# Patient Record
Sex: MALE | Race: ASIAN | NOT HISPANIC OR LATINO | URBAN - METROPOLITAN AREA
[De-identification: names, ages, dates, MRNs, and addresses within clinical notes are randomized per-mention and may not be internally consistent; named-entity substitution may affect disease eponyms.]

---

## 2022-08-19 ENCOUNTER — INPATIENT (INPATIENT)
Facility: HOSPITAL | Age: 67
LOS: 19 days | Discharge: HOME HEALTH | DRG: 329 | End: 2022-09-08
Attending: SURGERY | Admitting: SURGERY
Payer: COMMERCIAL

## 2022-08-19 ENCOUNTER — TRANSCRIPTION ENCOUNTER (OUTPATIENT)
Age: 67
End: 2022-08-19

## 2022-08-19 VITALS
DIASTOLIC BLOOD PRESSURE: 91 MMHG | RESPIRATION RATE: 18 BRPM | WEIGHT: 130.07 LBS | OXYGEN SATURATION: 100 % | HEIGHT: 63 IN | HEART RATE: 50 BPM | SYSTOLIC BLOOD PRESSURE: 159 MMHG

## 2022-08-19 DIAGNOSIS — K63.1 PERFORATION OF INTESTINE (NONTRAUMATIC): ICD-10-CM

## 2022-08-19 LAB
ALBUMIN SERPL ELPH-MCNC: 2.9 G/DL — LOW (ref 3.3–5)
ALBUMIN SERPL ELPH-MCNC: 4 G/DL — SIGNIFICANT CHANGE UP (ref 3.3–5)
ALP SERPL-CCNC: 52 U/L — SIGNIFICANT CHANGE UP (ref 40–120)
ALP SERPL-CCNC: 77 U/L — SIGNIFICANT CHANGE UP (ref 40–120)
ALT FLD-CCNC: 22 U/L — SIGNIFICANT CHANGE UP (ref 10–45)
ALT FLD-CCNC: 23 U/L — SIGNIFICANT CHANGE UP (ref 10–45)
ANION GAP SERPL CALC-SCNC: 12 MMOL/L — SIGNIFICANT CHANGE UP (ref 5–17)
ANION GAP SERPL CALC-SCNC: 15 MMOL/L — SIGNIFICANT CHANGE UP (ref 5–17)
APTT BLD: 25.5 SEC — LOW (ref 27.5–35.5)
AST SERPL-CCNC: 28 U/L — SIGNIFICANT CHANGE UP (ref 10–40)
AST SERPL-CCNC: 29 U/L — SIGNIFICANT CHANGE UP (ref 10–40)
BASE EXCESS BLDV CALC-SCNC: 4.5 MMOL/L — HIGH (ref -2–3)
BASOPHILS # BLD AUTO: 0.01 K/UL — SIGNIFICANT CHANGE UP (ref 0–0.2)
BASOPHILS NFR BLD AUTO: 0.1 % — SIGNIFICANT CHANGE UP (ref 0–2)
BILIRUB SERPL-MCNC: 0.1 MG/DL — LOW (ref 0.2–1.2)
BILIRUB SERPL-MCNC: 0.3 MG/DL — SIGNIFICANT CHANGE UP (ref 0.2–1.2)
BLD GP AB SCN SERPL QL: NEGATIVE — SIGNIFICANT CHANGE UP
BUN SERPL-MCNC: 13 MG/DL — SIGNIFICANT CHANGE UP (ref 7–23)
BUN SERPL-MCNC: 20 MG/DL — SIGNIFICANT CHANGE UP (ref 7–23)
CA-I SERPL-SCNC: 1.17 MMOL/L — SIGNIFICANT CHANGE UP (ref 1.15–1.33)
CALCIUM SERPL-MCNC: 7.1 MG/DL — LOW (ref 8.4–10.5)
CALCIUM SERPL-MCNC: 8.8 MG/DL — SIGNIFICANT CHANGE UP (ref 8.4–10.5)
CHLORIDE BLDV-SCNC: 101 MMOL/L — SIGNIFICANT CHANGE UP (ref 96–108)
CHLORIDE SERPL-SCNC: 103 MMOL/L — SIGNIFICANT CHANGE UP (ref 96–108)
CHLORIDE SERPL-SCNC: 104 MMOL/L — SIGNIFICANT CHANGE UP (ref 96–108)
CO2 BLDV-SCNC: 32 MMOL/L — HIGH (ref 22–26)
CO2 SERPL-SCNC: 23 MMOL/L — SIGNIFICANT CHANGE UP (ref 22–31)
CO2 SERPL-SCNC: 23 MMOL/L — SIGNIFICANT CHANGE UP (ref 22–31)
CREAT SERPL-MCNC: 0.77 MG/DL — SIGNIFICANT CHANGE UP (ref 0.5–1.3)
CREAT SERPL-MCNC: 0.87 MG/DL — SIGNIFICANT CHANGE UP (ref 0.5–1.3)
EGFR: 93 ML/MIN/1.73M2 — SIGNIFICANT CHANGE UP
EGFR: 97 ML/MIN/1.73M2 — SIGNIFICANT CHANGE UP
EOSINOPHIL # BLD AUTO: 0.02 K/UL — SIGNIFICANT CHANGE UP (ref 0–0.5)
EOSINOPHIL NFR BLD AUTO: 0.3 % — SIGNIFICANT CHANGE UP (ref 0–6)
GAS PNL BLDV: 136 MMOL/L — SIGNIFICANT CHANGE UP (ref 136–145)
GAS PNL BLDV: SIGNIFICANT CHANGE UP
GLUCOSE BLDV-MCNC: 164 MG/DL — HIGH (ref 70–99)
GLUCOSE SERPL-MCNC: 132 MG/DL — HIGH (ref 70–99)
GLUCOSE SERPL-MCNC: 152 MG/DL — HIGH (ref 70–99)
HCO3 BLDV-SCNC: 31 MMOL/L — HIGH (ref 22–29)
HCT VFR BLD CALC: 44.5 % — SIGNIFICANT CHANGE UP (ref 39–50)
HCT VFR BLD CALC: 48.5 % — SIGNIFICANT CHANGE UP (ref 39–50)
HCT VFR BLDA CALC: 48 % — SIGNIFICANT CHANGE UP (ref 39–51)
HGB BLD CALC-MCNC: 15.9 G/DL — SIGNIFICANT CHANGE UP (ref 12.6–17.4)
HGB BLD-MCNC: 14.3 G/DL — SIGNIFICANT CHANGE UP (ref 13–17)
HGB BLD-MCNC: 15.5 G/DL — SIGNIFICANT CHANGE UP (ref 13–17)
IMM GRANULOCYTES NFR BLD AUTO: 0.4 % — SIGNIFICANT CHANGE UP (ref 0–1.5)
INR BLD: 1.04 RATIO — SIGNIFICANT CHANGE UP (ref 0.88–1.16)
LACTATE BLDV-MCNC: 3.9 MMOL/L — HIGH (ref 0.5–2)
LIDOCAIN IGE QN: 217 U/L — HIGH (ref 7–60)
LYMPHOCYTES # BLD AUTO: 2.03 K/UL — SIGNIFICANT CHANGE UP (ref 1–3.3)
LYMPHOCYTES # BLD AUTO: 27.6 % — SIGNIFICANT CHANGE UP (ref 13–44)
MAGNESIUM SERPL-MCNC: 1.8 MG/DL — SIGNIFICANT CHANGE UP (ref 1.6–2.6)
MCHC RBC-ENTMCNC: 29.9 PG — SIGNIFICANT CHANGE UP (ref 27–34)
MCHC RBC-ENTMCNC: 30 PG — SIGNIFICANT CHANGE UP (ref 27–34)
MCHC RBC-ENTMCNC: 32 GM/DL — SIGNIFICANT CHANGE UP (ref 32–36)
MCHC RBC-ENTMCNC: 32.1 GM/DL — SIGNIFICANT CHANGE UP (ref 32–36)
MCV RBC AUTO: 93.5 FL — SIGNIFICANT CHANGE UP (ref 80–100)
MCV RBC AUTO: 93.6 FL — SIGNIFICANT CHANGE UP (ref 80–100)
MONOCYTES # BLD AUTO: 0.3 K/UL — SIGNIFICANT CHANGE UP (ref 0–0.9)
MONOCYTES NFR BLD AUTO: 4.1 % — SIGNIFICANT CHANGE UP (ref 2–14)
NEUTROPHILS # BLD AUTO: 4.96 K/UL — SIGNIFICANT CHANGE UP (ref 1.8–7.4)
NEUTROPHILS NFR BLD AUTO: 67.5 % — SIGNIFICANT CHANGE UP (ref 43–77)
NRBC # BLD: 0 /100 WBCS — SIGNIFICANT CHANGE UP (ref 0–0)
NRBC # BLD: 0 /100 WBCS — SIGNIFICANT CHANGE UP (ref 0–0)
NT-PROBNP SERPL-SCNC: 45 PG/ML — SIGNIFICANT CHANGE UP (ref 0–300)
PCO2 BLDV: 51 MMHG — SIGNIFICANT CHANGE UP (ref 42–55)
PH BLDV: 7.39 — SIGNIFICANT CHANGE UP (ref 7.32–7.43)
PHOSPHATE SERPL-MCNC: 3.8 MG/DL — SIGNIFICANT CHANGE UP (ref 2.5–4.5)
PLATELET # BLD AUTO: 170 K/UL — SIGNIFICANT CHANGE UP (ref 150–400)
PLATELET # BLD AUTO: 197 K/UL — SIGNIFICANT CHANGE UP (ref 150–400)
PO2 BLDV: 38 MMHG — SIGNIFICANT CHANGE UP (ref 25–45)
POTASSIUM BLDV-SCNC: 4 MMOL/L — SIGNIFICANT CHANGE UP (ref 3.5–5.1)
POTASSIUM SERPL-MCNC: 3.1 MMOL/L — LOW (ref 3.5–5.3)
POTASSIUM SERPL-MCNC: 4 MMOL/L — SIGNIFICANT CHANGE UP (ref 3.5–5.3)
POTASSIUM SERPL-SCNC: 3.1 MMOL/L — LOW (ref 3.5–5.3)
POTASSIUM SERPL-SCNC: 4 MMOL/L — SIGNIFICANT CHANGE UP (ref 3.5–5.3)
PROT SERPL-MCNC: 5 G/DL — LOW (ref 6–8.3)
PROT SERPL-MCNC: 6.8 G/DL — SIGNIFICANT CHANGE UP (ref 6–8.3)
PROTHROM AB SERPL-ACNC: 12.1 SEC — SIGNIFICANT CHANGE UP (ref 10.5–13.4)
RBC # BLD: 4.76 M/UL — SIGNIFICANT CHANGE UP (ref 4.2–5.8)
RBC # BLD: 5.18 M/UL — SIGNIFICANT CHANGE UP (ref 4.2–5.8)
RBC # FLD: 12.3 % — SIGNIFICANT CHANGE UP (ref 10.3–14.5)
RBC # FLD: 12.3 % — SIGNIFICANT CHANGE UP (ref 10.3–14.5)
RH IG SCN BLD-IMP: POSITIVE — SIGNIFICANT CHANGE UP
SAO2 % BLDV: 60.5 % — LOW (ref 67–88)
SARS-COV-2 RNA SPEC QL NAA+PROBE: DETECTED
SARS-COV-2 RNA SPEC QL NAA+PROBE: DETECTED
SODIUM SERPL-SCNC: 139 MMOL/L — SIGNIFICANT CHANGE UP (ref 135–145)
SODIUM SERPL-SCNC: 141 MMOL/L — SIGNIFICANT CHANGE UP (ref 135–145)
TROPONIN T, HIGH SENSITIVITY RESULT: <6 NG/L — SIGNIFICANT CHANGE UP (ref 0–51)
WBC # BLD: 3.23 K/UL — LOW (ref 3.8–10.5)
WBC # BLD: 7.35 K/UL — SIGNIFICANT CHANGE UP (ref 3.8–10.5)
WBC # FLD AUTO: 3.23 K/UL — LOW (ref 3.8–10.5)
WBC # FLD AUTO: 7.35 K/UL — SIGNIFICANT CHANGE UP (ref 3.8–10.5)

## 2022-08-19 PROCEDURE — 74174 CTA ABD&PLVS W/CONTRAST: CPT | Mod: 26,MA

## 2022-08-19 PROCEDURE — 93010 ELECTROCARDIOGRAM REPORT: CPT

## 2022-08-19 PROCEDURE — 71275 CT ANGIOGRAPHY CHEST: CPT | Mod: 26,MA

## 2022-08-19 PROCEDURE — 99285 EMERGENCY DEPT VISIT HI MDM: CPT

## 2022-08-19 PROCEDURE — 74018 RADEX ABDOMEN 1 VIEW: CPT | Mod: 26

## 2022-08-19 PROCEDURE — 44602 SUTURE SMALL INTESTINE: CPT

## 2022-08-19 RX ORDER — CHLORHEXIDINE GLUCONATE 213 G/1000ML
1 SOLUTION TOPICAL DAILY
Refills: 0 | Status: DISCONTINUED | OUTPATIENT
Start: 2022-08-19 | End: 2022-08-28

## 2022-08-19 RX ORDER — NALOXONE HYDROCHLORIDE 4 MG/.1ML
0.1 SPRAY NASAL
Refills: 0 | Status: DISCONTINUED | OUTPATIENT
Start: 2022-08-19 | End: 2022-08-28

## 2022-08-19 RX ORDER — HYDROMORPHONE HYDROCHLORIDE 2 MG/ML
0.5 INJECTION INTRAMUSCULAR; INTRAVENOUS; SUBCUTANEOUS
Refills: 0 | Status: DISCONTINUED | OUTPATIENT
Start: 2022-08-19 | End: 2022-08-26

## 2022-08-19 RX ORDER — SODIUM CHLORIDE 9 MG/ML
1000 INJECTION, SOLUTION INTRAVENOUS
Refills: 0 | Status: DISCONTINUED | OUTPATIENT
Start: 2022-08-19 | End: 2022-08-19

## 2022-08-19 RX ORDER — ACETAMINOPHEN 500 MG
1000 TABLET ORAL EVERY 6 HOURS
Refills: 0 | Status: COMPLETED | OUTPATIENT
Start: 2022-08-19 | End: 2022-08-20

## 2022-08-19 RX ORDER — PANTOPRAZOLE SODIUM 20 MG/1
80 TABLET, DELAYED RELEASE ORAL
Refills: 0 | Status: DISCONTINUED | OUTPATIENT
Start: 2022-08-19 | End: 2022-08-23

## 2022-08-19 RX ORDER — ONDANSETRON 8 MG/1
4 TABLET, FILM COATED ORAL ONCE
Refills: 0 | Status: DISCONTINUED | OUTPATIENT
Start: 2022-08-19 | End: 2022-08-19

## 2022-08-19 RX ORDER — HYDROMORPHONE HYDROCHLORIDE 2 MG/ML
0.5 INJECTION INTRAMUSCULAR; INTRAVENOUS; SUBCUTANEOUS
Refills: 0 | Status: DISCONTINUED | OUTPATIENT
Start: 2022-08-19 | End: 2022-08-19

## 2022-08-19 RX ORDER — FENTANYL CITRATE 50 UG/ML
50 INJECTION INTRAVENOUS ONCE
Refills: 0 | Status: DISCONTINUED | OUTPATIENT
Start: 2022-08-19 | End: 2022-08-19

## 2022-08-19 RX ORDER — PANTOPRAZOLE SODIUM 20 MG/1
8 TABLET, DELAYED RELEASE ORAL
Qty: 80 | Refills: 0 | Status: DISCONTINUED | OUTPATIENT
Start: 2022-08-19 | End: 2022-08-19

## 2022-08-19 RX ORDER — ENOXAPARIN SODIUM 100 MG/ML
40 INJECTION SUBCUTANEOUS EVERY 24 HOURS
Refills: 0 | Status: DISCONTINUED | OUTPATIENT
Start: 2022-08-19 | End: 2022-08-29

## 2022-08-19 RX ORDER — PIPERACILLIN AND TAZOBACTAM 4; .5 G/20ML; G/20ML
3.38 INJECTION, POWDER, LYOPHILIZED, FOR SOLUTION INTRAVENOUS ONCE
Refills: 0 | Status: COMPLETED | OUTPATIENT
Start: 2022-08-19 | End: 2022-08-19

## 2022-08-19 RX ORDER — PIPERACILLIN AND TAZOBACTAM 4; .5 G/20ML; G/20ML
3.38 INJECTION, POWDER, LYOPHILIZED, FOR SOLUTION INTRAVENOUS
Refills: 0 | Status: DISCONTINUED | OUTPATIENT
Start: 2022-08-19 | End: 2022-08-24

## 2022-08-19 RX ORDER — SODIUM CHLORIDE 9 MG/ML
1000 INJECTION, SOLUTION INTRAVENOUS
Refills: 0 | Status: DISCONTINUED | OUTPATIENT
Start: 2022-08-19 | End: 2022-08-20

## 2022-08-19 RX ORDER — HYDROMORPHONE HYDROCHLORIDE 2 MG/ML
30 INJECTION INTRAMUSCULAR; INTRAVENOUS; SUBCUTANEOUS
Refills: 0 | Status: DISCONTINUED | OUTPATIENT
Start: 2022-08-19 | End: 2022-08-26

## 2022-08-19 RX ORDER — PANTOPRAZOLE SODIUM 20 MG/1
40 TABLET, DELAYED RELEASE ORAL
Refills: 0 | Status: DISCONTINUED | OUTPATIENT
Start: 2022-08-19 | End: 2022-08-19

## 2022-08-19 RX ORDER — ONDANSETRON 8 MG/1
4 TABLET, FILM COATED ORAL ONCE
Refills: 0 | Status: COMPLETED | OUTPATIENT
Start: 2022-08-19 | End: 2022-08-19

## 2022-08-19 RX ORDER — ONDANSETRON 8 MG/1
4 TABLET, FILM COATED ORAL EVERY 6 HOURS
Refills: 0 | Status: DISCONTINUED | OUTPATIENT
Start: 2022-08-19 | End: 2022-08-28

## 2022-08-19 RX ORDER — ACETAMINOPHEN 500 MG
1000 TABLET ORAL ONCE
Refills: 0 | Status: DISCONTINUED | OUTPATIENT
Start: 2022-08-19 | End: 2022-08-19

## 2022-08-19 RX ORDER — PANTOPRAZOLE SODIUM 20 MG/1
40 TABLET, DELAYED RELEASE ORAL DAILY
Refills: 0 | Status: DISCONTINUED | OUTPATIENT
Start: 2022-08-19 | End: 2022-08-19

## 2022-08-19 RX ORDER — PIPERACILLIN AND TAZOBACTAM 4; .5 G/20ML; G/20ML
3.38 INJECTION, POWDER, LYOPHILIZED, FOR SOLUTION INTRAVENOUS EVERY 8 HOURS
Refills: 0 | Status: DISCONTINUED | OUTPATIENT
Start: 2022-08-19 | End: 2022-08-19

## 2022-08-19 RX ORDER — SODIUM CHLORIDE 9 MG/ML
1000 INJECTION, SOLUTION INTRAVENOUS ONCE
Refills: 0 | Status: COMPLETED | OUTPATIENT
Start: 2022-08-19 | End: 2022-08-19

## 2022-08-19 RX ORDER — PANTOPRAZOLE SODIUM 20 MG/1
40 TABLET, DELAYED RELEASE ORAL ONCE
Refills: 0 | Status: COMPLETED | OUTPATIENT
Start: 2022-08-19 | End: 2022-08-19

## 2022-08-19 RX ORDER — POTASSIUM CHLORIDE 20 MEQ
10 PACKET (EA) ORAL
Refills: 0 | Status: DISCONTINUED | OUTPATIENT
Start: 2022-08-19 | End: 2022-08-19

## 2022-08-19 RX ADMIN — ENOXAPARIN SODIUM 40 MILLIGRAM(S): 100 INJECTION SUBCUTANEOUS at 22:25

## 2022-08-19 RX ADMIN — SODIUM CHLORIDE 1000 MILLILITER(S): 9 INJECTION, SOLUTION INTRAVENOUS at 08:34

## 2022-08-19 RX ADMIN — HYDROMORPHONE HYDROCHLORIDE 30 MILLILITER(S): 2 INJECTION INTRAMUSCULAR; INTRAVENOUS; SUBCUTANEOUS at 17:12

## 2022-08-19 RX ADMIN — PIPERACILLIN AND TAZOBACTAM 25 GRAM(S): 4; .5 INJECTION, POWDER, LYOPHILIZED, FOR SOLUTION INTRAVENOUS at 16:30

## 2022-08-19 RX ADMIN — SODIUM CHLORIDE 125 MILLILITER(S): 9 INJECTION, SOLUTION INTRAVENOUS at 09:32

## 2022-08-19 RX ADMIN — FENTANYL CITRATE 50 MICROGRAM(S): 50 INJECTION INTRAVENOUS at 08:57

## 2022-08-19 RX ADMIN — Medication 100 MILLIEQUIVALENT(S): at 10:22

## 2022-08-19 RX ADMIN — FENTANYL CITRATE 50 MICROGRAM(S): 50 INJECTION INTRAVENOUS at 10:30

## 2022-08-19 RX ADMIN — Medication 400 MILLIGRAM(S): at 22:24

## 2022-08-19 RX ADMIN — HYDROMORPHONE HYDROCHLORIDE 30 MILLILITER(S): 2 INJECTION INTRAMUSCULAR; INTRAVENOUS; SUBCUTANEOUS at 19:34

## 2022-08-19 RX ADMIN — PANTOPRAZOLE SODIUM 40 MILLIGRAM(S): 20 TABLET, DELAYED RELEASE ORAL at 09:32

## 2022-08-19 RX ADMIN — PIPERACILLIN AND TAZOBACTAM 25 GRAM(S): 4; .5 INJECTION, POWDER, LYOPHILIZED, FOR SOLUTION INTRAVENOUS at 22:24

## 2022-08-19 RX ADMIN — Medication 1000 MILLIGRAM(S): at 23:24

## 2022-08-19 RX ADMIN — PANTOPRAZOLE SODIUM 10 MG/HR: 20 TABLET, DELAYED RELEASE ORAL at 17:37

## 2022-08-19 RX ADMIN — PIPERACILLIN AND TAZOBACTAM 200 GRAM(S): 4; .5 INJECTION, POWDER, LYOPHILIZED, FOR SOLUTION INTRAVENOUS at 08:57

## 2022-08-19 RX ADMIN — ONDANSETRON 4 MILLIGRAM(S): 8 TABLET, FILM COATED ORAL at 08:34

## 2022-08-19 RX ADMIN — FENTANYL CITRATE 50 MICROGRAM(S): 50 INJECTION INTRAVENOUS at 08:34

## 2022-08-19 NOTE — ED PROVIDER NOTE - PHYSICAL EXAMINATION
GENERAL: +acute distress, non-toxic appearing  HEAD: normocephalic, atraumatic  HEENT: normal conjunctiva, oral mucosa moist, neck supple  CARDIAC: regular rate and rhythm, normal S1 and S2,  no appreciable murmurs  PULM: clear to ascultation bilaterally, no crackles, rales, rhonchi, or wheezing  GI: +rigid, +abdomen distended  NEURO: alert and oriented x 3, normal speech, moving all extremities   MSK: no visible deformities, no peripheral edema, calf tenderness/redness/swelling  SKIN: no visible rashes, dry, well-perfused  PSYCH: appropriate mood and affect

## 2022-08-19 NOTE — BRIEF OPERATIVE NOTE - OPERATION/FINDINGS
exploratory laparotomy for pneumoperitoneum, duodenal perforation found. repaired using falciform ligament as a patch.  fascia closed with #0 loop PDS, and skin closed with staples. DINAH drain left in the RUQ anterior to the repair.

## 2022-08-19 NOTE — ED PROVIDER NOTE - OBJECTIVE STATEMENT
68 yo M PMHx of stomach ulcer presents with severe left sided chest pain that began this morning radiating up to the neck. Patient currently screaming in pain, history given by wife. Endorsed nausea this morning, no vomiting, no changes in BMs, no fevers, chills. Prior hx of gastric ulcer (unknown if 68 yo M PMHx of stomach ulcer presents with severe left sided chest pain that began this morning radiating up to the neck. Patient currently screaming in pain, history given by wife. Endorsed nausea this morning, no vomiting, no changes in BMs, no fevers, chills. Prior hx of gastric ulcer.

## 2022-08-19 NOTE — ED PROVIDER NOTE - CLINICAL SUMMARY MEDICAL DECISION MAKING FREE TEXT BOX
66 yo M presents with sudden onset chest pain radiating to neck with rigid abdomen, bradycardia, in acute distress. Concern for dissection vs perforation vs MI. Will get CTA, labs.

## 2022-08-19 NOTE — ED PROVIDER NOTE - ATTENDING CONTRIBUTION TO CARE
Attending MD Contreras:  I personally have seen and examined this patient. I have performed a substantive portion of the visit including all aspects of the medical decision making.  Resident note reviewed and agree on plan of care and except where noted.        67M presenting with severe upper abdominal pain radiating to left chest and up to ears, patient arrives in extremis with EMS, moaning and writhing in severe pain, HR 50s (sinus), BP 100s, abd exam with moderate distention, diffuse ttp with rebound concerning for peritonitis. Consider also aortic dissection given movement to chest and ears, will proceed directly to CTA to r/o dissection, rule out pneumoperitoneum. Pt provided IV fentanyl and zofran             *The above represents an initial assessment/impression. Please refer to progress notes for potential changes in patient clinical course*

## 2022-08-19 NOTE — ED ADULT NURSE NOTE - OBJECTIVE STATEMENT
68yo M aaox3 h/o constipations, presents to Ed from home via EMS c/o abdominal pain , pt Cantonese speaking (  [phone] Mickey) , as per EMS they got called for acid reflux and abdominal pain, but pt on the way to clinical area (ED/ gold) onset a severe neck/ L upper chest  pain. MD Contreras at the bedside fentanyl medicated x2, placed IV, took at 0834 to CT Scan w/ MD/RN/tech on cardiac monitor , PT is on severe pain. Wife at the bedside helping with the history. Safety and comfort measures initiated- bed placed in lowest position and side rails raised. 66yo M aaox3 h/o constipations, presents to Ed from home via EMS c/o abdominal pain , pt Cantonese speaking (  [phone] Mickey) , as per EMS they got called for acid reflux and abdominal pain, but pt on the way to clinical area (ED/ gold) onset a severe neck/ L upper chest  pain. MD Contreras at the bedside fentanyl medicated x2, placed IV, took at 0834 to CT Scan w/ MD/RN/tech on cardiac monitor , PT is on severe pain. Wife at the bedside helping with the history., as per wife pt is taking (5 days) ABx for "dental infection",  Safety and comfort measures initiated- bed placed in lowest position and side rails raised.

## 2022-08-19 NOTE — ED PROVIDER NOTE - NS ED ROS FT
GENERAL: no fever, no chills, no weight loss  EYES: no change in vision, no irritation, no discharge, no redness, no pain  HEENT: no trouble swallowing or speaking, no throat pain, no ear pain  CARDIAC: no chest pain, no palpitations   PULMONARY: no cough, no shortness of breath, no wheezing  GI: +abdominal pain, + nausea, no vomiting, no diarrhea, no constipation, no melena, no hematochezia, no hematemesis  : no changes in urination, no dysuria, no frequency, no hematuria, no discharge  SKIN: no rashes  NEURO: no headache, no numbness, no weakness  MSK: no joint pain, no muscle pain, no back pain, no calf pain

## 2022-08-19 NOTE — ED ADULT NURSE NOTE - CHPI ED NUR DURATION
today Modified Advancement Flap Text: The defect edges were debeveled with a #15 scalpel blade.  Given the location of the defect, shape of the defect and the proximity to free margins a modified advancement flap was deemed most appropriate.  Using a sterile surgical marker, an appropriate advancement flap was drawn incorporating the defect and placing the expected incisions within the relaxed skin tension lines where possible.    The area thus outlined was incised deep to adipose tissue with a #15 scalpel blade.  The skin margins were undermined to an appropriate distance in all directions utilizing iris scissors.

## 2022-08-19 NOTE — H&P ADULT - HISTORY OF PRESENT ILLNESS
HPI:        PAST MEDICAL & SURGICAL HISTORY:  No pertinent past medical history      No significant past surgical history          MEDICATIONS  (STANDING):  lactated ringers. 1000 milliLiter(s) (125 mL/Hr) IV Continuous <Continuous>  piperacillin/tazobactam IVPB.. 3.375 Gram(s) IV Intermittent every 8 hours    MEDICATIONS  (PRN):      Allergies    No Known Allergies    Intolerances        SOCIAL HISTORY:    FAMILY HISTORY:          Physical Exam:  General: NAD, resting comfortably  HEENT: NC/AT, EOMI, normal hearing, no oral lesions, no LAD, neck supple  Pulmonary: normal resp effort, CTA-B  Cardiovascular: NSR, no murmurs  Abdominal: soft, ND/NT, no organomegaly  Extremities: WWP, normal strength, no clubbing/cyanosis/edema  Neuro: A/O x 3, CNs II-XII grossly intact, normal sensation, no focal deficits  Pulses: palpable distal pulses    Vital Signs Last 24 Hrs  T(C): 36.4 (19 Aug 2022 08:20), Max: 36.4 (19 Aug 2022 08:20)  T(F): 97.6 (19 Aug 2022 08:20), Max: 97.6 (19 Aug 2022 08:20)  HR: 58 (19 Aug 2022 09:15) (50 - 66)  BP: 151/77 (19 Aug 2022 09:15) (116/70 - 171/84)  BP(mean): --  RR: 18 (19 Aug 2022 09:15) (18 - 20)  SpO2: 100% (19 Aug 2022 09:15) (97% - 100%)    Parameters below as of 19 Aug 2022 08:45  Patient On (Oxygen Delivery Method): room air        I&O's Summary          LABS:                        15.5   7.35  )-----------( 197      ( 19 Aug 2022 08:53 )             48.5     08-19    141  |  103  |  20  ----------------------------<  152<H>  3.1<L>   |  23  |  0.87    Ca    8.8      19 Aug 2022 08:53    TPro  6.8  /  Alb  4.0  /  TBili  0.1<L>  /  DBili  x   /  AST  28  /  ALT  22  /  AlkPhos  77  08-19    PT/INR - ( 19 Aug 2022 08:53 )   PT: 12.1 sec;   INR: 1.04 ratio         PTT - ( 19 Aug 2022 08:53 )  PTT:25.5 sec    CAPILLARY BLOOD GLUCOSE        LIVER FUNCTIONS - ( 19 Aug 2022 08:53 )  Alb: 4.0 g/dL / Pro: 6.8 g/dL / ALK PHOS: 77 U/L / ALT: 22 U/L / AST: 28 U/L / GGT: x             Cultures:      RADIOLOGY & ADDITIONAL STUDIES:  < from: CT Angio Abdomen and Pelvis w/ IV Cont (08.19.22 @ 08:49) >  IMPRESSION:  There is pylorus/proximal duodenal wall thickening and small intramural   bubbles of gas associated with moderate pneumoperitoneum/small ascites   likely representing viscus rupture.    Thickening of the loops of the jejunum possibly reactive enteritis.    Nonspecific 1.7 cm subpleural opacity within the right apex. Borderline   right hilar lymph node measuring 1.2 cm in short axis. Recommend   follow-up chest CT in 3 months to determine stability.    No dissection or pulmonary embolus.    < end of copied text >           HPI:  67M remote history of gastric ulcer originally on omeprazole but since discontinued, recent tooth infection on amoxicillin, taking 600mg ibuprofen tid for pain, presenting with severe abdominal pain this morning. Pain has since radiated up to neck and ear. Patient denies nausea, vomiting, changes in bowel function. In ED, patient is hemodynamically stable, with no leukocytosis. CT scan notable for pneumoperitoneum, pyloric and proximal duodenal wall thickening with intramural gas likely representing viscus rupture.         PAST MEDICAL & SURGICAL HISTORY:  No pertinent past medical history      No significant past surgical history          MEDICATIONS  (STANDING):  lactated ringers. 1000 milliLiter(s) (125 mL/Hr) IV Continuous <Continuous>  piperacillin/tazobactam IVPB.. 3.375 Gram(s) IV Intermittent every 8 hours    MEDICATIONS  (PRN):      Allergies    No Known Allergies    Intolerances        Physical Exam:  General: lying in bed, grimacing  HEENT: NC/AT, EOMI, normal hearing  Pulmonary: normal resp effort, CTA-B  Cardiovascular: normal sinus bradycardia, warm and well perfused   Abdominal: firm abdomen, diffusely tender to palpation in all four quadrants   Extremities: WWP, normal strength  Neuro: A/O x 3, CNs II-XII grossly intact, normal sensation, no focal deficits    Vital Signs Last 24 Hrs  T(C): 36.4 (19 Aug 2022 08:20), Max: 36.4 (19 Aug 2022 08:20)  T(F): 97.6 (19 Aug 2022 08:20), Max: 97.6 (19 Aug 2022 08:20)  HR: 58 (19 Aug 2022 09:15) (50 - 66)  BP: 151/77 (19 Aug 2022 09:15) (116/70 - 171/84)  BP(mean): --  RR: 18 (19 Aug 2022 09:15) (18 - 20)  SpO2: 100% (19 Aug 2022 09:15) (97% - 100%)    Parameters below as of 19 Aug 2022 08:45  Patient On (Oxygen Delivery Method): room air        I&O's Summary          LABS:                        15.5   7.35  )-----------( 197      ( 19 Aug 2022 08:53 )             48.5     08-19    141  |  103  |  20  ----------------------------<  152<H>  3.1<L>   |  23  |  0.87    Ca    8.8      19 Aug 2022 08:53    TPro  6.8  /  Alb  4.0  /  TBili  0.1<L>  /  DBili  x   /  AST  28  /  ALT  22  /  AlkPhos  77  08-19    PT/INR - ( 19 Aug 2022 08:53 )   PT: 12.1 sec;   INR: 1.04 ratio         PTT - ( 19 Aug 2022 08:53 )  PTT:25.5 sec    CAPILLARY BLOOD GLUCOSE        LIVER FUNCTIONS - ( 19 Aug 2022 08:53 )  Alb: 4.0 g/dL / Pro: 6.8 g/dL / ALK PHOS: 77 U/L / ALT: 22 U/L / AST: 28 U/L / GGT: x             Cultures:      RADIOLOGY & ADDITIONAL STUDIES:  < from: CT Angio Abdomen and Pelvis w/ IV Cont (08.19.22 @ 08:49) >  IMPRESSION:  There is pylorus/proximal duodenal wall thickening and small intramural   bubbles of gas associated with moderate pneumoperitoneum/small ascites   likely representing viscus rupture.    Thickening of the loops of the jejunum possibly reactive enteritis.    Nonspecific 1.7 cm subpleural opacity within the right apex. Borderline   right hilar lymph node measuring 1.2 cm in short axis. Recommend   follow-up chest CT in 3 months to determine stability.    No dissection or pulmonary embolus.    < end of copied text >

## 2022-08-19 NOTE — CHART NOTE - NSCHARTNOTEFT_GEN_A_CORE
Patient: DELBERT NORRIS 67y (1955) Male   MRN: [id number]  Location: [facility] 2MON 213 W1  Visit: 08-19-22 Inpatient      Procedure: S/P exploratory laparotomy for pneumoperitoneum 2/2 duodenal perforation    Subjective: Cantonese speaking patient resting comfortably in bed, NAD. Wife at bedside and family on phone. +appropriate tenderness around incision sites. Denies fever, chills, CP, SOB, n/v. -/- BM, +clear urine in tsang bag.     Objective:  Vitals: T(F): 98.1 (08-19-22 @ 21:30), Max: 98.3 (08-19-22 @ 19:40)  HR: 69 (08-19-22 @ 21:30)  BP: 102/61 (08-19-22 @ 21:30) (93/59 - 171/84)  RR: 17 (08-19-22 @ 21:30)  SpO2: 99% (08-19-22 @ 21:30)  Vent Settings:     In:   08-19-22 @ 07:01  -  08-19-22 @ 22:14  --------------------------------------------------------  IN: 450 mL      IV Fluids: lactated ringers. 1000 milliLiter(s) (125 mL/Hr) IV Continuous <Continuous>      Out:   08-19-22 @ 07:01  -  08-19-22 @ 22:14  --------------------------------------------------------  OUT: 720 mL      EBL:     Voided Urine:   08-19-22 @ 07:01  -  08-19-22 @ 22:14  --------------------------------------------------------  OUT: 720 mL      Tsang Catheter: yes no   Drains:   DINAH:   08-19-22 @ 07:01  -  08-19-22 @ 22:14  --------------------------------------------------------  OUT: 330 mL             Physical Examination:  General: NAD, resting comfortably in bed  HEENT: Normocephalic atraumatic  Respiratory: Nonlabored respirations, normal CW expansion.  Cardio: S1S2, regular rate and rhythm.  Abdomen: soft, non-distended appropriately tender, surgical incisions are c/d/i. NGT on wall suction  Vascular: extremities are warm and well perfused.     Imaging:  No post-op imaging studies    Assessment:  67yMale patient S/P exploratory laparotomy for pneumoperitoneum 2/2 duodenal perforation    Plan:  - Pain control PRN  - Diet: NPO  - Activity: PT/OP eval pending  - DVT ppx    ACS/Trauma Surgery  [id number]

## 2022-08-19 NOTE — H&P ADULT - ASSESSMENT
Discussed with attendings Dr. Ernst Espinoza and Dr. Sade Sy, PGY-2  [facility]  [id number]  67M, remote history of gastric ulcer formerly on omeprazole, sporadic ibuprofen use, presenting with abdominal pain, found to have pneumoperitoneum on CT scan c/f viscus rupture.    PLAN:  - Admit to ACS under Dr. Ernst Espinoza  - Booked for emergent exploratory laparotomy   - Consent signed and placed in chart  - S/p 1x zosyn in ED, cont. IV abx  - On amoxicillin at home for tooth ache, will cover with zosyn   - NPO, IVF  - F/u H. pylori testing       Discussed with attendings Dr. Ernst Espinoza and Dr. Sade Sy, PGY-2  ACS  [id number]

## 2022-08-19 NOTE — BRIEF OPERATIVE NOTE - NSICDXBRIEFPROCEDURE_GEN_ALL_CORE_FT
PROCEDURES:  Exploratory laparotomy 19-Aug-2022 16:26:44  Aramis Esteves  Closure of perforated duodenum using omental patch 19-Aug-2022 16:28:12  Aramis Esteves

## 2022-08-20 LAB
ALBUMIN SERPL ELPH-MCNC: 2.5 G/DL — LOW (ref 3.3–5)
ALP SERPL-CCNC: 41 U/L — SIGNIFICANT CHANGE UP (ref 40–120)
ALT FLD-CCNC: 21 U/L — SIGNIFICANT CHANGE UP (ref 10–45)
ANION GAP SERPL CALC-SCNC: 11 MMOL/L — SIGNIFICANT CHANGE UP (ref 5–17)
AST SERPL-CCNC: 26 U/L — SIGNIFICANT CHANGE UP (ref 10–40)
BILIRUB SERPL-MCNC: 0.6 MG/DL — SIGNIFICANT CHANGE UP (ref 0.2–1.2)
BUN SERPL-MCNC: 13 MG/DL — SIGNIFICANT CHANGE UP (ref 7–23)
CALCIUM SERPL-MCNC: 6.9 MG/DL — LOW (ref 8.4–10.5)
CHLORIDE SERPL-SCNC: 103 MMOL/L — SIGNIFICANT CHANGE UP (ref 96–108)
CO2 SERPL-SCNC: 23 MMOL/L — SIGNIFICANT CHANGE UP (ref 22–31)
CREAT SERPL-MCNC: 0.92 MG/DL — SIGNIFICANT CHANGE UP (ref 0.5–1.3)
EGFR: 90 ML/MIN/1.73M2 — SIGNIFICANT CHANGE UP
GLUCOSE SERPL-MCNC: 107 MG/DL — HIGH (ref 70–99)
HCT VFR BLD CALC: 45.2 % — SIGNIFICANT CHANGE UP (ref 39–50)
HCV AB S/CO SERPL IA: 0.13 S/CO — SIGNIFICANT CHANGE UP (ref 0–0.99)
HCV AB SERPL-IMP: SIGNIFICANT CHANGE UP
HGB BLD-MCNC: 14.5 G/DL — SIGNIFICANT CHANGE UP (ref 13–17)
MAGNESIUM SERPL-MCNC: 1.8 MG/DL — SIGNIFICANT CHANGE UP (ref 1.6–2.6)
MCHC RBC-ENTMCNC: 29.9 PG — SIGNIFICANT CHANGE UP (ref 27–34)
MCHC RBC-ENTMCNC: 32.1 GM/DL — SIGNIFICANT CHANGE UP (ref 32–36)
MCV RBC AUTO: 93.2 FL — SIGNIFICANT CHANGE UP (ref 80–100)
NRBC # BLD: 0 /100 WBCS — SIGNIFICANT CHANGE UP (ref 0–0)
PHOSPHATE SERPL-MCNC: 3.7 MG/DL — SIGNIFICANT CHANGE UP (ref 2.5–4.5)
PLATELET # BLD AUTO: 164 K/UL — SIGNIFICANT CHANGE UP (ref 150–400)
POTASSIUM SERPL-MCNC: 4 MMOL/L — SIGNIFICANT CHANGE UP (ref 3.5–5.3)
POTASSIUM SERPL-SCNC: 4 MMOL/L — SIGNIFICANT CHANGE UP (ref 3.5–5.3)
PROT SERPL-MCNC: 4.9 G/DL — LOW (ref 6–8.3)
RBC # BLD: 4.85 M/UL — SIGNIFICANT CHANGE UP (ref 4.2–5.8)
RBC # FLD: 12.5 % — SIGNIFICANT CHANGE UP (ref 10.3–14.5)
SODIUM SERPL-SCNC: 137 MMOL/L — SIGNIFICANT CHANGE UP (ref 135–145)
WBC # BLD: 5.22 K/UL — SIGNIFICANT CHANGE UP (ref 3.8–10.5)
WBC # FLD AUTO: 5.22 K/UL — SIGNIFICANT CHANGE UP (ref 3.8–10.5)

## 2022-08-20 RX ORDER — DEXTROSE MONOHYDRATE, SODIUM CHLORIDE, AND POTASSIUM CHLORIDE 50; .745; 4.5 G/1000ML; G/1000ML; G/1000ML
1000 INJECTION, SOLUTION INTRAVENOUS
Refills: 0 | Status: DISCONTINUED | OUTPATIENT
Start: 2022-08-20 | End: 2022-08-25

## 2022-08-20 RX ORDER — TETRACAINE/BENZOCAINE/BUTAMBEN 2%-14%-2%
1 OINTMENT (GRAM) TOPICAL EVERY 6 HOURS
Refills: 0 | Status: DISCONTINUED | OUTPATIENT
Start: 2022-08-20 | End: 2022-08-28

## 2022-08-20 RX ADMIN — CHLORHEXIDINE GLUCONATE 1 APPLICATION(S): 213 SOLUTION TOPICAL at 19:17

## 2022-08-20 RX ADMIN — Medication 1000 MILLIGRAM(S): at 05:30

## 2022-08-20 RX ADMIN — Medication 1000 MILLIGRAM(S): at 15:00

## 2022-08-20 RX ADMIN — ENOXAPARIN SODIUM 40 MILLIGRAM(S): 100 INJECTION SUBCUTANEOUS at 18:14

## 2022-08-20 RX ADMIN — PANTOPRAZOLE SODIUM 80 MILLIGRAM(S): 20 TABLET, DELAYED RELEASE ORAL at 18:14

## 2022-08-20 RX ADMIN — PIPERACILLIN AND TAZOBACTAM 25 GRAM(S): 4; .5 INJECTION, POWDER, LYOPHILIZED, FOR SOLUTION INTRAVENOUS at 06:07

## 2022-08-20 RX ADMIN — HYDROMORPHONE HYDROCHLORIDE 30 MILLILITER(S): 2 INJECTION INTRAMUSCULAR; INTRAVENOUS; SUBCUTANEOUS at 07:19

## 2022-08-20 RX ADMIN — PIPERACILLIN AND TAZOBACTAM 25 GRAM(S): 4; .5 INJECTION, POWDER, LYOPHILIZED, FOR SOLUTION INTRAVENOUS at 14:23

## 2022-08-20 RX ADMIN — HYDROMORPHONE HYDROCHLORIDE 30 MILLILITER(S): 2 INJECTION INTRAMUSCULAR; INTRAVENOUS; SUBCUTANEOUS at 19:14

## 2022-08-20 RX ADMIN — Medication 400 MILLIGRAM(S): at 04:36

## 2022-08-20 RX ADMIN — DEXTROSE MONOHYDRATE, SODIUM CHLORIDE, AND POTASSIUM CHLORIDE 125 MILLILITER(S): 50; .745; 4.5 INJECTION, SOLUTION INTRAVENOUS at 22:07

## 2022-08-20 RX ADMIN — PIPERACILLIN AND TAZOBACTAM 25 GRAM(S): 4; .5 INJECTION, POWDER, LYOPHILIZED, FOR SOLUTION INTRAVENOUS at 22:07

## 2022-08-20 RX ADMIN — PANTOPRAZOLE SODIUM 80 MILLIGRAM(S): 20 TABLET, DELAYED RELEASE ORAL at 06:07

## 2022-08-20 RX ADMIN — Medication 400 MILLIGRAM(S): at 14:24

## 2022-08-20 NOTE — PROGRESS NOTE ADULT - SUBJECTIVE AND OBJECTIVE BOX
Day __1_ of Anesthesia Pain Management Service    SUBJECTIVE:    Pain Scale Score	At rest: ___ 	With Activity: ___ 	[ x] Refer to charted pain scores    THERAPY:    [ ] IV PCA Morphine		[ ] 5 mg/mL	[ ] 1 mg/mL  [x ] IV PCA Hydromorphone	[ ] 5 mg/mL	[x ] 1 mg/mL  [ ] IV PCA Fentanyl		[ ] 50 micrograms/mL    Demand dose 0.2mg    lockout  6  (minutes) 0Continuous Rate          MEDICATIONS  (STANDING):  acetaminophen   IVPB .. 1000 milliGRAM(s) IV Intermittent every 6 hours  chlorhexidine 2% Cloths 1 Application(s) Topical daily  enoxaparin Injectable 40 milliGRAM(s) SubCutaneous every 24 hours  HYDROmorphone PCA (1 mG/mL) 30 milliLiter(s) PCA Continuous PCA Continuous  lactated ringers. 1000 milliLiter(s) (125 mL/Hr) IV Continuous <Continuous>  pantoprazole  Injectable 80 milliGRAM(s) IV Push two times a day  piperacillin/tazobactam IVPB.. 3.375 Gram(s) IV Intermittent <User Schedule>    MEDICATIONS  (PRN):  HYDROmorphone PCA (1 mG/mL) Rescue Clinician Bolus 0.5 milliGRAM(s) IV Push every 15 minutes PRN for Pain Scale GREATER THAN 6  naloxone Injectable 0.1 milliGRAM(s) IV Push every 3 minutes PRN For ANY of the following changes in patient status:  A. RR LESS THAN 10 breaths per minute, B. Oxygen saturation LESS THAN 90%, C. Sedation score of 6  ondansetron Injectable 4 milliGRAM(s) IV Push every 6 hours PRN Nausea      OBJECTIVE:    Sedation Score:	[x ] Alert	[ ] Drowsy 	[ ] Arousable	[ ] Asleep	[ ] Unresponsive    Side Effects:	[ x] None	[ ] Nausea	[ ] Vomiting	[ ] Pruritus  		[ ] Other:    Vital Signs Last 24 Hrs  T(C): 37.2 (20 Aug 2022 10:12), Max: 37.2 (20 Aug 2022 10:12)  T(F): 98.9 (20 Aug 2022 10:12), Max: 98.9 (20 Aug 2022 10:12)  HR: 73 (20 Aug 2022 10:12) (57 - 76)  BP: 121/73 (20 Aug 2022 10:12) (93/59 - 139/67)  BP(mean): 87 (19 Aug 2022 18:00) (69 - 97)  RR: 18 (20 Aug 2022 10:12) (14 - 18)  SpO2: 98% (20 Aug 2022 10:12) (96% - 100%)    Parameters below as of 20 Aug 2022 10:12  Patient On (Oxygen Delivery Method): room air        ASSESSMENT/ PLAN    Therapy to  be:	[x ] Continue   [ ] Discontinued   [ ] Change to prn Analgesics    Documentation and Verification of current medications:   [X] Done	[ ] Not done, not elligible    Comments: I was physically present for the key portionjs of the evaluation and management service provided. I agree with the above history, physical, and plan which I have reviewed and edited where appropriate

## 2022-08-20 NOTE — PROVIDER CONTACT NOTE (OTHER) - ACTION/TREATMENT ORDERED:
Dr. Davidson made aware. Stated okay for patient to rest for the night and attempt first out of bed in AM.

## 2022-08-20 NOTE — PROGRESS NOTE ADULT - SUBJECTIVE AND OBJECTIVE BOX
SURGERY DAILY PROGRESS NOTE    --------------- OVERNIGHT ---------------  - No acute events overnight     --------------- SUBJECTIVE ---------------    - Denies CP, SOB, n/v, abdomina pain. -/- BM +void  - Patient seen and examined at bedside with surgical team.      --------------- OBJECTIVE ---------------    -----VITALS-----  T(C): 36.7, Max: 36.8 (08-19)  HR: 69 (50 - 76)  BP: 102/61 (93/59 - 171/84)  RR: 17 (14 - 20)  SpO2: 99% (96% - 100%) nasal cannula 2      -----PHYSICAL EXAM-----  General: NAD, resting comfortably in bed  HEENT: Normocephalic atraumatic  Respiratory: Nonlabored respirations, normal CW expansion.  Cardio: S1S2, regular rate and rhythm.  Abdomen: soft, non-distended appropriately tender, surgical incisions are c/d/i. NGT on wall suction  Vascular: extremities are warm and well perfused.     -----DRAINS-----  DINAH:   OUT: 330 mL       Chest Tube:      NG Tube:       -----INs & OUTs-----    08-19  -  08-20  --------------------------------------------------------  IN:    IV PiggyBack: 175 mL    IV PiggyBack: 100 mL    Lactated Ringers: 375 mL  Total IN: 650 mL    OUT:    Accordian (mL): 120 mL    Bulb (mL): 210 mL    Indwelling Catheter - Urethral (mL): 390 mL  Total OUT: 720 mL          -----MEDICATIONS-----  STANDING  acetaminophen   IVPB .. 1000 milliGRAM(s) IV Intermittent every 6 hours  chlorhexidine 2% Cloths 1 Application(s) Topical daily  enoxaparin Injectable 40 milliGRAM(s) SubCutaneous every 24 hours  HYDROmorphone PCA (1 mG/mL) 30 milliLiter(s) PCA Continuous PCA Continuous  lactated ringers. 1000 milliLiter(s) (125 mL/Hr) IV Continuous <Continuous>  pantoprazole  Injectable 80 milliGRAM(s) IV Push two times a day  piperacillin/tazobactam IVPB.. 3.375 Gram(s) IV Intermittent <User Schedule>    PRN  HYDROmorphone PCA (1 mG/mL) Rescue Clinician Bolus 0.5 milliGRAM(s) IV Push every 15 minutes PRN for Pain Scale GREATER THAN 6  naloxone Injectable 0.1 milliGRAM(s) IV Push every 3 minutes PRN For ANY of the following changes in patient status:  A. RR LESS THAN 10 breaths per minute, B. Oxygen saturation LESS THAN 90%, C. Sedation score of 6  ondansetron Injectable 4 milliGRAM(s) IV Push every 6 hours PRN Nausea      -----LABS-----  139  |  104  |  13  ----------------------------<  132<H>    (08-19)  4.0   |  23  |  0.77            Ca    7.1<L>      08-19  Mg    1.8  Phos  3.8          PT: 12.1 sec     08-19  aPTT: 25.5 sec   INR: 1.04 ratio

## 2022-08-20 NOTE — PROGRESS NOTE ADULT - ASSESSMENT
Assessment:  67yMale patient S/P exploratory laparotomy for pneumoperitoneum 2/2 duodenal perforation    Plan:  - Pain control PRN  - Diet: NPO  - Activity: PT/OP eval pending  - DVT ppx    ACS/Trauma Surgery  [id number].

## 2022-08-21 LAB
ANION GAP SERPL CALC-SCNC: 4 MMOL/L — LOW (ref 5–17)
ANION GAP SERPL CALC-SCNC: 7 MMOL/L — SIGNIFICANT CHANGE UP (ref 5–17)
BILIRUB FLD-MCNC: 1.2 MG/DL — SIGNIFICANT CHANGE UP
BUN SERPL-MCNC: 15 MG/DL — SIGNIFICANT CHANGE UP (ref 7–23)
BUN SERPL-MCNC: 17 MG/DL — SIGNIFICANT CHANGE UP (ref 7–23)
CALCIUM SERPL-MCNC: 7.5 MG/DL — LOW (ref 8.4–10.5)
CALCIUM SERPL-MCNC: 7.5 MG/DL — LOW (ref 8.4–10.5)
CHLORIDE SERPL-SCNC: 101 MMOL/L — SIGNIFICANT CHANGE UP (ref 96–108)
CHLORIDE SERPL-SCNC: 101 MMOL/L — SIGNIFICANT CHANGE UP (ref 96–108)
CO2 SERPL-SCNC: 25 MMOL/L — SIGNIFICANT CHANGE UP (ref 22–31)
CO2 SERPL-SCNC: 26 MMOL/L — SIGNIFICANT CHANGE UP (ref 22–31)
CREAT SERPL-MCNC: 0.86 MG/DL — SIGNIFICANT CHANGE UP (ref 0.5–1.3)
CREAT SERPL-MCNC: 0.87 MG/DL — SIGNIFICANT CHANGE UP (ref 0.5–1.3)
EGFR: 93 ML/MIN/1.73M2 — SIGNIFICANT CHANGE UP
EGFR: 94 ML/MIN/1.73M2 — SIGNIFICANT CHANGE UP
GLUCOSE SERPL-MCNC: 159 MG/DL — HIGH (ref 70–99)
GLUCOSE SERPL-MCNC: 162 MG/DL — HIGH (ref 70–99)
HCT VFR BLD CALC: 43.7 % — SIGNIFICANT CHANGE UP (ref 39–50)
HGB BLD-MCNC: 14.2 G/DL — SIGNIFICANT CHANGE UP (ref 13–17)
MAGNESIUM SERPL-MCNC: 2.1 MG/DL — SIGNIFICANT CHANGE UP (ref 1.6–2.6)
MAGNESIUM SERPL-MCNC: 2.1 MG/DL — SIGNIFICANT CHANGE UP (ref 1.6–2.6)
MCHC RBC-ENTMCNC: 30.3 PG — SIGNIFICANT CHANGE UP (ref 27–34)
MCHC RBC-ENTMCNC: 32.5 GM/DL — SIGNIFICANT CHANGE UP (ref 32–36)
MCV RBC AUTO: 93.4 FL — SIGNIFICANT CHANGE UP (ref 80–100)
NRBC # BLD: 0 /100 WBCS — SIGNIFICANT CHANGE UP (ref 0–0)
PHOSPHATE SERPL-MCNC: 0.8 MG/DL — CRITICAL LOW (ref 2.5–4.5)
PHOSPHATE SERPL-MCNC: 1.2 MG/DL — LOW (ref 2.5–4.5)
PLATELET # BLD AUTO: 158 K/UL — SIGNIFICANT CHANGE UP (ref 150–400)
POTASSIUM SERPL-MCNC: 4 MMOL/L — SIGNIFICANT CHANGE UP (ref 3.5–5.3)
POTASSIUM SERPL-MCNC: 4 MMOL/L — SIGNIFICANT CHANGE UP (ref 3.5–5.3)
POTASSIUM SERPL-SCNC: 4 MMOL/L — SIGNIFICANT CHANGE UP (ref 3.5–5.3)
POTASSIUM SERPL-SCNC: 4 MMOL/L — SIGNIFICANT CHANGE UP (ref 3.5–5.3)
RBC # BLD: 4.68 M/UL — SIGNIFICANT CHANGE UP (ref 4.2–5.8)
RBC # FLD: 12.9 % — SIGNIFICANT CHANGE UP (ref 10.3–14.5)
SODIUM SERPL-SCNC: 131 MMOL/L — LOW (ref 135–145)
SODIUM SERPL-SCNC: 133 MMOL/L — LOW (ref 135–145)
WBC # BLD: 9.36 K/UL — SIGNIFICANT CHANGE UP (ref 3.8–10.5)
WBC # FLD AUTO: 9.36 K/UL — SIGNIFICANT CHANGE UP (ref 3.8–10.5)

## 2022-08-21 RX ORDER — ACETAMINOPHEN 500 MG
1000 TABLET ORAL EVERY 6 HOURS
Refills: 0 | Status: COMPLETED | OUTPATIENT
Start: 2022-08-21 | End: 2022-08-22

## 2022-08-21 RX ADMIN — HYDROMORPHONE HYDROCHLORIDE 30 MILLILITER(S): 2 INJECTION INTRAMUSCULAR; INTRAVENOUS; SUBCUTANEOUS at 07:20

## 2022-08-21 RX ADMIN — PANTOPRAZOLE SODIUM 80 MILLIGRAM(S): 20 TABLET, DELAYED RELEASE ORAL at 06:02

## 2022-08-21 RX ADMIN — Medication 85 MILLIMOLE(S): at 18:55

## 2022-08-21 RX ADMIN — Medication 1000 MILLIGRAM(S): at 16:20

## 2022-08-21 RX ADMIN — Medication 400 MILLIGRAM(S): at 22:04

## 2022-08-21 RX ADMIN — DEXTROSE MONOHYDRATE, SODIUM CHLORIDE, AND POTASSIUM CHLORIDE 125 MILLILITER(S): 50; .745; 4.5 INJECTION, SOLUTION INTRAVENOUS at 22:04

## 2022-08-21 RX ADMIN — ENOXAPARIN SODIUM 40 MILLIGRAM(S): 100 INJECTION SUBCUTANEOUS at 18:56

## 2022-08-21 RX ADMIN — HYDROMORPHONE HYDROCHLORIDE 30 MILLILITER(S): 2 INJECTION INTRAMUSCULAR; INTRAVENOUS; SUBCUTANEOUS at 19:14

## 2022-08-21 RX ADMIN — PIPERACILLIN AND TAZOBACTAM 25 GRAM(S): 4; .5 INJECTION, POWDER, LYOPHILIZED, FOR SOLUTION INTRAVENOUS at 06:02

## 2022-08-21 RX ADMIN — Medication 85 MILLIMOLE(S): at 11:39

## 2022-08-21 RX ADMIN — PIPERACILLIN AND TAZOBACTAM 25 GRAM(S): 4; .5 INJECTION, POWDER, LYOPHILIZED, FOR SOLUTION INTRAVENOUS at 22:04

## 2022-08-21 RX ADMIN — Medication 1000 MILLIGRAM(S): at 22:34

## 2022-08-21 RX ADMIN — PANTOPRAZOLE SODIUM 80 MILLIGRAM(S): 20 TABLET, DELAYED RELEASE ORAL at 18:56

## 2022-08-21 RX ADMIN — PIPERACILLIN AND TAZOBACTAM 25 GRAM(S): 4; .5 INJECTION, POWDER, LYOPHILIZED, FOR SOLUTION INTRAVENOUS at 15:34

## 2022-08-21 RX ADMIN — Medication 400 MILLIGRAM(S): at 15:34

## 2022-08-21 RX ADMIN — CHLORHEXIDINE GLUCONATE 1 APPLICATION(S): 213 SOLUTION TOPICAL at 16:26

## 2022-08-21 NOTE — PHYSICAL THERAPY INITIAL EVALUATION ADULT - PLANNED THERAPY INTERVENTIONS, PT EVAL
Patient will be able to negotiate 3 stairs with handrail supervision in 2 weeks./balance training/bed mobility training/gait training/strengthening/transfer training

## 2022-08-21 NOTE — PHYSICAL THERAPY INITIAL EVALUATION ADULT - PERTINENT HX OF CURRENT PROBLEM, REHAB EVAL
68yo Cantonese speaking male patient S/P exploratory laparotomy for pneumoperitoneum 2/2 duodenal perforation 66yo Cantonese speaking male patient S/P exploratory laparotomy for pneumoperitoneum 2/2 duodenal perforation. 8/19: CT Chest: There is pylorus/proximal duodenal wall thickening and small intramural   bubbles of gas associated with moderate pneumoperitoneum/small ascites likely representing viscus rupture. Thickening of the loops of the jejunum possibly reactive enteritis. No dissection or pulmonary embolus.

## 2022-08-21 NOTE — PHYSICAL THERAPY INITIAL EVALUATION ADULT - TRANSFER TRAINING, PT EVAL
GOAL: Patient will be able to perform transfers sit to stand min w/appropriate AD independently  in 2 weeks

## 2022-08-21 NOTE — PROGRESS NOTE ADULT - SUBJECTIVE AND OBJECTIVE BOX
Trauma Surgery Progress Note  Patient is a 67y old  Male who presents with a chief complaint of pneumoperitoneum (20 Aug 2022 11:16)      INTERVAL EVENTS: No acute events overnight.  SUBJECTIVE: Patient seen and examined at bedside with surgical team, patient without complaints. Denies fever, chills, CP, SOB nausea, vomiting, abdominal pain.    REVIEW OF SYSTEMS:  Constitutional: No fevers or chills. No malaise or weakness.  EENT: No vision changes. No ear pain. No nasal congestion or rhinitis. No throat pain or dysphagia.  Respiratory: No cough, wheezing, or SOB. No hemoptysis.  Cardiovascular: No chest pain or palpitations.  Gastrointestinal: No abdominal pain. No nausea, vomiting, diarrhea or constipation. No hematochezia. No melena.  Genitourinary: No dysuria, hematuria, or frequency.  Neurologic: No numbness or tingling. No weakness.  Skin: No rashes or pruritus.     OBJECTIVE:    Vital Signs Last 24 Hrs  T(C): 36.8 (20 Aug 2022 21:42), Max: 37.2 (20 Aug 2022 10:12)  T(F): 98.2 (20 Aug 2022 21:42), Max: 98.9 (20 Aug 2022 10:12)  HR: 86 (20 Aug 2022 21:42) (59 - 86)  BP: 132/81 (20 Aug 2022 21:42) (101/63 - 132/81)  BP(mean): --  RR: 18 (20 Aug 2022 21:42) (18 - 18)  SpO2: 95% (20 Aug 2022 21:42) (95% - 99%)    Parameters below as of 20 Aug 2022 21:42  Patient On (Oxygen Delivery Method): room air    I&O's Detail    19 Aug 2022 07:01  -  20 Aug 2022 07:00  --------------------------------------------------------  IN:    IV PiggyBack: 200 mL    IV PiggyBack: 275 mL    Lactated Ringers: 1875 mL  Total IN: 2350 mL    OUT:    Accordian (mL): 120 mL    Bulb (mL): 830 mL    Indwelling Catheter - Urethral (mL): 890 mL    Nasogastric/Oral tube (mL): 50 mL  Total OUT: 1890 mL    Total NET: 460 mL      20 Aug 2022 07:01  -  21 Aug 2022 00:37  --------------------------------------------------------  IN:    IV PiggyBack: 100 mL  Total IN: 100 mL    OUT:    Bulb (mL): 690 mL    Indwelling Catheter - Urethral (mL): 400 mL    Nasogastric/Oral tube (mL): 500 mL    Oral Fluid: 0 mL  Total OUT: 1590 mL    Total NET: -1490 mL      MEDICATIONS  (STANDING):  chlorhexidine 2% Cloths 1 Application(s) Topical daily  dextrose 5% + sodium chloride 0.45% with potassium chloride 20 mEq/L 1000 milliLiter(s) (125 mL/Hr) IV Continuous <Continuous>  enoxaparin Injectable 40 milliGRAM(s) SubCutaneous every 24 hours  HYDROmorphone PCA (1 mG/mL) 30 milliLiter(s) PCA Continuous PCA Continuous  pantoprazole  Injectable 80 milliGRAM(s) IV Push two times a day  piperacillin/tazobactam IVPB.. 3.375 Gram(s) IV Intermittent <User Schedule>    MEDICATIONS  (PRN):  HYDROmorphone PCA (1 mG/mL) Rescue Clinician Bolus 0.5 milliGRAM(s) IV Push every 15 minutes PRN for Pain Scale GREATER THAN 6  naloxone Injectable 0.1 milliGRAM(s) IV Push every 3 minutes PRN For ANY of the following changes in patient status:  A. RR LESS THAN 10 breaths per minute, B. Oxygen saturation LESS THAN 90%, C. Sedation score of 6  ondansetron Injectable 4 milliGRAM(s) IV Push every 6 hours PRN Nausea  tetracaine/benzocaine/butamben Spray 1 Spray(s) Topical every 6 hours PRN Sore throat      PHYSICAL EXAM:  Constitutional: A&Ox3, NAD  Respiratory: Unlabored breathing  Abdomen: Soft, nondistended, NTTP. No rebound or guarding.  Extremities: WWP, LOREDO spontaneously    LABS:                        14.5   5.22  )-----------( 164      ( 20 Aug 2022 06:25 )             45.2     08-20    137  |  103  |  13  ----------------------------<  107<H>  4.0   |  23  |  0.92    Ca    6.9<L>      20 Aug 2022 06:25  Phos  3.7     08-20  Mg     1.8     08-20    TPro  4.9<L>  /  Alb  2.5<L>  /  TBili  0.6  /  DBili  x   /  AST  26  /  ALT  21  /  AlkPhos  41  08-20    PT/INR - ( 19 Aug 2022 08:53 )   PT: 12.1 sec;   INR: 1.04 ratio         PTT - ( 19 Aug 2022 08:53 )  PTT:25.5 sec  LIVER FUNCTIONS - ( 20 Aug 2022 06:25 )  Alb: 2.5 g/dL / Pro: 4.9 g/dL / ALK PHOS: 41 U/L / ALT: 21 U/L / AST: 26 U/L / GGT: x                 IMAGING:     Trauma Surgery Progress Note  Patient is a 67y old  Male who presents with a chief complaint of pneumoperitoneum (20 Aug 2022 11:16)      INTERVAL EVENTS: No acute events overnight.  SUBJECTIVE: Patient seen and examined at bedside with surgical team, patient without complaints. Denies fever, chills, CP, SOB nausea, vomiting, abdominal pain.    REVIEW OF SYSTEMS:  Constitutional: No fevers or chills. No malaise or weakness.  EENT: No vision changes. No ear pain. No nasal congestion or rhinitis. No throat pain or dysphagia.  Respiratory: No cough, wheezing, or SOB. No hemoptysis.  Cardiovascular: No chest pain or palpitations.  Gastrointestinal: No abdominal pain. No nausea, vomiting, diarrhea or constipation. No hematochezia. No melena.  Genitourinary: No dysuria, hematuria, or frequency.  Neurologic: No numbness or tingling. No weakness.  Skin: No rashes or pruritus.     OBJECTIVE:    Vital Signs Last 24 Hrs  T(C): 36.8 (20 Aug 2022 21:42), Max: 37.2 (20 Aug 2022 10:12)  T(F): 98.2 (20 Aug 2022 21:42), Max: 98.9 (20 Aug 2022 10:12)  HR: 86 (20 Aug 2022 21:42) (59 - 86)  BP: 132/81 (20 Aug 2022 21:42) (101/63 - 132/81)  BP(mean): --  RR: 18 (20 Aug 2022 21:42) (18 - 18)  SpO2: 95% (20 Aug 2022 21:42) (95% - 99%)    Parameters below as of 20 Aug 2022 21:42  Patient On (Oxygen Delivery Method): room air    I&O's Detail    19 Aug 2022 07:01  -  20 Aug 2022 07:00  --------------------------------------------------------  IN:    IV PiggyBack: 200 mL    IV PiggyBack: 275 mL    Lactated Ringers: 1875 mL  Total IN: 2350 mL    OUT:    Accordian (mL): 120 mL    Bulb (mL): 830 mL    Indwelling Catheter - Urethral (mL): 890 mL    Nasogastric/Oral tube (mL): 50 mL  Total OUT: 1890 mL    Total NET: 460 mL      20 Aug 2022 07:01  -  21 Aug 2022 00:37  --------------------------------------------------------  IN:    IV PiggyBack: 100 mL  Total IN: 100 mL    OUT:    Bulb (mL): 690 mL    Indwelling Catheter - Urethral (mL): 400 mL    Nasogastric/Oral tube (mL): 500 mL    Oral Fluid: 0 mL  Total OUT: 1590 mL    Total NET: -1490 mL      MEDICATIONS  (STANDING):  chlorhexidine 2% Cloths 1 Application(s) Topical daily  dextrose 5% + sodium chloride 0.45% with potassium chloride 20 mEq/L 1000 milliLiter(s) (125 mL/Hr) IV Continuous <Continuous>  enoxaparin Injectable 40 milliGRAM(s) SubCutaneous every 24 hours  HYDROmorphone PCA (1 mG/mL) 30 milliLiter(s) PCA Continuous PCA Continuous  pantoprazole  Injectable 80 milliGRAM(s) IV Push two times a day  piperacillin/tazobactam IVPB.. 3.375 Gram(s) IV Intermittent <User Schedule>    MEDICATIONS  (PRN):  HYDROmorphone PCA (1 mG/mL) Rescue Clinician Bolus 0.5 milliGRAM(s) IV Push every 15 minutes PRN for Pain Scale GREATER THAN 6  naloxone Injectable 0.1 milliGRAM(s) IV Push every 3 minutes PRN For ANY of the following changes in patient status:  A. RR LESS THAN 10 breaths per minute, B. Oxygen saturation LESS THAN 90%, C. Sedation score of 6  ondansetron Injectable 4 milliGRAM(s) IV Push every 6 hours PRN Nausea  tetracaine/benzocaine/butamben Spray 1 Spray(s) Topical every 6 hours PRN Sore throat      PHYSICAL EXAM:  Constitutional: A&Ox3, NAD  Respiratory: Unlabored breathing  Abdomen: Soft, nondistended, Tender to palpation at midline incision site, No rebound or guarding, NG tube & DINAH drain draining serosanguinous  Pelvis: tsang in  Extremities: WWP, LOREDO spontaneously    LABS:                        14.5   5.22  )-----------( 164      ( 20 Aug 2022 06:25 )             45.2     08-20    137  |  103  |  13  ----------------------------<  107<H>  4.0   |  23  |  0.92    Ca    6.9<L>      20 Aug 2022 06:25  Phos  3.7     08-20  Mg     1.8     08-20    TPro  4.9<L>  /  Alb  2.5<L>  /  TBili  0.6  /  DBili  x   /  AST  26  /  ALT  21  /  AlkPhos  41  08-20    PT/INR - ( 19 Aug 2022 08:53 )   PT: 12.1 sec;   INR: 1.04 ratio         PTT - ( 19 Aug 2022 08:53 )  PTT:25.5 sec  LIVER FUNCTIONS - ( 20 Aug 2022 06:25 )  Alb: 2.5 g/dL / Pro: 4.9 g/dL / ALK PHOS: 41 U/L / ALT: 21 U/L / AST: 26 U/L / GGT: x                 IMAGING:     Trauma Surgery Progress Note  Patient is a 67y old  Male who presents with a chief complaint of abdominal pain (20 Aug 2022 11:16)      INTERVAL EVENTS: No acute events overnight.  SUBJECTIVE: Patient seen and examined at bedside with surgical team, patient without complaints. Denies fever, chills, CP, SOB nausea, vomiting, abdominal pain.    REVIEW OF SYSTEMS:  Constitutional: No fevers or chills. No malaise or weakness.  EENT: No vision changes. No ear pain. No nasal congestion or rhinitis. No throat pain or dysphagia.  Respiratory: No cough, wheezing, or SOB. No hemoptysis.  Cardiovascular: No chest pain or palpitations.  Gastrointestinal: No abdominal pain. No nausea, vomiting, diarrhea or constipation. No hematochezia. No melena.  Genitourinary: No dysuria, hematuria, or frequency.  Neurologic: No numbness or tingling. No weakness.  Skin: No rashes or pruritus.     OBJECTIVE:    Vital Signs Last 24 Hrs  T(C): 36.8 (20 Aug 2022 21:42), Max: 37.2 (20 Aug 2022 10:12)  T(F): 98.2 (20 Aug 2022 21:42), Max: 98.9 (20 Aug 2022 10:12)  HR: 86 (20 Aug 2022 21:42) (59 - 86)  BP: 132/81 (20 Aug 2022 21:42) (101/63 - 132/81)  BP(mean): --  RR: 18 (20 Aug 2022 21:42) (18 - 18)  SpO2: 95% (20 Aug 2022 21:42) (95% - 99%)    Parameters below as of 20 Aug 2022 21:42  Patient On (Oxygen Delivery Method): room air    I&O's Detail    19 Aug 2022 07:01  -  20 Aug 2022 07:00  --------------------------------------------------------  IN:    IV PiggyBack: 200 mL    IV PiggyBack: 275 mL    Lactated Ringers: 1875 mL  Total IN: 2350 mL    OUT:    Accordian (mL): 120 mL    Bulb (mL): 830 mL    Indwelling Catheter - Urethral (mL): 890 mL    Nasogastric/Oral tube (mL): 50 mL  Total OUT: 1890 mL    Total NET: 460 mL      20 Aug 2022 07:01  -  21 Aug 2022 00:37  --------------------------------------------------------  IN:    IV PiggyBack: 100 mL  Total IN: 100 mL    OUT:    Bulb (mL): 690 mL    Indwelling Catheter - Urethral (mL): 400 mL    Nasogastric/Oral tube (mL): 500 mL    Oral Fluid: 0 mL  Total OUT: 1590 mL    Total NET: -1490 mL      MEDICATIONS  (STANDING):  chlorhexidine 2% Cloths 1 Application(s) Topical daily  dextrose 5% + sodium chloride 0.45% with potassium chloride 20 mEq/L 1000 milliLiter(s) (125 mL/Hr) IV Continuous <Continuous>  enoxaparin Injectable 40 milliGRAM(s) SubCutaneous every 24 hours  HYDROmorphone PCA (1 mG/mL) 30 milliLiter(s) PCA Continuous PCA Continuous  pantoprazole  Injectable 80 milliGRAM(s) IV Push two times a day  piperacillin/tazobactam IVPB.. 3.375 Gram(s) IV Intermittent <User Schedule>    MEDICATIONS  (PRN):  HYDROmorphone PCA (1 mG/mL) Rescue Clinician Bolus 0.5 milliGRAM(s) IV Push every 15 minutes PRN for Pain Scale GREATER THAN 6  naloxone Injectable 0.1 milliGRAM(s) IV Push every 3 minutes PRN For ANY of the following changes in patient status:  A. RR LESS THAN 10 breaths per minute, B. Oxygen saturation LESS THAN 90%, C. Sedation score of 6  ondansetron Injectable 4 milliGRAM(s) IV Push every 6 hours PRN Nausea  tetracaine/benzocaine/butamben Spray 1 Spray(s) Topical every 6 hours PRN Sore throat      PHYSICAL EXAM:  Constitutional: A&Ox3, NAD  Respiratory: Unlabored breathing  Abdomen: Soft, nondistended, Tender to palpation at midline incision site, No rebound or guarding, NG tube & DINAH drain draining serosanguinous  Pelvis: tsang in  Extremities: WWP, LOREDO spontaneously    LABS:                        14.5   5.22  )-----------( 164      ( 20 Aug 2022 06:25 )             45.2     08-20    137  |  103  |  13  ----------------------------<  107<H>  4.0   |  23  |  0.92    Ca    6.9<L>      20 Aug 2022 06:25  Phos  3.7     08-20  Mg     1.8     08-20    TPro  4.9<L>  /  Alb  2.5<L>  /  TBili  0.6  /  DBili  x   /  AST  26  /  ALT  21  /  AlkPhos  41  08-20    PT/INR - ( 19 Aug 2022 08:53 )   PT: 12.1 sec;   INR: 1.04 ratio         PTT - ( 19 Aug 2022 08:53 )  PTT:25.5 sec  LIVER FUNCTIONS - ( 20 Aug 2022 06:25 )  Alb: 2.5 g/dL / Pro: 4.9 g/dL / ALK PHOS: 41 U/L / ALT: 21 U/L / AST: 26 U/L / GGT: x                 IMAGING:

## 2022-08-21 NOTE — PROGRESS NOTE ADULT - SUBJECTIVE AND OBJECTIVE BOX
Day __2_ of Anesthesia Pain Management Service    SUBJECTIVE:    Pain Scale Score	At rest: ___ 	With Activity: ___ 	[ x] Refer to charted pain scores    THERAPY:    [ ] IV PCA Morphine		[ ] 5 mg/mL	[ ] 1 mg/mL  [x ] IV PCA Hydromorphone	[ ] 5 mg/mL	[x ] 1 mg/mL  [ ] IV PCA Fentanyl		[ ] 50 micrograms/mL    Demand dose 0.2mg    lockout  6  (minutes) 0Continuous Rate          MEDICATIONS  (STANDING):  chlorhexidine 2% Cloths 1 Application(s) Topical daily  dextrose 5% + sodium chloride 0.45% with potassium chloride 20 mEq/L 1000 milliLiter(s) (125 mL/Hr) IV Continuous <Continuous>  enoxaparin Injectable 40 milliGRAM(s) SubCutaneous every 24 hours  HYDROmorphone PCA (1 mG/mL) 30 milliLiter(s) PCA Continuous PCA Continuous  pantoprazole  Injectable 80 milliGRAM(s) IV Push two times a day  piperacillin/tazobactam IVPB.. 3.375 Gram(s) IV Intermittent <User Schedule>    MEDICATIONS  (PRN):  HYDROmorphone PCA (1 mG/mL) Rescue Clinician Bolus 0.5 milliGRAM(s) IV Push every 15 minutes PRN for Pain Scale GREATER THAN 6  naloxone Injectable 0.1 milliGRAM(s) IV Push every 3 minutes PRN For ANY of the following changes in patient status:  A. RR LESS THAN 10 breaths per minute, B. Oxygen saturation LESS THAN 90%, C. Sedation score of 6  ondansetron Injectable 4 milliGRAM(s) IV Push every 6 hours PRN Nausea  tetracaine/benzocaine/butamben Spray 1 Spray(s) Topical every 6 hours PRN Sore throat      OBJECTIVE:    Sedation Score:	[x ] Alert	[ ] Drowsy 	[ ] Arousable	[ ] Asleep	[ ] Unresponsive    Side Effects:	[ x] None	[ ] Nausea	[ ] Vomiting	[ ] Pruritus  		[ ] Other:    Vital Signs Last 24 Hrs  T(C): 36.9 (21 Aug 2022 09:26), Max: 37.2 (20 Aug 2022 10:12)  T(F): 98.5 (21 Aug 2022 09:26), Max: 98.9 (20 Aug 2022 10:12)  HR: 96 (21 Aug 2022 09:26) (73 - 96)  BP: 143/84 (21 Aug 2022 09:26) (107/61 - 143/84)  BP(mean): --  RR: 18 (21 Aug 2022 06:50) (18 - 18)  SpO2: 95% (21 Aug 2022 06:50) (95% - 98%)    Parameters below as of 21 Aug 2022 06:50  Patient On (Oxygen Delivery Method): room air        ASSESSMENT/ PLAN    Therapy to  be:	[x ] Continue   [ ] Discontinued   [ ] Change to prn Analgesics    Documentation and Verification of current medications:   [X] Done	[ ] Not done, not elligible    Comments: I was physically present for the key portionjs of the evaluation and management service provided. I agree with the above history, physical, and plan which I have reviewed and edited where appropriate

## 2022-08-21 NOTE — PHYSICAL THERAPY INITIAL EVALUATION ADULT - GAIT DEVIATIONS NOTED, PT EVAL
decreased deysi/increased time in double stance/decreased velocity of limb motion/decreased step length/decreased stride length

## 2022-08-21 NOTE — PROGRESS NOTE ADULT - ASSESSMENT
Assessment:  67yMale patient S/P exploratory laparotomy for pneumoperitoneum 2/2 duodenal perforation    Plan:  - Pain control PRN  - Diet: NPO  - Activity: PT/OP eval pending  - DVT ppx    ACS/Trauma Surgery  5079. Assessment:  67yMale patient S/P exploratory laparotomy for pneumoperitoneum 2/2 duodenal perforation    Plan:  - multimodal pain control  - Diet: NPO  - Activity: PT/OP eval pending  - DVT ppx  - Remove tsang: TOKARIS @8pm    ACS/Trauma Surgery  [id number].

## 2022-08-21 NOTE — PHYSICAL THERAPY INITIAL EVALUATION ADULT - ADDITIONAL COMMENTS
Pt lives in  3 stairs to enter, +handrail, 1st floor setup, with wife. PTA pt was independent with all ADLs. Pt does not own DME.

## 2022-08-22 LAB
ALBUMIN SERPL ELPH-MCNC: 2.1 G/DL — LOW (ref 3.3–5)
ALP SERPL-CCNC: 54 U/L — SIGNIFICANT CHANGE UP (ref 40–120)
ALT FLD-CCNC: 19 U/L — SIGNIFICANT CHANGE UP (ref 10–45)
ANION GAP SERPL CALC-SCNC: 6 MMOL/L — SIGNIFICANT CHANGE UP (ref 5–17)
ANION GAP SERPL CALC-SCNC: 7 MMOL/L — SIGNIFICANT CHANGE UP (ref 5–17)
AST SERPL-CCNC: 30 U/L — SIGNIFICANT CHANGE UP (ref 10–40)
BILIRUB DIRECT SERPL-MCNC: 0.2 MG/DL — SIGNIFICANT CHANGE UP (ref 0–0.3)
BILIRUB INDIRECT FLD-MCNC: 0.6 MG/DL — SIGNIFICANT CHANGE UP (ref 0.2–1)
BILIRUB SERPL-MCNC: 0.8 MG/DL — SIGNIFICANT CHANGE UP (ref 0.2–1.2)
BUN SERPL-MCNC: 11 MG/DL — SIGNIFICANT CHANGE UP (ref 7–23)
BUN SERPL-MCNC: 9 MG/DL — SIGNIFICANT CHANGE UP (ref 7–23)
CALCIUM SERPL-MCNC: 7.2 MG/DL — LOW (ref 8.4–10.5)
CALCIUM SERPL-MCNC: 7.8 MG/DL — LOW (ref 8.4–10.5)
CHLORIDE SERPL-SCNC: 100 MMOL/L — SIGNIFICANT CHANGE UP (ref 96–108)
CHLORIDE SERPL-SCNC: 103 MMOL/L — SIGNIFICANT CHANGE UP (ref 96–108)
CO2 SERPL-SCNC: 25 MMOL/L — SIGNIFICANT CHANGE UP (ref 22–31)
CO2 SERPL-SCNC: 25 MMOL/L — SIGNIFICANT CHANGE UP (ref 22–31)
CREAT SERPL-MCNC: 0.71 MG/DL — SIGNIFICANT CHANGE UP (ref 0.5–1.3)
CREAT SERPL-MCNC: 0.84 MG/DL — SIGNIFICANT CHANGE UP (ref 0.5–1.3)
EGFR: 94 ML/MIN/1.73M2 — SIGNIFICANT CHANGE UP
EGFR: 99 ML/MIN/1.73M2 — SIGNIFICANT CHANGE UP
GLUCOSE SERPL-MCNC: 118 MG/DL — HIGH (ref 70–99)
GLUCOSE SERPL-MCNC: 121 MG/DL — HIGH (ref 70–99)
HCT VFR BLD CALC: 38.3 % — LOW (ref 39–50)
HGB BLD-MCNC: 12.5 G/DL — LOW (ref 13–17)
MAGNESIUM SERPL-MCNC: 2 MG/DL — SIGNIFICANT CHANGE UP (ref 1.6–2.6)
MAGNESIUM SERPL-MCNC: 2.1 MG/DL — SIGNIFICANT CHANGE UP (ref 1.6–2.6)
MCHC RBC-ENTMCNC: 29.9 PG — SIGNIFICANT CHANGE UP (ref 27–34)
MCHC RBC-ENTMCNC: 32.6 GM/DL — SIGNIFICANT CHANGE UP (ref 32–36)
MCV RBC AUTO: 91.6 FL — SIGNIFICANT CHANGE UP (ref 80–100)
NRBC # BLD: 0 /100 WBCS — SIGNIFICANT CHANGE UP (ref 0–0)
PHOSPHATE SERPL-MCNC: 1.1 MG/DL — LOW (ref 2.5–4.5)
PHOSPHATE SERPL-MCNC: 1.1 MG/DL — LOW (ref 2.5–4.5)
PHOSPHATE SERPL-MCNC: 1.6 MG/DL — LOW (ref 2.5–4.5)
PLATELET # BLD AUTO: 157 K/UL — SIGNIFICANT CHANGE UP (ref 150–400)
POTASSIUM SERPL-MCNC: 3.6 MMOL/L — SIGNIFICANT CHANGE UP (ref 3.5–5.3)
POTASSIUM SERPL-MCNC: 3.7 MMOL/L — SIGNIFICANT CHANGE UP (ref 3.5–5.3)
POTASSIUM SERPL-SCNC: 3.6 MMOL/L — SIGNIFICANT CHANGE UP (ref 3.5–5.3)
POTASSIUM SERPL-SCNC: 3.7 MMOL/L — SIGNIFICANT CHANGE UP (ref 3.5–5.3)
PROT SERPL-MCNC: 5.2 G/DL — LOW (ref 6–8.3)
RBC # BLD: 4.18 M/UL — LOW (ref 4.2–5.8)
RBC # FLD: 12.7 % — SIGNIFICANT CHANGE UP (ref 10.3–14.5)
SODIUM SERPL-SCNC: 132 MMOL/L — LOW (ref 135–145)
SODIUM SERPL-SCNC: 134 MMOL/L — LOW (ref 135–145)
WBC # BLD: 10.44 K/UL — SIGNIFICANT CHANGE UP (ref 3.8–10.5)
WBC # FLD AUTO: 10.44 K/UL — SIGNIFICANT CHANGE UP (ref 3.8–10.5)

## 2022-08-22 RX ORDER — ACETAMINOPHEN 500 MG
1000 TABLET ORAL ONCE
Refills: 0 | Status: COMPLETED | OUTPATIENT
Start: 2022-08-22 | End: 2022-08-22

## 2022-08-22 RX ORDER — POTASSIUM PHOSPHATE, MONOBASIC POTASSIUM PHOSPHATE, DIBASIC 236; 224 MG/ML; MG/ML
30 INJECTION, SOLUTION INTRAVENOUS ONCE
Refills: 0 | Status: COMPLETED | OUTPATIENT
Start: 2022-08-22 | End: 2022-08-22

## 2022-08-22 RX ADMIN — Medication 127.5 MILLIMOLE(S): at 08:30

## 2022-08-22 RX ADMIN — Medication 400 MILLIGRAM(S): at 09:49

## 2022-08-22 RX ADMIN — POTASSIUM PHOSPHATE, MONOBASIC POTASSIUM PHOSPHATE, DIBASIC 125 MILLIMOLE(S): 236; 224 INJECTION, SOLUTION INTRAVENOUS at 17:45

## 2022-08-22 RX ADMIN — ENOXAPARIN SODIUM 40 MILLIGRAM(S): 100 INJECTION SUBCUTANEOUS at 19:24

## 2022-08-22 RX ADMIN — PANTOPRAZOLE SODIUM 80 MILLIGRAM(S): 20 TABLET, DELAYED RELEASE ORAL at 05:48

## 2022-08-22 RX ADMIN — Medication 1000 MILLIGRAM(S): at 06:17

## 2022-08-22 RX ADMIN — Medication 400 MILLIGRAM(S): at 05:47

## 2022-08-22 RX ADMIN — PIPERACILLIN AND TAZOBACTAM 25 GRAM(S): 4; .5 INJECTION, POWDER, LYOPHILIZED, FOR SOLUTION INTRAVENOUS at 06:05

## 2022-08-22 RX ADMIN — CHLORHEXIDINE GLUCONATE 1 APPLICATION(S): 213 SOLUTION TOPICAL at 15:20

## 2022-08-22 RX ADMIN — Medication 400 MILLIGRAM(S): at 21:31

## 2022-08-22 RX ADMIN — PANTOPRAZOLE SODIUM 80 MILLIGRAM(S): 20 TABLET, DELAYED RELEASE ORAL at 17:48

## 2022-08-22 RX ADMIN — Medication 1000 MILLIGRAM(S): at 10:22

## 2022-08-22 RX ADMIN — PIPERACILLIN AND TAZOBACTAM 25 GRAM(S): 4; .5 INJECTION, POWDER, LYOPHILIZED, FOR SOLUTION INTRAVENOUS at 15:20

## 2022-08-22 RX ADMIN — HYDROMORPHONE HYDROCHLORIDE 30 MILLILITER(S): 2 INJECTION INTRAMUSCULAR; INTRAVENOUS; SUBCUTANEOUS at 07:28

## 2022-08-22 RX ADMIN — Medication 1000 MILLIGRAM(S): at 22:31

## 2022-08-22 RX ADMIN — HYDROMORPHONE HYDROCHLORIDE 30 MILLILITER(S): 2 INJECTION INTRAMUSCULAR; INTRAVENOUS; SUBCUTANEOUS at 19:25

## 2022-08-22 RX ADMIN — PIPERACILLIN AND TAZOBACTAM 25 GRAM(S): 4; .5 INJECTION, POWDER, LYOPHILIZED, FOR SOLUTION INTRAVENOUS at 22:20

## 2022-08-22 NOTE — PROGRESS NOTE ADULT - SUBJECTIVE AND OBJECTIVE BOX
Trauma Surgery Progress Note  Patient is a 67y old  Male who presents with a chief complaint of pneumoperitoneum (22 Aug 2022 09:15)      INTERVAL EVENTS: No acute events overnight.  SUBJECTIVE: Patient seen and examined at bedside with surgical team, patient without complaints. Denies fever, chills, CP, SOB nausea, vomiting, abdominal pain.      OBJECTIVE:    Vital Signs Last 24 Hrs  T(C): 36.8 (22 Aug 2022 08:39), Max: 37.2 (22 Aug 2022 06:39)  T(F): 98.2 (22 Aug 2022 08:39), Max: 99 (22 Aug 2022 06:39)  HR: 77 (22 Aug 2022 08:39) (77 - 93)  BP: 143/85 (22 Aug 2022 08:39) (126/68 - 145/72)  BP(mean): --  RR: 18 (22 Aug 2022 08:39) (18 - 18)  SpO2: 97% (22 Aug 2022 08:39) (94% - 97%)    Parameters below as of 22 Aug 2022 08:39  Patient On (Oxygen Delivery Method): room air    I&O's Detail    21 Aug 2022 07:01  -  22 Aug 2022 07:00  --------------------------------------------------------  IN:    dextrose 5% + sodium chloride 0.45% w/ Additives: 1500 mL  Total IN: 1500 mL    OUT:    Bulb (mL): 185 mL    Indwelling Catheter - Urethral (mL): 300 mL    Nasogastric/Oral tube (mL): 350 mL    Oral Fluid: 0 mL    Voided (mL): 700 mL  Total OUT: 1535 mL    Total NET: -35 mL      MEDICATIONS  (STANDING):  chlorhexidine 2% Cloths 1 Application(s) Topical daily  dextrose 5% + sodium chloride 0.45% with potassium chloride 20 mEq/L 1000 milliLiter(s) (125 mL/Hr) IV Continuous <Continuous>  enoxaparin Injectable 40 milliGRAM(s) SubCutaneous every 24 hours  HYDROmorphone PCA (1 mG/mL) 30 milliLiter(s) PCA Continuous PCA Continuous  pantoprazole  Injectable 80 milliGRAM(s) IV Push two times a day  piperacillin/tazobactam IVPB.. 3.375 Gram(s) IV Intermittent <User Schedule>    MEDICATIONS  (PRN):  HYDROmorphone PCA (1 mG/mL) Rescue Clinician Bolus 0.5 milliGRAM(s) IV Push every 15 minutes PRN for Pain Scale GREATER THAN 6  naloxone Injectable 0.1 milliGRAM(s) IV Push every 3 minutes PRN For ANY of the following changes in patient status:  A. RR LESS THAN 10 breaths per minute, B. Oxygen saturation LESS THAN 90%, C. Sedation score of 6  ondansetron Injectable 4 milliGRAM(s) IV Push every 6 hours PRN Nausea  tetracaine/benzocaine/butamben Spray 1 Spray(s) Topical every 6 hours PRN Sore throat      PHYSICAL EXAM:  Constitutional: A&Ox3, NAD  Respiratory: Unlabored breathing  Abdomen: Soft, nondistended, Tender to palpation at midline incision site, No rebound or guarding, NG tube & DINAH drain draining serosanguinous  Extremities: WWP, LOREDO spontaneously    LABS:                        12.5   10.44 )-----------( 157      ( 22 Aug 2022 02:02 )             38.3     08-22    134<L>  |  103  |  11  ----------------------------<  118<H>  3.7   |  25  |  0.84    Ca    7.2<L>      22 Aug 2022 02:02  Phos  1.1     08-22  Mg     2.0     08-22    TPro  5.2<L>  /  Alb  2.1<L>  /  TBili  0.8  /  DBili  0.2  /  AST  30  /  ALT  19  /  AlkPhos  54  08-22      LIVER FUNCTIONS - ( 22 Aug 2022 07:13 )  Alb: 2.1 g/dL / Pro: 5.2 g/dL / ALK PHOS: 54 U/L / ALT: 19 U/L / AST: 30 U/L / GGT: x                 IMAGING:

## 2022-08-22 NOTE — PROGRESS NOTE ADULT - SUBJECTIVE AND OBJECTIVE BOX
Day 3 of Anesthesia Pain Management Service    SUBJECTIVE: Patient is doing well with IV PCA    Pain Scale Score:	[X] Refer to charted pain scores    THERAPY:    [ ] IV PCA Morphine		[ ] 5 mg/mL	[ ] 1 mg/mL  [X] IV PCA Hydromorphone	[ ] 5 mg/mL	[X] 1 mg/mL  [ ] IV PCA Fentanyl		[ ] 50 micrograms/mL    Demand dose: 0.2 mg     Lockout: 6 minutes   Continuous Rate: 0 mg/hr  4 Hour Limit: 4 mg    MEDICATIONS  (STANDING):  acetaminophen   IVPB .. 1000 milliGRAM(s) IV Intermittent every 6 hours  chlorhexidine 2% Cloths 1 Application(s) Topical daily  dextrose 5% + sodium chloride 0.45% with potassium chloride 20 mEq/L 1000 milliLiter(s) (125 mL/Hr) IV Continuous <Continuous>  enoxaparin Injectable 40 milliGRAM(s) SubCutaneous every 24 hours  HYDROmorphone PCA (1 mG/mL) 30 milliLiter(s) PCA Continuous PCA Continuous  pantoprazole  Injectable 80 milliGRAM(s) IV Push two times a day  piperacillin/tazobactam IVPB.. 3.375 Gram(s) IV Intermittent <User Schedule>  sodium phosphate IVPB 30 milliMole(s) IV Intermittent once    MEDICATIONS  (PRN):  HYDROmorphone PCA (1 mG/mL) Rescue Clinician Bolus 0.5 milliGRAM(s) IV Push every 15 minutes PRN for Pain Scale GREATER THAN 6  naloxone Injectable 0.1 milliGRAM(s) IV Push every 3 minutes PRN For ANY of the following changes in patient status:  A. RR LESS THAN 10 breaths per minute, B. Oxygen saturation LESS THAN 90%, C. Sedation score of 6  ondansetron Injectable 4 milliGRAM(s) IV Push every 6 hours PRN Nausea  tetracaine/benzocaine/butamben Spray 1 Spray(s) Topical every 6 hours PRN Sore throat      OBJECTIVE:    Sedation Score:	[ X] Alert	[ ] Drowsy 	[ ] Arousable	[ ] Asleep	[ ] Unresponsive    Side Effects:	[X ] None	[ ] Nausea	[ ] Vomiting	[ ] Pruritus  		[ ] Other:    Vital Signs Last 24 Hrs  T(C): 36.8 (22 Aug 2022 08:39), Max: 37.2 (22 Aug 2022 06:39)  T(F): 98.2 (22 Aug 2022 08:39), Max: 99 (22 Aug 2022 06:39)  HR: 77 (22 Aug 2022 08:39) (77 - 96)  BP: 143/85 (22 Aug 2022 08:39) (126/68 - 145/72)  BP(mean): --  RR: 18 (22 Aug 2022 08:39) (18 - 18)  SpO2: 97% (22 Aug 2022 08:39) (94% - 97%)    Parameters below as of 22 Aug 2022 08:39  Patient On (Oxygen Delivery Method): room air        ASSESSMENT/ PLAN    Therapy to  be:               [X] Continued   [ ] Discontinued   [ ] Changed to PRN Analgesics    Documentation and Verification of current medications:   [X] Done	[ ] Not done, not eligible    Comments:  Doing well on PCA

## 2022-08-23 LAB
ALBUMIN SERPL ELPH-MCNC: 2.2 G/DL — LOW (ref 3.3–5)
ALP SERPL-CCNC: 55 U/L — SIGNIFICANT CHANGE UP (ref 40–120)
ALT FLD-CCNC: 30 U/L — SIGNIFICANT CHANGE UP (ref 10–45)
ANION GAP SERPL CALC-SCNC: 8 MMOL/L — SIGNIFICANT CHANGE UP (ref 5–17)
ANION GAP SERPL CALC-SCNC: 8 MMOL/L — SIGNIFICANT CHANGE UP (ref 5–17)
AST SERPL-CCNC: 39 U/L — SIGNIFICANT CHANGE UP (ref 10–40)
BILIRUB DIRECT SERPL-MCNC: 0.1 MG/DL — SIGNIFICANT CHANGE UP (ref 0–0.3)
BILIRUB INDIRECT FLD-MCNC: 0.5 MG/DL — SIGNIFICANT CHANGE UP (ref 0.2–1)
BILIRUB SERPL-MCNC: 0.6 MG/DL — SIGNIFICANT CHANGE UP (ref 0.2–1.2)
BUN SERPL-MCNC: 7 MG/DL — SIGNIFICANT CHANGE UP (ref 7–23)
BUN SERPL-MCNC: 7 MG/DL — SIGNIFICANT CHANGE UP (ref 7–23)
CALCIUM SERPL-MCNC: 7.5 MG/DL — LOW (ref 8.4–10.5)
CALCIUM SERPL-MCNC: 7.7 MG/DL — LOW (ref 8.4–10.5)
CHLORIDE SERPL-SCNC: 102 MMOL/L — SIGNIFICANT CHANGE UP (ref 96–108)
CHLORIDE SERPL-SCNC: 99 MMOL/L — SIGNIFICANT CHANGE UP (ref 96–108)
CO2 SERPL-SCNC: 23 MMOL/L — SIGNIFICANT CHANGE UP (ref 22–31)
CO2 SERPL-SCNC: 25 MMOL/L — SIGNIFICANT CHANGE UP (ref 22–31)
CREAT SERPL-MCNC: 0.65 MG/DL — SIGNIFICANT CHANGE UP (ref 0.5–1.3)
CREAT SERPL-MCNC: 0.68 MG/DL — SIGNIFICANT CHANGE UP (ref 0.5–1.3)
EGFR: 101 ML/MIN/1.73M2 — SIGNIFICANT CHANGE UP
EGFR: 102 ML/MIN/1.73M2 — SIGNIFICANT CHANGE UP
GLUCOSE SERPL-MCNC: 109 MG/DL — HIGH (ref 70–99)
GLUCOSE SERPL-MCNC: 145 MG/DL — HIGH (ref 70–99)
HCT VFR BLD CALC: 36.3 % — LOW (ref 39–50)
HGB BLD-MCNC: 11.8 G/DL — LOW (ref 13–17)
MAGNESIUM SERPL-MCNC: 1.9 MG/DL — SIGNIFICANT CHANGE UP (ref 1.6–2.6)
MCHC RBC-ENTMCNC: 29.6 PG — SIGNIFICANT CHANGE UP (ref 27–34)
MCHC RBC-ENTMCNC: 32.5 GM/DL — SIGNIFICANT CHANGE UP (ref 32–36)
MCV RBC AUTO: 91.2 FL — SIGNIFICANT CHANGE UP (ref 80–100)
NRBC # BLD: 0 /100 WBCS — SIGNIFICANT CHANGE UP (ref 0–0)
PHOSPHATE SERPL-MCNC: 1.7 MG/DL — LOW (ref 2.5–4.5)
PHOSPHATE SERPL-MCNC: 1.7 MG/DL — LOW (ref 2.5–4.5)
PHOSPHATE SERPL-MCNC: 1.9 MG/DL — LOW (ref 2.5–4.5)
PLATELET # BLD AUTO: 165 K/UL — SIGNIFICANT CHANGE UP (ref 150–400)
POTASSIUM SERPL-MCNC: 3.8 MMOL/L — SIGNIFICANT CHANGE UP (ref 3.5–5.3)
POTASSIUM SERPL-MCNC: 4.4 MMOL/L — SIGNIFICANT CHANGE UP (ref 3.5–5.3)
POTASSIUM SERPL-SCNC: 3.8 MMOL/L — SIGNIFICANT CHANGE UP (ref 3.5–5.3)
POTASSIUM SERPL-SCNC: 4.4 MMOL/L — SIGNIFICANT CHANGE UP (ref 3.5–5.3)
PROT SERPL-MCNC: 5.6 G/DL — LOW (ref 6–8.3)
RBC # BLD: 3.98 M/UL — LOW (ref 4.2–5.8)
RBC # FLD: 12.7 % — SIGNIFICANT CHANGE UP (ref 10.3–14.5)
SODIUM SERPL-SCNC: 132 MMOL/L — LOW (ref 135–145)
SODIUM SERPL-SCNC: 133 MMOL/L — LOW (ref 135–145)
WBC # BLD: 9.62 K/UL — SIGNIFICANT CHANGE UP (ref 3.8–10.5)
WBC # FLD AUTO: 9.62 K/UL — SIGNIFICANT CHANGE UP (ref 3.8–10.5)

## 2022-08-23 RX ORDER — PANTOPRAZOLE SODIUM 20 MG/1
40 TABLET, DELAYED RELEASE ORAL
Refills: 0 | Status: DISCONTINUED | OUTPATIENT
Start: 2022-08-23 | End: 2022-09-07

## 2022-08-23 RX ORDER — CEFAZOLIN SODIUM 1 G
2000 VIAL (EA) INJECTION EVERY 8 HOURS
Refills: 0 | Status: DISCONTINUED | OUTPATIENT
Start: 2022-08-23 | End: 2022-08-23

## 2022-08-23 RX ADMIN — Medication 255 MILLIMOLE(S): at 09:02

## 2022-08-23 RX ADMIN — HYDROMORPHONE HYDROCHLORIDE 30 MILLILITER(S): 2 INJECTION INTRAMUSCULAR; INTRAVENOUS; SUBCUTANEOUS at 19:08

## 2022-08-23 RX ADMIN — PIPERACILLIN AND TAZOBACTAM 25 GRAM(S): 4; .5 INJECTION, POWDER, LYOPHILIZED, FOR SOLUTION INTRAVENOUS at 18:45

## 2022-08-23 RX ADMIN — ENOXAPARIN SODIUM 40 MILLIGRAM(S): 100 INJECTION SUBCUTANEOUS at 18:45

## 2022-08-23 RX ADMIN — HYDROMORPHONE HYDROCHLORIDE 30 MILLILITER(S): 2 INJECTION INTRAMUSCULAR; INTRAVENOUS; SUBCUTANEOUS at 07:26

## 2022-08-23 RX ADMIN — CHLORHEXIDINE GLUCONATE 1 APPLICATION(S): 213 SOLUTION TOPICAL at 08:44

## 2022-08-23 RX ADMIN — PANTOPRAZOLE SODIUM 40 MILLIGRAM(S): 20 TABLET, DELAYED RELEASE ORAL at 06:10

## 2022-08-23 RX ADMIN — PIPERACILLIN AND TAZOBACTAM 25 GRAM(S): 4; .5 INJECTION, POWDER, LYOPHILIZED, FOR SOLUTION INTRAVENOUS at 08:43

## 2022-08-23 RX ADMIN — HYDROMORPHONE HYDROCHLORIDE 30 MILLILITER(S): 2 INJECTION INTRAMUSCULAR; INTRAVENOUS; SUBCUTANEOUS at 09:12

## 2022-08-23 RX ADMIN — Medication 255 MILLIMOLE(S): at 22:13

## 2022-08-23 RX ADMIN — PANTOPRAZOLE SODIUM 40 MILLIGRAM(S): 20 TABLET, DELAYED RELEASE ORAL at 18:45

## 2022-08-23 RX ADMIN — DEXTROSE MONOHYDRATE, SODIUM CHLORIDE, AND POTASSIUM CHLORIDE 125 MILLILITER(S): 50; .745; 4.5 INJECTION, SOLUTION INTRAVENOUS at 22:13

## 2022-08-23 NOTE — PROGRESS NOTE ADULT - SUBJECTIVE AND OBJECTIVE BOX
Day 4 of Anesthesia Pain Management Service    SUBJECTIVE: I'm doing ok    Pain Scale Score:	[X] Refer to charted pain scores    THERAPY:    [ ] IV PCA Morphine		[ ] 5 mg/mL	[ ] 1 mg/mL  [X] IV PCA Hydromorphone	[ ] 5 mg/mL	[X] 1 mg/mL  [ ] IV PCA Fentanyl		[ ] 50 micrograms/mL    Demand dose: 0.2 mg     Lockout: 6 minutes   Continuous Rate: 0 mg/hr  4 Hour Limit: 4 mg    MEDICATIONS  (STANDING):  chlorhexidine 2% Cloths 1 Application(s) Topical daily  dextrose 5% + sodium chloride 0.45% with potassium chloride 20 mEq/L 1000 milliLiter(s) (125 mL/Hr) IV Continuous <Continuous>  enoxaparin Injectable 40 milliGRAM(s) SubCutaneous every 24 hours  HYDROmorphone PCA (1 mG/mL) 30 milliLiter(s) PCA Continuous PCA Continuous  pantoprazole  Injectable 40 milliGRAM(s) IV Push two times a day  piperacillin/tazobactam IVPB.. 3.375 Gram(s) IV Intermittent <User Schedule>    MEDICATIONS  (PRN):  HYDROmorphone PCA (1 mG/mL) Rescue Clinician Bolus 0.5 milliGRAM(s) IV Push every 15 minutes PRN for Pain Scale GREATER THAN 6  naloxone Injectable 0.1 milliGRAM(s) IV Push every 3 minutes PRN For ANY of the following changes in patient status:  A. RR LESS THAN 10 breaths per minute, B. Oxygen saturation LESS THAN 90%, C. Sedation score of 6  ondansetron Injectable 4 milliGRAM(s) IV Push every 6 hours PRN Nausea  tetracaine/benzocaine/butamben Spray 1 Spray(s) Topical every 6 hours PRN Sore throat      OBJECTIVE:    Sedation Score:	[ X] Alert 	[ ] Drowsy 	[ ] Arousable	[ ] Asleep	[ ] Unresponsive    Side Effects:	[X ] None	[ ] Nausea	[ ] Vomiting	[ ] Pruritus  		[ ] Other:    Vital Signs Last 24 Hrs  T(C): 37.8 (23 Aug 2022 05:10), Max: 37.8 (23 Aug 2022 05:10)  T(F): 100 (23 Aug 2022 05:10), Max: 100 (23 Aug 2022 05:10)  HR: 69 (23 Aug 2022 05:10) (69 - 86)  BP: 149/88 (23 Aug 2022 05:10) (146/76 - 166/83)  BP(mean): --  RR: 19 (23 Aug 2022 05:10) (18 - 19)  SpO2: 96% (23 Aug 2022 05:10) (95% - 98%)    Parameters below as of 23 Aug 2022 05:10  Patient On (Oxygen Delivery Method): room air        ASSESSMENT/ PLAN    Therapy to  be:               [X] Continued   [ ] Discontinued   [ ] Changed to PRN Analgesics    Documentation and Verification of current medications:   [X] Done	[ ] Not done, not eligible    Comments: Pain controlled with PCA. Continue

## 2022-08-23 NOTE — PROGRESS NOTE ADULT - ASSESSMENT
Assessment:  67 y Male patient S/P exploratory laparotomy for pneumoperitoneum 2/2 duodenal perforation.    Plan:  - 3 staples removed from midline incision with packing, trend CBC for SSI  - Day 4 zosyn, added ancef   - Pain control (Dilaudid PCA)  - Patient on protonix 40 mg IV BID for bloody NGT output o/n  - UGI on POD 6 prior to removal of NGT and feeding  - S/p IV phosphate repletion (1.6 ->1.7), continue to trend response to infusion  - Diet: NPO on maintenance fluids  - Activity: PT/OP eval pending  - DVT ppx       Assessment:  67 y Male patient S/P exploratory laparotomy for pneumoperitoneum 2/2 duodenal perforation.    Plan:  - 3 staples removed from midline incision with packing, trend CBC for SSI  - Day 4 zosyn, added ancef   - Pain control (Dilaudid PCA)  - Patient on protonix 40 mg IV BID for bloody NGT output o/n  - UGI on POD 6 prior to removal of NGT and feeding  - S/p IV phosphate repletion (1.6 ->1.7), continue to trend response to infusion  - Diet: NPO on maintenance fluids  - Activity: PT/OP eval pending  - DVT ppx    ACS  [id number]

## 2022-08-23 NOTE — PROGRESS NOTE ADULT - SUBJECTIVE AND OBJECTIVE BOX
POD #4 ex lap for perf duodenum using omental patch for clusre    24hr events:  - blood in NGT; started IV Protonix  - Erythema around staples; drained minimal pus and removed 3 staples from midline incision    Overnight events:   - No acute events    SUBJECTIVE:      OBJECTIVE:  Vital Signs Last 24 Hrs  T(C): 37.8 (23 Aug 2022 05:10), Max: 37.8 (23 Aug 2022 05:10)  T(F): 100 (23 Aug 2022 05:10), Max: 100 (23 Aug 2022 05:10)  HR: 69 (23 Aug 2022 05:10) (69 - 86)  BP: 149/88 (23 Aug 2022 05:10) (143/85 - 166/83)  BP(mean): --  RR: 19 (23 Aug 2022 05:10) (18 - 19)  SpO2: 96% (23 Aug 2022 05:10) (95% - 98%)    Parameters below as of 23 Aug 2022 05:10  Patient On (Oxygen Delivery Method): room air          08-22-22 @ 07:01  -  08-23-22 @ 07:00  --------------------------------------------------------  IN: 2100 mL / OUT: 2655 mL / NET: -555 mL        Physical Examination:  GEN: NAD, resting quietly  PULM: symmetric chest rise bilaterally, no increased WOB  ABD: soft, appropriately tender, nondistended, no rebound or guarding, incision CDI  EXTR: no LE erythema, moving all extremities      LABS:                        11.8   9.62  )-----------( 165      ( 23 Aug 2022 01:24 )             36.3       08-23    133<L>  |  102  |  7   ----------------------------<  145<H>  3.8   |  23  |  0.68    Ca    7.5<L>      23 Aug 2022 01:24  Phos  1.7     08-23  Mg     1.9     08-23    TPro  5.6<L>  /  Alb  2.2<L>  /  TBili  0.6  /  DBili  0.1  /  AST  39  /  ALT  30  /  AlkPhos  55  08-23         POD #4 ex lap for perf duodenum using omental patch for clusre    24hr events:  - blood in NGT; started IV Protonix  - Erythema around staples; drained minimal pus and removed 3 staples from midline incision    Overnight events:   - No acute events o/n.    SUBJECTIVE:  - Patient was seen and examined by trauma team this morning. He denies any significant abdominal pain, n/v, but has not had a BM/flatus. Three staples were removed from midline incision for erythema and scant pus, patient denied increased pain in the area. Patient also denies fever, chills, SOB, and CP.          OBJECTIVE:  Vital Signs Last 24 Hrs  T(C): 37.8 (23 Aug 2022 05:10), Max: 37.8 (23 Aug 2022 05:10)  T(F): 100 (23 Aug 2022 05:10), Max: 100 (23 Aug 2022 05:10)  HR: 69 (23 Aug 2022 05:10) (69 - 86)  BP: 149/88 (23 Aug 2022 05:10) (143/85 - 166/83)  BP(mean): --  RR: 19 (23 Aug 2022 05:10) (18 - 19)  SpO2: 96% (23 Aug 2022 05:10) (95% - 98%)    Parameters below as of 23 Aug 2022 05:10  Patient On (Oxygen Delivery Method): room air          08-22-22 @ 07:01  -  08-23-22 @ 07:00  --------------------------------------------------------  IN: 2100 mL / OUT: 2655 mL / NET: -555 mL        Physical Examination:  GEN: NAD, resting quietly  PULM: symmetric chest rise bilaterally, no increased WOB  ABD: soft, appropriately tender, nondistended, no rebound or guarding, midline incision had erythema and scant pus discharge at staple line, DINAH drain CDI putting out serosanguinous drainage.   EXTR: no LE erythema, moving all extremities      LABS:                        11.8   9.62  )-----------( 165      ( 23 Aug 2022 01:24 )             36.3       08-23    133<L>  |  102  |  7   ----------------------------<  145<H>  3.8   |  23  |  0.68    Ca    7.5<L>      23 Aug 2022 01:24  Phos  1.7     08-23  Mg     1.9     08-23    TPro  5.6<L>  /  Alb  2.2<L>  /  TBili  0.6  /  DBili  0.1  /  AST  39  /  ALT  30  /  AlkPhos  55  08-23

## 2022-08-24 LAB
ANION GAP SERPL CALC-SCNC: 10 MMOL/L — SIGNIFICANT CHANGE UP (ref 5–17)
ANION GAP SERPL CALC-SCNC: 7 MMOL/L — SIGNIFICANT CHANGE UP (ref 5–17)
APPEARANCE UR: CLEAR — SIGNIFICANT CHANGE UP
BACTERIA # UR AUTO: 0 — SIGNIFICANT CHANGE UP
BILIRUB UR-MCNC: NEGATIVE — SIGNIFICANT CHANGE UP
BUN SERPL-MCNC: 6 MG/DL — LOW (ref 7–23)
BUN SERPL-MCNC: 6 MG/DL — LOW (ref 7–23)
CALCIUM SERPL-MCNC: 7.9 MG/DL — LOW (ref 8.4–10.5)
CALCIUM SERPL-MCNC: 7.9 MG/DL — LOW (ref 8.4–10.5)
CHLORIDE SERPL-SCNC: 97 MMOL/L — SIGNIFICANT CHANGE UP (ref 96–108)
CHLORIDE SERPL-SCNC: 98 MMOL/L — SIGNIFICANT CHANGE UP (ref 96–108)
CO2 SERPL-SCNC: 23 MMOL/L — SIGNIFICANT CHANGE UP (ref 22–31)
CO2 SERPL-SCNC: 25 MMOL/L — SIGNIFICANT CHANGE UP (ref 22–31)
COLOR SPEC: SIGNIFICANT CHANGE UP
CREAT SERPL-MCNC: 0.61 MG/DL — SIGNIFICANT CHANGE UP (ref 0.5–1.3)
CREAT SERPL-MCNC: 0.65 MG/DL — SIGNIFICANT CHANGE UP (ref 0.5–1.3)
DIFF PNL FLD: ABNORMAL
EGFR: 102 ML/MIN/1.73M2 — SIGNIFICANT CHANGE UP
EGFR: 104 ML/MIN/1.73M2 — SIGNIFICANT CHANGE UP
EPI CELLS # UR: 1 /HPF — SIGNIFICANT CHANGE UP
GLUCOSE SERPL-MCNC: 142 MG/DL — HIGH (ref 70–99)
GLUCOSE SERPL-MCNC: 152 MG/DL — HIGH (ref 70–99)
GLUCOSE UR QL: ABNORMAL
H PYLORI AB SER-ACNC: <5 UNITS — SIGNIFICANT CHANGE UP
HCT VFR BLD CALC: 36.1 % — LOW (ref 39–50)
HGB BLD-MCNC: 12.1 G/DL — LOW (ref 13–17)
HYALINE CASTS # UR AUTO: 0 /LPF — SIGNIFICANT CHANGE UP (ref 0–2)
KETONES UR-MCNC: NEGATIVE — SIGNIFICANT CHANGE UP
LEUKOCYTE ESTERASE UR-ACNC: NEGATIVE — SIGNIFICANT CHANGE UP
MAGNESIUM SERPL-MCNC: 2 MG/DL — SIGNIFICANT CHANGE UP (ref 1.6–2.6)
MCHC RBC-ENTMCNC: 30.1 PG — SIGNIFICANT CHANGE UP (ref 27–34)
MCHC RBC-ENTMCNC: 33.5 GM/DL — SIGNIFICANT CHANGE UP (ref 32–36)
MCV RBC AUTO: 89.8 FL — SIGNIFICANT CHANGE UP (ref 80–100)
NITRITE UR-MCNC: NEGATIVE — SIGNIFICANT CHANGE UP
NRBC # BLD: 0 /100 WBCS — SIGNIFICANT CHANGE UP (ref 0–0)
PH UR: 7 — SIGNIFICANT CHANGE UP (ref 5–8)
PHOSPHATE SERPL-MCNC: 1.8 MG/DL — LOW (ref 2.5–4.5)
PHOSPHATE SERPL-MCNC: 2.6 MG/DL — SIGNIFICANT CHANGE UP (ref 2.5–4.5)
PLATELET # BLD AUTO: 180 K/UL — SIGNIFICANT CHANGE UP (ref 150–400)
POTASSIUM SERPL-MCNC: 3.6 MMOL/L — SIGNIFICANT CHANGE UP (ref 3.5–5.3)
POTASSIUM SERPL-MCNC: 4.5 MMOL/L — SIGNIFICANT CHANGE UP (ref 3.5–5.3)
POTASSIUM SERPL-SCNC: 3.6 MMOL/L — SIGNIFICANT CHANGE UP (ref 3.5–5.3)
POTASSIUM SERPL-SCNC: 4.5 MMOL/L — SIGNIFICANT CHANGE UP (ref 3.5–5.3)
PROT UR-MCNC: ABNORMAL
RBC # BLD: 4.02 M/UL — LOW (ref 4.2–5.8)
RBC # FLD: 12.9 % — SIGNIFICANT CHANGE UP (ref 10.3–14.5)
RBC CASTS # UR COMP ASSIST: 1 /HPF — SIGNIFICANT CHANGE UP (ref 0–4)
SODIUM SERPL-SCNC: 128 MMOL/L — LOW (ref 135–145)
SODIUM SERPL-SCNC: 132 MMOL/L — LOW (ref 135–145)
SP GR SPEC: 1.01 — SIGNIFICANT CHANGE UP (ref 1.01–1.02)
UROBILINOGEN FLD QL: NEGATIVE — SIGNIFICANT CHANGE UP
WBC # BLD: 11.59 K/UL — HIGH (ref 3.8–10.5)
WBC # FLD AUTO: 11.59 K/UL — HIGH (ref 3.8–10.5)
WBC UR QL: 0 /HPF — SIGNIFICANT CHANGE UP (ref 0–5)

## 2022-08-24 PROCEDURE — 71045 X-RAY EXAM CHEST 1 VIEW: CPT | Mod: 26

## 2022-08-24 RX ADMIN — HYDROMORPHONE HYDROCHLORIDE 30 MILLILITER(S): 2 INJECTION INTRAMUSCULAR; INTRAVENOUS; SUBCUTANEOUS at 19:21

## 2022-08-24 RX ADMIN — Medication 127.5 MILLIMOLE(S): at 11:24

## 2022-08-24 RX ADMIN — PIPERACILLIN AND TAZOBACTAM 25 GRAM(S): 4; .5 INJECTION, POWDER, LYOPHILIZED, FOR SOLUTION INTRAVENOUS at 03:26

## 2022-08-24 RX ADMIN — Medication 127.5 MILLIMOLE(S): at 18:02

## 2022-08-24 RX ADMIN — PANTOPRAZOLE SODIUM 40 MILLIGRAM(S): 20 TABLET, DELAYED RELEASE ORAL at 18:00

## 2022-08-24 RX ADMIN — HYDROMORPHONE HYDROCHLORIDE 30 MILLILITER(S): 2 INJECTION INTRAMUSCULAR; INTRAVENOUS; SUBCUTANEOUS at 07:15

## 2022-08-24 RX ADMIN — Medication 10 MILLIGRAM(S): at 09:48

## 2022-08-24 RX ADMIN — CHLORHEXIDINE GLUCONATE 1 APPLICATION(S): 213 SOLUTION TOPICAL at 11:25

## 2022-08-24 RX ADMIN — PANTOPRAZOLE SODIUM 40 MILLIGRAM(S): 20 TABLET, DELAYED RELEASE ORAL at 05:18

## 2022-08-24 RX ADMIN — ENOXAPARIN SODIUM 40 MILLIGRAM(S): 100 INJECTION SUBCUTANEOUS at 18:56

## 2022-08-24 RX ADMIN — DEXTROSE MONOHYDRATE, SODIUM CHLORIDE, AND POTASSIUM CHLORIDE 125 MILLILITER(S): 50; .745; 4.5 INJECTION, SOLUTION INTRAVENOUS at 09:48

## 2022-08-24 NOTE — DIETITIAN INITIAL EVALUATION ADULT - ENERGY INTAKE
6/Number of days NPO (enter number of days in comment field) Pt NPO since (8/19) x 6 days. Currently with NGT in place for suction. Output x 24hrs: 125ml. Last BM 8/24, on bowel regimen.  Noted with hyponatremia and hypophosphatemia, being repleted.  Discussed typical diet advancement. RD to remain available and follow-up as medically appropriate.

## 2022-08-24 NOTE — DIETITIAN INITIAL EVALUATION ADULT - NSFNSGIIOFT_GEN_A_CORE
08-23-22 @ 07:01  -  08-24-22 @ 07:00  --------------------------------------------------------  OUT:    Emesis (mL): 0 mL    Nasogastric/Oral tube (mL): 125 mL  Total OUT: 125 mL    Total NET: -125 mL      Last Bm 8/24

## 2022-08-24 NOTE — DIETITIAN INITIAL EVALUATION ADULT - PERTINENT LABORATORY DATA
08-24    128<L>  |  98  |  6<L>  ----------------------------<  152<H>  3.6   |  23  |  0.61    Ca    7.9<L>      24 Aug 2022 07:05  Phos  1.8     08-24  Mg     2.0     08-24    TPro  5.6<L>  /  Alb  2.2<L>  /  TBili  0.6  /  DBili  0.1  /  AST  39  /  ALT  30  /  AlkPhos  55  08-23

## 2022-08-24 NOTE — PROGRESS NOTE ADULT - ASSESSMENT
Assessment:  67 y Male patient S/P exploratory laparotomy for pneumoperitoneum 2/2 duodenal perforation.    Plan:  - 3 staples removed yesterday from midline incision was cleaned and repacked this am, WBCs jumped (9.6 -> 11.5) continue to trend    - Pain control (Dilaudid PCA)  - C/w protonix 40 mg IV BID   - UGIS today f/u results   - S/p IV phosphate repletion still not at goal, increased IV phos to 60mmol over 8 hrs then recheck  - Diet: NPO on maintenance fluids  - Activity: PT/OP eval pending  - DVT ppx Assessment:  67 y Male patient S/P exploratory laparotomy for pneumoperitoneum 2/2 duodenal perforation.    Plan:  - 3 staples removed yesterday from midline incision was cleaned and repacked this am, WBCs jumped (9.6 -> 11.5) continue to trend    - Pain control (Dilaudid PCA)  - C/w protonix 40 mg IV BID   - Rectal suppository given (Dulcolax) f/u BM  - UGIS today f/u results   - S/p IV phosphate repletion still not at goal, increased IV phos to 60mmol over 8 hrs then recheck  - Diet: NPO on maintenance fluids  - Activity: PT/OP eval pending  - DVT ppx Assessment:  67 y Male patient S/P exploratory laparotomy for pneumoperitoneum 2/2 duodenal perforation.    Plan:  - midline incision was cleaned and repacked this am, WBCs jumped (9.6 -> 11.5) continue to trend    - Pain control (Dilaudid PCA)  - C/w protonix 40 mg IV BID   - Rectal suppository given (Dulcolax) f/u BM  - UGIS today f/u results   - S/p IV phosphate repletion still not at goal, increased IV phos to 60mmol over 8 hrs then recheck  - Diet: NPO on maintenance fluids  - Activity: PT/OT eval pending  - DVT ppx    ACS  [id number]

## 2022-08-24 NOTE — DIETITIAN INITIAL EVALUATION ADULT - REASON FOR ADMISSION
Nontraumatic perforation of intestine    Chart reviewed, events noted. This is a "67 y Male patient S/P exploratory laparotomy for pneumoperitoneum 2/2 duodenal perforation." Covid +.

## 2022-08-24 NOTE — PROGRESS NOTE ADULT - SUBJECTIVE AND OBJECTIVE BOX
POD #    24hr events:  -     Overnight events:   - No acute events    SUBJECTIVE:      OBJECTIVE:  Vital Signs Last 24 Hrs  T(C): 38.1 (24 Aug 2022 05:55), Max: 38.1 (24 Aug 2022 05:55)  T(F): 100.5 (24 Aug 2022 05:55), Max: 100.5 (24 Aug 2022 05:55)  HR: 80 (24 Aug 2022 05:55) (80 - 89)  BP: 164/87 (24 Aug 2022 05:55) (146/83 - 184/87)  BP(mean): --  RR: 19 (24 Aug 2022 05:55) (18 - 19)  SpO2: 95% (24 Aug 2022 05:55) (95% - 96%)    Parameters below as of 24 Aug 2022 05:55  Patient On (Oxygen Delivery Method): room air          08-23-22 @ 07:01  -  08-24-22 @ 07:00  --------------------------------------------------------  IN: 4800 mL / OUT: 2390 mL / NET: 2410 mL        Physical Examination:  GEN: NAD, resting quietly  PULM: symmetric chest rise bilaterally, no increased WOB  ABD: soft, appropriately tender, nondistended, no rebound or guarding, incision CDI  EXTR: no LE erythema, moving all extremities      LABS:                        12.1   11.59 )-----------( 180      ( 24 Aug 2022 07:05 )             36.1       08-24    128<L>  |  98  |  6<L>  ----------------------------<  152<H>  3.6   |  23  |  0.61    Ca    7.9<L>      24 Aug 2022 07:05  Phos  1.8     08-24  Mg     2.0     08-24    TPro  5.6<L>  /  Alb  2.2<L>  /  TBili  0.6  /  DBili  0.1  /  AST  39  /  ALT  30  /  AlkPhos  55  08-23         POD # 5 ex lap for perf duodenum using omental patch for closure    Overnight events:   - No acute events o/n. Patient was hypertensive (184/87) o/n, had a low grade fever 100.5 and BP in 160s early this am. NGT put out 125 cc o/n.     SUBJECTIVE: Patient was seen and examined by the trauma team this morning and is doing well. He endorses mild incision tenderness but otherwise well controlled with PCA pump. Patient has yet to pass gas or have BM, and was interested in rectal suppository to promote BM. Patient denies fever, chills, CP, SOB, and n/v. (Used  - ID# [id number])      OBJECTIVE:  Vital Signs Last 24 Hrs  T(C): 38.1 (24 Aug 2022 05:55), Max: 38.1 (24 Aug 2022 05:55)  T(F): 100.5 (24 Aug 2022 05:55), Max: 100.5 (24 Aug 2022 05:55)  HR: 80 (24 Aug 2022 05:55) (80 - 89)  BP: 164/87 (24 Aug 2022 05:55) (146/83 - 184/87)  BP(mean): --  RR: 19 (24 Aug 2022 05:55) (18 - 19)  SpO2: 95% (24 Aug 2022 05:55) (95% - 96%)    Parameters below as of 24 Aug 2022 05:55  Patient On (Oxygen Delivery Method): room air          08-23-22 @ 07:01  -  08-24-22 @ 07:00  --------------------------------------------------------  IN: 4800 mL / OUT: 2390 mL / NET: 2410 mL        Physical Examination:  GEN: NAD, resting quietly  PULM: symmetric chest rise bilaterally, no increased WOB  ABD: soft, appropriately tender, nondistended, no rebound or guarding, midline incision had reduced erythema compared to yesterday where staples were removed, and no visible pus at staple line, DINAH drain CDI putting out minimal serosanguinous drainage.   EXTR: no LE erythema, moving all extremities      LABS:                        12.1   11.59 )-----------( 180      ( 24 Aug 2022 07:05 )             36.1       08-24    128<L>  |  98  |  6<L>  ----------------------------<  152<H>  3.6   |  23  |  0.61    Ca    7.9<L>      24 Aug 2022 07:05  Phos  1.8     08-24  Mg     2.0     08-24    TPro  5.6<L>  /  Alb  2.2<L>  /  TBili  0.6  /  DBili  0.1  /  AST  39  /  ALT  30  /  AlkPhos  55  08-23         POD # 5 ex lap for perf duodenum using omental patch for closure    Overnight events:   - No acute events o/n. Patient was hypertensive (184/87) o/n, had a low grade fever 100.5 and BP in 160s early this am. NGT put out 125 cc o/n.     SUBJECTIVE: Patient was seen and examined by the trauma team this morning and is doing well. He endorses mild incision tenderness but otherwise well controlled with PCA pump. Patient has yet to pass gas or have BM, and was interested in rectal suppository to promote BM. Patient denies fever, chills, CP, SOB, and n/v. (Used  - ID# [id number])      OBJECTIVE:  Vital Signs Last 24 Hrs  T(C): 38.1 (24 Aug 2022 05:55), Max: 38.1 (24 Aug 2022 05:55)  T(F): 100.5 (24 Aug 2022 05:55), Max: 100.5 (24 Aug 2022 05:55)  HR: 80 (24 Aug 2022 05:55) (80 - 89)  BP: 164/87 (24 Aug 2022 05:55) (146/83 - 184/87)  BP(mean): --  RR: 19 (24 Aug 2022 05:55) (18 - 19)  SpO2: 95% (24 Aug 2022 05:55) (95% - 96%)    Parameters below as of 24 Aug 2022 05:55  Patient On (Oxygen Delivery Method): room air          08-23-22 @ 07:01  -  08-24-22 @ 07:00  --------------------------------------------------------  IN: 4800 mL / OUT: 2390 mL / NET: 2410 mL        Physical Examination:  GEN: NAD, resting quietly  PULM: symmetric chest rise bilaterally, no increased WOB  ABD: soft, appropriately tender, nondistended, no rebound or guarding, midline incision had reduced erythema compared to yesterday where staples were removed, and no visible pus at staple line, DINAH drain CDI putting out minimal serosanguinous drainage, NGT in place with bilious output.   EXTR: no LE erythema, moving all extremities      LABS:                        12.1   11.59 )-----------( 180      ( 24 Aug 2022 07:05 )             36.1       08-24    128<L>  |  98  |  6<L>  ----------------------------<  152<H>  3.6   |  23  |  0.61    Ca    7.9<L>      24 Aug 2022 07:05  Phos  1.8     08-24  Mg     2.0     08-24    TPro  5.6<L>  /  Alb  2.2<L>  /  TBili  0.6  /  DBili  0.1  /  AST  39  /  ALT  30  /  AlkPhos  55  08-23

## 2022-08-24 NOTE — DIETITIAN INITIAL EVALUATION ADULT - PERTINENT MEDS FT
MEDICATIONS  (STANDING):  chlorhexidine 2% Cloths 1 Application(s) Topical daily  dextrose 5% + sodium chloride 0.45% with potassium chloride 20 mEq/L 1000 milliLiter(s) (125 mL/Hr) IV Continuous <Continuous>  enoxaparin Injectable 40 milliGRAM(s) SubCutaneous every 24 hours  HYDROmorphone PCA (1 mG/mL) 30 milliLiter(s) PCA Continuous PCA Continuous  pantoprazole  Injectable 40 milliGRAM(s) IV Push two times a day  sodium phosphate IVPB 30 milliMole(s) IV Intermittent once    MEDICATIONS  (PRN):  HYDROmorphone PCA (1 mG/mL) Rescue Clinician Bolus 0.5 milliGRAM(s) IV Push every 15 minutes PRN for Pain Scale GREATER THAN 6  naloxone Injectable 0.1 milliGRAM(s) IV Push every 3 minutes PRN For ANY of the following changes in patient status:  A. RR LESS THAN 10 breaths per minute, B. Oxygen saturation LESS THAN 90%, C. Sedation score of 6  ondansetron Injectable 4 milliGRAM(s) IV Push every 6 hours PRN Nausea  tetracaine/benzocaine/butamben Spray 1 Spray(s) Topical every 6 hours PRN Sore throat

## 2022-08-24 NOTE — DIETITIAN INITIAL EVALUATION ADULT - ADD RECOMMEND
1) Medical team to advance diet when medically feasible via tolerated route. Consider advancing to clear liquid with Ensure Clear 2x daily, followed by low fiber + Ensure Enlive 2x daily as tolerated. If NPO status > 7 days, consider alternate means of nutrition if within GOC. 2) Recommend addition of multivitamin and thiamine. 3) Provide diet education as appropriate. 4) Malnutrition alert placed in EMR.

## 2022-08-24 NOTE — DIETITIAN INITIAL EVALUATION ADULT - ORAL INTAKE PTA/DIET HISTORY
Pt Cantonese speaking, used  ID [id number]. Pt reports decreased appetite for the last year or so but notes worsening PO intake PTA due abdominal pain. NKFA. Pt denies chewing/swallowing difficulty, nausea, vomiting, diarrhea, constipation.

## 2022-08-25 LAB
ANION GAP SERPL CALC-SCNC: 10 MMOL/L — SIGNIFICANT CHANGE UP (ref 5–17)
ANION GAP SERPL CALC-SCNC: 10 MMOL/L — SIGNIFICANT CHANGE UP (ref 5–17)
BUN SERPL-MCNC: 6 MG/DL — LOW (ref 7–23)
BUN SERPL-MCNC: 8 MG/DL — SIGNIFICANT CHANGE UP (ref 7–23)
C DIFF GDH STL QL: NEGATIVE — SIGNIFICANT CHANGE UP
C DIFF GDH STL QL: SIGNIFICANT CHANGE UP
CALCIUM SERPL-MCNC: 8 MG/DL — LOW (ref 8.4–10.5)
CALCIUM SERPL-MCNC: 8.5 MG/DL — SIGNIFICANT CHANGE UP (ref 8.4–10.5)
CHLORIDE SERPL-SCNC: 100 MMOL/L — SIGNIFICANT CHANGE UP (ref 96–108)
CHLORIDE SERPL-SCNC: 98 MMOL/L — SIGNIFICANT CHANGE UP (ref 96–108)
CO2 SERPL-SCNC: 21 MMOL/L — LOW (ref 22–31)
CO2 SERPL-SCNC: 22 MMOL/L — SIGNIFICANT CHANGE UP (ref 22–31)
CREAT SERPL-MCNC: 0.63 MG/DL — SIGNIFICANT CHANGE UP (ref 0.5–1.3)
CREAT SERPL-MCNC: 0.66 MG/DL — SIGNIFICANT CHANGE UP (ref 0.5–1.3)
EGFR: 102 ML/MIN/1.73M2 — SIGNIFICANT CHANGE UP
EGFR: 103 ML/MIN/1.73M2 — SIGNIFICANT CHANGE UP
GLUCOSE SERPL-MCNC: 122 MG/DL — HIGH (ref 70–99)
GLUCOSE SERPL-MCNC: 140 MG/DL — HIGH (ref 70–99)
HCT VFR BLD CALC: 35.9 % — LOW (ref 39–50)
HGB BLD-MCNC: 12 G/DL — LOW (ref 13–17)
MAGNESIUM SERPL-MCNC: 2.1 MG/DL — SIGNIFICANT CHANGE UP (ref 1.6–2.6)
MCHC RBC-ENTMCNC: 30.2 PG — SIGNIFICANT CHANGE UP (ref 27–34)
MCHC RBC-ENTMCNC: 33.4 GM/DL — SIGNIFICANT CHANGE UP (ref 32–36)
MCV RBC AUTO: 90.4 FL — SIGNIFICANT CHANGE UP (ref 80–100)
NRBC # BLD: 0 /100 WBCS — SIGNIFICANT CHANGE UP (ref 0–0)
PHOSPHATE SERPL-MCNC: 1.5 MG/DL — LOW (ref 2.5–4.5)
PLATELET # BLD AUTO: 206 K/UL — SIGNIFICANT CHANGE UP (ref 150–400)
POTASSIUM SERPL-MCNC: 3.7 MMOL/L — SIGNIFICANT CHANGE UP (ref 3.5–5.3)
POTASSIUM SERPL-MCNC: 3.7 MMOL/L — SIGNIFICANT CHANGE UP (ref 3.5–5.3)
POTASSIUM SERPL-SCNC: 3.7 MMOL/L — SIGNIFICANT CHANGE UP (ref 3.5–5.3)
POTASSIUM SERPL-SCNC: 3.7 MMOL/L — SIGNIFICANT CHANGE UP (ref 3.5–5.3)
RBC # BLD: 3.97 M/UL — LOW (ref 4.2–5.8)
RBC # FLD: 12.5 % — SIGNIFICANT CHANGE UP (ref 10.3–14.5)
SODIUM SERPL-SCNC: 130 MMOL/L — LOW (ref 135–145)
SODIUM SERPL-SCNC: 131 MMOL/L — LOW (ref 135–145)
WBC # BLD: 10.61 K/UL — HIGH (ref 3.8–10.5)
WBC # FLD AUTO: 10.61 K/UL — HIGH (ref 3.8–10.5)

## 2022-08-25 PROCEDURE — 71045 X-RAY EXAM CHEST 1 VIEW: CPT | Mod: 26

## 2022-08-25 PROCEDURE — 74246 X-RAY XM UPR GI TRC 2CNTRST: CPT | Mod: 26

## 2022-08-25 RX ORDER — DEXTROSE MONOHYDRATE, SODIUM CHLORIDE, AND POTASSIUM CHLORIDE 50; .745; 4.5 G/1000ML; G/1000ML; G/1000ML
1000 INJECTION, SOLUTION INTRAVENOUS
Refills: 0 | Status: DISCONTINUED | OUTPATIENT
Start: 2022-08-25 | End: 2022-08-28

## 2022-08-25 RX ORDER — POTASSIUM CHLORIDE 20 MEQ
10 PACKET (EA) ORAL
Refills: 0 | Status: COMPLETED | OUTPATIENT
Start: 2022-08-25 | End: 2022-08-25

## 2022-08-25 RX ADMIN — Medication 100 MILLIEQUIVALENT(S): at 10:01

## 2022-08-25 RX ADMIN — Medication 85 MILLIMOLE(S): at 08:40

## 2022-08-25 RX ADMIN — HYDROMORPHONE HYDROCHLORIDE 30 MILLILITER(S): 2 INJECTION INTRAMUSCULAR; INTRAVENOUS; SUBCUTANEOUS at 07:30

## 2022-08-25 RX ADMIN — DEXTROSE MONOHYDRATE, SODIUM CHLORIDE, AND POTASSIUM CHLORIDE 125 MILLILITER(S): 50; .745; 4.5 INJECTION, SOLUTION INTRAVENOUS at 08:44

## 2022-08-25 RX ADMIN — PANTOPRAZOLE SODIUM 40 MILLIGRAM(S): 20 TABLET, DELAYED RELEASE ORAL at 17:42

## 2022-08-25 RX ADMIN — Medication 127.5 MILLIMOLE(S): at 16:21

## 2022-08-25 RX ADMIN — HYDROMORPHONE HYDROCHLORIDE 30 MILLILITER(S): 2 INJECTION INTRAMUSCULAR; INTRAVENOUS; SUBCUTANEOUS at 19:17

## 2022-08-25 RX ADMIN — Medication 127.5 MILLIMOLE(S): at 05:54

## 2022-08-25 RX ADMIN — Medication 100 MILLIEQUIVALENT(S): at 08:39

## 2022-08-25 RX ADMIN — PANTOPRAZOLE SODIUM 40 MILLIGRAM(S): 20 TABLET, DELAYED RELEASE ORAL at 05:13

## 2022-08-25 RX ADMIN — ENOXAPARIN SODIUM 40 MILLIGRAM(S): 100 INJECTION SUBCUTANEOUS at 18:24

## 2022-08-25 RX ADMIN — CHLORHEXIDINE GLUCONATE 1 APPLICATION(S): 213 SOLUTION TOPICAL at 12:15

## 2022-08-25 NOTE — PROGRESS NOTE ADULT - ASSESSMENT
Assessment:  67 y Male patient S/P exploratory laparotomy for pneumoperitoneum 2/2 duodenal perforation.    Plan:  - midline incision was cleaned and repacked this am, WBCs jumped (9.6 -> 11.5) continue to trend    - Pain control (Dilaudid PCA)  - C/w protonix 40 mg IV BID   - Rectal suppository given (Dulcolax) f/u BM  - UGIS today f/u results   - S/p IV phosphate repletion still not at goal, increased IV phos to 60mmol over 8 hrs then recheck  - Diet: NPO on maintenance fluids  - Activity: PT/OT eval pending  - DVT ppx    ACS  [id number] Assessment:  67 y Male patient S/P exploratory laparotomy for pneumoperitoneum 2/2 duodenal perforation.    Plan:  - midline incision was cleaned and repacked this am, WBCs jumped (9.6 -> 11.5) continue to trend    - Pain control (Dilaudid PCA)  - C/w protonix 40 mg IV BID   - Rectal suppository given (Dulcolax) f/u BM  - UGIS today f/u results   - S/p IV phosphate repletion still not at goal, increased IV phos to 60mmol over 8 hrs then recheck  - Diet: NPO on maintenance fluids  - Activity: PT/OT eval pending  - DVT ppx  - check stool for C. diff  - chest X-ray     ACS  [id number]

## 2022-08-25 NOTE — PROGRESS NOTE ADULT - SUBJECTIVE AND OBJECTIVE BOX
Anesthesia Pain Management Service    SUBJECTIVE: Patient is doing well with IV PCA and no significant problems reported.    Pain Scale Score	At rest: ___ 	With Activity: ___ 	[X ] Refer to charted pain scores    THERAPY:    [ ] IV PCA Morphine		[ ] 5 mg/mL	[ ] 1 mg/mL  [X ] IV PCA Hydromorphone	[ ] 5 mg/mL	[X ] 1 mg/mL  [ ] IV PCA Fentanyl		[ ] 50 micrograms/mL    Demand dose __0.2_ lockout __6_ (minutes) Continuous Rate _0__ Total: ___  mg used (in past 24 hours)      MEDICATIONS  (STANDING):  chlorhexidine 2% Cloths 1 Application(s) Topical daily  dextrose 5% + sodium chloride 0.9% with potassium chloride 20 mEq/L 1000 milliLiter(s) (125 mL/Hr) IV Continuous <Continuous>  enoxaparin Injectable 40 milliGRAM(s) SubCutaneous every 24 hours  guaiFENesin  milliGRAM(s) Oral once  HYDROmorphone PCA (1 mG/mL) 30 milliLiter(s) PCA Continuous PCA Continuous  pantoprazole  Injectable 40 milliGRAM(s) IV Push two times a day  sodium phosphate IVPB 30 milliMole(s) IV Intermittent once    MEDICATIONS  (PRN):  HYDROmorphone PCA (1 mG/mL) Rescue Clinician Bolus 0.5 milliGRAM(s) IV Push every 15 minutes PRN for Pain Scale GREATER THAN 6  naloxone Injectable 0.1 milliGRAM(s) IV Push every 3 minutes PRN For ANY of the following changes in patient status:  A. RR LESS THAN 10 breaths per minute, B. Oxygen saturation LESS THAN 90%, C. Sedation score of 6  ondansetron Injectable 4 milliGRAM(s) IV Push every 6 hours PRN Nausea  tetracaine/benzocaine/butamben Spray 1 Spray(s) Topical every 6 hours PRN Sore throat      OBJECTIVE:    Sedation Score:	[ X] Alert	[ ] Drowsy 	[ ] Arousable	[ ] Asleep	[ ] Unresponsive    Side Effects:	[X ] None	[ ] Nausea	[ ] Vomiting	[ ] Pruritus  		[ ] Other:    Vital Signs Last 24 Hrs  T(C): 37.3 (25 Aug 2022 09:34), Max: 37.6 (24 Aug 2022 20:29)  T(F): 99.1 (25 Aug 2022 09:34), Max: 99.6 (24 Aug 2022 20:29)  HR: 79 (25 Aug 2022 09:34) (79 - 90)  BP: 146/79 (25 Aug 2022 09:34) (142/79 - 153/88)  BP(mean): --  RR: 18 (25 Aug 2022 09:34) (18 - 18)  SpO2: 97% (25 Aug 2022 09:34) (95% - 97%)    Parameters below as of 25 Aug 2022 09:34  Patient On (Oxygen Delivery Method): room air        ASSESSMENT/ PLAN    Therapy to  be:	[ X] Continue   [ ] Discontinued   [ ] Change to prn Analgesics    Documentation and Verification of current medications:   [X] Done	[ ] Not done, not elligible    Comments:    Progress Note written now but Patient was seen earlier.

## 2022-08-25 NOTE — PROGRESS NOTE ADULT - SUBJECTIVE AND OBJECTIVE BOX
POD # 5 ex lap for perf duodenum using omental patch for closure    Overnight events:   - Phos repletion completed, repeat Phos = 2.6 from 1.8     SUBJECTIVE:      OBJECTIVE:  Vital Signs Last 24 Hrs  T(C): 38.1 (24 Aug 2022 05:55), Max: 38.1 (24 Aug 2022 05:55)  T(F): 100.5 (24 Aug 2022 05:55), Max: 100.5 (24 Aug 2022 05:55)  HR: 80 (24 Aug 2022 05:55) (80 - 89)  BP: 164/87 (24 Aug 2022 05:55) (146/83 - 184/87)  BP(mean): --  RR: 19 (24 Aug 2022 05:55) (18 - 19)  SpO2: 95% (24 Aug 2022 05:55) (95% - 96%)    Parameters below as of 24 Aug 2022 05:55  Patient On (Oxygen Delivery Method): room air          08-23-22 @ 07:01  -  08-24-22 @ 07:00  --------------------------------------------------------  IN: 4800 mL / OUT: 2390 mL / NET: 2410 mL        Physical Examination:  GEN: NAD, resting quietly  PULM: symmetric chest rise bilaterally, no increased WOB  ABD: soft, appropriately tender, nondistended, no rebound or guarding, midline incision had reduced erythema compared to yesterday where staples were removed, and no visible pus at staple line, DINAH drain CDI putting out minimal serosanguinous drainage, NGT in place with bilious output.   EXTR: no LE erythema, moving all extremities      LABS:                        12.1   11.59 )-----------( 180      ( 24 Aug 2022 07:05 )             36.1       08-24    128<L>  |  98  |  6<L>  ----------------------------<  152<H>  3.6   |  23  |  0.61    Ca    7.9<L>      24 Aug 2022 07:05  Phos  1.8     08-24  Mg     2.0     08-24    TPro  5.6<L>  /  Alb  2.2<L>  /  TBili  0.6  /  DBili  0.1  /  AST  39  /  ALT  30  /  AlkPhos  55  08-23         POD # 5 ex lap for perf duodenum using omental patch for closure    Overnight events:   - Phos repletion completed, repeat Phos = 2.6 from 1.8.     SUBJECTIVE:  Patient seen and examined. Having loose stool.    OBJECTIVE:  Vital Signs Last 24 Hrs  T(C): 38.1 (24 Aug 2022 05:55), Max: 38.1 (24 Aug 2022 05:55)  T(F): 100.5 (24 Aug 2022 05:55), Max: 100.5 (24 Aug 2022 05:55)  HR: 80 (24 Aug 2022 05:55) (80 - 89)  BP: 164/87 (24 Aug 2022 05:55) (146/83 - 184/87)  BP(mean): --  RR: 19 (24 Aug 2022 05:55) (18 - 19)  SpO2: 95% (24 Aug 2022 05:55) (95% - 96%)    Parameters below as of 24 Aug 2022 05:55  Patient On (Oxygen Delivery Method): room air          08-23-22 @ 07:01  -  08-24-22 @ 07:00  --------------------------------------------------------  IN: 4800 mL / OUT: 2390 mL / NET: 2410 mL        Physical Examination:  GEN: NAD, resting quietly  PULM: symmetric chest rise bilaterally, no increased WOB  ABD: soft, appropriately tender, nondistended, no rebound or guarding, midline incision had reduced erythema compared to yesterday where staples were removed, and no visible pus at staple line, DINAH drain CDI putting out minimal serosanguinous drainage, NGT in place with bilious output.   EXTR: no LE erythema, moving all extremities      LABS:                        12.1   11.59 )-----------( 180      ( 24 Aug 2022 07:05 )             36.1       08-24    128<L>  |  98  |  6<L>  ----------------------------<  152<H>  3.6   |  23  |  0.61    Ca    7.9<L>      24 Aug 2022 07:05  Phos  1.8     08-24  Mg     2.0     08-24    TPro  5.6<L>  /  Alb  2.2<L>  /  TBili  0.6  /  DBili  0.1  /  AST  39  /  ALT  30  /  AlkPhos  55  08-23

## 2022-08-26 LAB
ANION GAP SERPL CALC-SCNC: 10 MMOL/L — SIGNIFICANT CHANGE UP (ref 5–17)
BUN SERPL-MCNC: 10 MG/DL — SIGNIFICANT CHANGE UP (ref 7–23)
CALCIUM SERPL-MCNC: 8 MG/DL — LOW (ref 8.4–10.5)
CHLORIDE SERPL-SCNC: 103 MMOL/L — SIGNIFICANT CHANGE UP (ref 96–108)
CO2 SERPL-SCNC: 20 MMOL/L — LOW (ref 22–31)
CREAT SERPL-MCNC: 0.67 MG/DL — SIGNIFICANT CHANGE UP (ref 0.5–1.3)
EGFR: 101 ML/MIN/1.73M2 — SIGNIFICANT CHANGE UP
GI PCR PANEL: SIGNIFICANT CHANGE UP
GLUCOSE SERPL-MCNC: 134 MG/DL — HIGH (ref 70–99)
HCT VFR BLD CALC: 37.8 % — LOW (ref 39–50)
HCT VFR BLD CALC: 38.1 % — LOW (ref 39–50)
HGB BLD-MCNC: 12.6 G/DL — LOW (ref 13–17)
HGB BLD-MCNC: 12.7 G/DL — LOW (ref 13–17)
MAGNESIUM SERPL-MCNC: 2.3 MG/DL — SIGNIFICANT CHANGE UP (ref 1.6–2.6)
MCHC RBC-ENTMCNC: 29.9 PG — SIGNIFICANT CHANGE UP (ref 27–34)
MCHC RBC-ENTMCNC: 30.1 PG — SIGNIFICANT CHANGE UP (ref 27–34)
MCHC RBC-ENTMCNC: 33.1 GM/DL — SIGNIFICANT CHANGE UP (ref 32–36)
MCHC RBC-ENTMCNC: 33.6 GM/DL — SIGNIFICANT CHANGE UP (ref 32–36)
MCV RBC AUTO: 89.6 FL — SIGNIFICANT CHANGE UP (ref 80–100)
MCV RBC AUTO: 90.3 FL — SIGNIFICANT CHANGE UP (ref 80–100)
NRBC # BLD: 0 /100 WBCS — SIGNIFICANT CHANGE UP (ref 0–0)
NRBC # BLD: 0 /100 WBCS — SIGNIFICANT CHANGE UP (ref 0–0)
PHOSPHATE SERPL-MCNC: 2.1 MG/DL — LOW (ref 2.5–4.5)
PHOSPHATE SERPL-MCNC: 2.5 MG/DL — SIGNIFICANT CHANGE UP (ref 2.5–4.5)
PLATELET # BLD AUTO: 285 K/UL — SIGNIFICANT CHANGE UP (ref 150–400)
PLATELET # BLD AUTO: 304 K/UL — SIGNIFICANT CHANGE UP (ref 150–400)
POTASSIUM SERPL-MCNC: 3.6 MMOL/L — SIGNIFICANT CHANGE UP (ref 3.5–5.3)
POTASSIUM SERPL-SCNC: 3.6 MMOL/L — SIGNIFICANT CHANGE UP (ref 3.5–5.3)
RBC # BLD: 4.22 M/UL — SIGNIFICANT CHANGE UP (ref 4.2–5.8)
RBC # BLD: 4.22 M/UL — SIGNIFICANT CHANGE UP (ref 4.2–5.8)
RBC # FLD: 12.5 % — SIGNIFICANT CHANGE UP (ref 10.3–14.5)
RBC # FLD: 12.7 % — SIGNIFICANT CHANGE UP (ref 10.3–14.5)
SODIUM SERPL-SCNC: 133 MMOL/L — LOW (ref 135–145)
WBC # BLD: 13.25 K/UL — HIGH (ref 3.8–10.5)
WBC # BLD: 14.15 K/UL — HIGH (ref 3.8–10.5)
WBC # FLD AUTO: 13.25 K/UL — HIGH (ref 3.8–10.5)
WBC # FLD AUTO: 14.15 K/UL — HIGH (ref 3.8–10.5)

## 2022-08-26 RX ORDER — OXYCODONE HYDROCHLORIDE 5 MG/1
5 TABLET ORAL EVERY 4 HOURS
Refills: 0 | Status: DISCONTINUED | OUTPATIENT
Start: 2022-08-26 | End: 2022-08-26

## 2022-08-26 RX ORDER — SODIUM CHLORIDE 9 MG/ML
1000 INJECTION, SOLUTION INTRAVENOUS ONCE
Refills: 0 | Status: COMPLETED | OUTPATIENT
Start: 2022-08-26 | End: 2022-08-26

## 2022-08-26 RX ORDER — OXYCODONE HYDROCHLORIDE 5 MG/1
10 TABLET ORAL EVERY 4 HOURS
Refills: 0 | Status: DISCONTINUED | OUTPATIENT
Start: 2022-08-26 | End: 2022-08-26

## 2022-08-26 RX ORDER — POTASSIUM CHLORIDE 20 MEQ
40 PACKET (EA) ORAL ONCE
Refills: 0 | Status: COMPLETED | OUTPATIENT
Start: 2022-08-26 | End: 2022-08-26

## 2022-08-26 RX ORDER — ACETAMINOPHEN 500 MG
650 TABLET ORAL EVERY 6 HOURS
Refills: 0 | Status: DISCONTINUED | OUTPATIENT
Start: 2022-08-26 | End: 2022-08-30

## 2022-08-26 RX ADMIN — SODIUM CHLORIDE 1000 MILLILITER(S): 9 INJECTION, SOLUTION INTRAVENOUS at 09:14

## 2022-08-26 RX ADMIN — Medication 40 MILLIEQUIVALENT(S): at 09:13

## 2022-08-26 RX ADMIN — CHLORHEXIDINE GLUCONATE 1 APPLICATION(S): 213 SOLUTION TOPICAL at 12:41

## 2022-08-26 RX ADMIN — HYDROMORPHONE HYDROCHLORIDE 30 MILLILITER(S): 2 INJECTION INTRAMUSCULAR; INTRAVENOUS; SUBCUTANEOUS at 07:17

## 2022-08-26 RX ADMIN — DEXTROSE MONOHYDRATE, SODIUM CHLORIDE, AND POTASSIUM CHLORIDE 125 MILLILITER(S): 50; .745; 4.5 INJECTION, SOLUTION INTRAVENOUS at 05:07

## 2022-08-26 RX ADMIN — PANTOPRAZOLE SODIUM 40 MILLIGRAM(S): 20 TABLET, DELAYED RELEASE ORAL at 17:57

## 2022-08-26 RX ADMIN — Medication 85 MILLIMOLE(S): at 09:13

## 2022-08-26 RX ADMIN — PANTOPRAZOLE SODIUM 40 MILLIGRAM(S): 20 TABLET, DELAYED RELEASE ORAL at 05:07

## 2022-08-26 RX ADMIN — ENOXAPARIN SODIUM 40 MILLIGRAM(S): 100 INJECTION SUBCUTANEOUS at 18:22

## 2022-08-26 NOTE — PROGRESS NOTE ADULT - ASSESSMENT
Assessment:  67 y Male patient S/P exploratory laparotomy for pneumoperitoneum 2/2 duodenal perforation.    Plan:  - midline incision was cleaned and repacked this am, WBCs jumped (9.6 -> 11.5) continue to trend    - Pain control (Dilaudid PCA)  - C/w protonix 40 mg IV BID   - Rectal suppository given (Dulcolax) f/u BM  - UGIS today f/u results   - S/p IV phosphate repletion still not at goal, increased IV phos to 60mmol over 8 hrs then recheck  - Diet: NPO on maintenance fluids  - Activity: PT/OT eval pending  - DVT ppx  - check stool for C. diff  - chest X-ray     ACS  [id number] Assessment:  67 y Male patient S/P exploratory laparotomy for pneumoperitoneum 2/2 duodenal perforation.    Plan:  - midline incision was cleaned and repacked this am    - Pain control meds switched to PO (Tylenol/Oxy)  - C/w protonix 40 mg IV BID   - Patient reporting decreased urine output likely 2/2 multiple loose stools. Given 1L LR and will continue to monitor output  - UGIS negative for leak/obstruction    - Continue to trend phos (2.5 -> 2.1)  - Clear liquid diet  - Activity: PT/OT eval pending  - DVT ppx       ACS  [id number]

## 2022-08-26 NOTE — PROGRESS NOTE ADULT - SUBJECTIVE AND OBJECTIVE BOX
POD # 5 ex lap for perf duodenum using omental patch for closure    Overnight events:   - Phos repletion completed, repeat Phos = 2.6 from 1.8.     SUBJECTIVE:  Patient seen and examined. Having loose stool.    OBJECTIVE:  Vital Signs Last 24 Hrs  T(C): 38.1 (24 Aug 2022 05:55), Max: 38.1 (24 Aug 2022 05:55)  T(F): 100.5 (24 Aug 2022 05:55), Max: 100.5 (24 Aug 2022 05:55)  HR: 80 (24 Aug 2022 05:55) (80 - 89)  BP: 164/87 (24 Aug 2022 05:55) (146/83 - 184/87)  BP(mean): --  RR: 19 (24 Aug 2022 05:55) (18 - 19)  SpO2: 95% (24 Aug 2022 05:55) (95% - 96%)    Parameters below as of 24 Aug 2022 05:55  Patient On (Oxygen Delivery Method): room air          08-23-22 @ 07:01  -  08-24-22 @ 07:00  --------------------------------------------------------  IN: 4800 mL / OUT: 2390 mL / NET: 2410 mL        Physical Examination:  GEN: NAD, resting quietly  PULM: symmetric chest rise bilaterally, no increased WOB  ABD: soft, appropriately tender, nondistended, no rebound or guarding, midline incision had reduced erythema compared to yesterday where staples were removed, and no visible pus at staple line, DINAH drain CDI putting out minimal serosanguinous drainage, NGT in place with bilious output.   EXTR: no LE erythema, moving all extremities      LABS:                        12.1   11.59 )-----------( 180      ( 24 Aug 2022 07:05 )             36.1       08-24    128<L>  |  98  |  6<L>  ----------------------------<  152<H>  3.6   |  23  |  0.61    Ca    7.9<L>      24 Aug 2022 07:05  Phos  1.8     08-24  Mg     2.0     08-24    TPro  5.6<L>  /  Alb  2.2<L>  /  TBili  0.6  /  DBili  0.1  /  AST  39  /  ALT  30  /  AlkPhos  55  08-23         POD # 5 ex lap for perf duodenum using omental patch for closure    Overnight events:   - No acute events overnight    SUBJECTIVE:  Patient seen and examined. Having loose stool.    OBJECTIVE:  Vital Signs Last 24 Hrs  T(C): 38.1 (24 Aug 2022 05:55), Max: 38.1 (24 Aug 2022 05:55)  T(F): 100.5 (24 Aug 2022 05:55), Max: 100.5 (24 Aug 2022 05:55)  HR: 80 (24 Aug 2022 05:55) (80 - 89)  BP: 164/87 (24 Aug 2022 05:55) (146/83 - 184/87)  BP(mean): --  RR: 19 (24 Aug 2022 05:55) (18 - 19)  SpO2: 95% (24 Aug 2022 05:55) (95% - 96%)    Parameters below as of 24 Aug 2022 05:55  Patient On (Oxygen Delivery Method): room air          08-23-22 @ 07:01  -  08-24-22 @ 07:00  --------------------------------------------------------  IN: 4800 mL / OUT: 2390 mL / NET: 2410 mL        Physical Examination:  GEN: NAD, resting quietly  PULM: symmetric chest rise bilaterally, no increased WOB  ABD: soft, appropriately tender, nondistended, no rebound or guarding, midline incision had reduced erythema compared to yesterday where staples were removed, and no visible pus at staple line, DINAH drain CDI putting out minimal serosanguinous drainage, NGT in place with bilious output.   EXTR: no LE erythema, moving all extremities      LABS:                        12.1   11.59 )-----------( 180      ( 24 Aug 2022 07:05 )             36.1       08-24    128<L>  |  98  |  6<L>  ----------------------------<  152<H>  3.6   |  23  |  0.61    Ca    7.9<L>      24 Aug 2022 07:05  Phos  1.8     08-24  Mg     2.0     08-24    TPro  5.6<L>  /  Alb  2.2<L>  /  TBili  0.6  /  DBili  0.1  /  AST  39  /  ALT  30  /  AlkPhos  55  08-23         POD # 7 ex lap for perf duodenum using omental patch for closure    Overnight events:   - No acute events overnight, patient had multiple BMS throughout the night    SUBJECTIVE:  Patient seen and examined by trauma team this am. Having multiple loose stools and patient mentioned he has been urinating less. Patient otherwise reports mild incisional tenderness but well controlled with pain medication. Denies fever, chills, CP, SOB, n/v.     OBJECTIVE:  Vital Signs Last 24 Hrs  T(C): 38.1 (24 Aug 2022 05:55), Max: 38.1 (24 Aug 2022 05:55)  T(F): 100.5 (24 Aug 2022 05:55), Max: 100.5 (24 Aug 2022 05:55)  HR: 80 (24 Aug 2022 05:55) (80 - 89)  BP: 164/87 (24 Aug 2022 05:55) (146/83 - 184/87)  BP(mean): --  RR: 19 (24 Aug 2022 05:55) (18 - 19)  SpO2: 95% (24 Aug 2022 05:55) (95% - 96%)    Parameters below as of 24 Aug 2022 05:55  Patient On (Oxygen Delivery Method): room air          08-23-22 @ 07:01  -  08-24-22 @ 07:00  --------------------------------------------------------  IN: 4800 mL / OUT: 2390 mL / NET: 2410 mL        Physical Examination:  GEN: NAD, resting quietly  PULM: symmetric chest rise bilaterally, no increased WOB  ABD: soft, appropriately tender, nondistended, no rebound or guarding, midline incision had reduced erythema where staples were removed, and no visible pus at staple line, DINAH drain CDI putting out minimal serosanguinous drainage  EXTR: no LE erythema, moving all extremities      LABS:                        12.1   11.59 )-----------( 180      ( 24 Aug 2022 07:05 )             36.1       08-24    128<L>  |  98  |  6<L>  ----------------------------<  152<H>  3.6   |  23  |  0.61    Ca    7.9<L>      24 Aug 2022 07:05  Phos  1.8     08-24  Mg     2.0     08-24    TPro  5.6<L>  /  Alb  2.2<L>  /  TBili  0.6  /  DBili  0.1  /  AST  39  /  ALT  30  /  AlkPhos  55  08-23

## 2022-08-26 NOTE — PROGRESS NOTE ADULT - SUBJECTIVE AND OBJECTIVE BOX
Anesthesia Pain Management Service    SUBJECTIVE: Patient is doing well with IV PCA, minimal use, tolerating PO    Pain Scale Score:	[X] Refer to charted pain scores    THERAPY:    [ ] IV PCA Morphine		[ ] 5 mg/mL	[ ] 1 mg/mL  [X] IV PCA Hydromorphone	[ ] 5 mg/mL	[X] 1 mg/mL  [ ] IV PCA Fentanyl		[ ] 50 micrograms/mL    Demand dose: 0.2 mg     Lockout: 6 minutes   Continuous Rate: 0 mg/hr  4 Hour Limit: 4 mg    MEDICATIONS  (STANDING):  chlorhexidine 2% Cloths 1 Application(s) Topical daily  dextrose 5% + sodium chloride 0.9% with potassium chloride 20 mEq/L 1000 milliLiter(s) (125 mL/Hr) IV Continuous <Continuous>  enoxaparin Injectable 40 milliGRAM(s) SubCutaneous every 24 hours  pantoprazole  Injectable 40 milliGRAM(s) IV Push two times a day    MEDICATIONS  (PRN):  acetaminophen     Tablet .. 650 milliGRAM(s) Oral every 6 hours PRN Mild Pain (1 - 3)  naloxone Injectable 0.1 milliGRAM(s) IV Push every 3 minutes PRN For ANY of the following changes in patient status:  A. RR LESS THAN 10 breaths per minute, B. Oxygen saturation LESS THAN 90%, C. Sedation score of 6  ondansetron Injectable 4 milliGRAM(s) IV Push every 6 hours PRN Nausea  oxyCODONE    IR 5 milliGRAM(s) Oral every 4 hours PRN Moderate Pain (4 - 6)  oxyCODONE    IR 10 milliGRAM(s) Oral every 4 hours PRN Severe Pain (7 - 10)  tetracaine/benzocaine/butamben Spray 1 Spray(s) Topical every 6 hours PRN Sore throat      OBJECTIVE:    Sedation Score:	[ X] Alert	[ ] Drowsy 	[ ] Arousable	[ ] Asleep	[ ] Unresponsive    Side Effects:	[X ] None	[ ] Nausea	[ ] Vomiting	[ ] Pruritus  		[ ] Other:    Vital Signs Last 24 Hrs  T(C): 37.6 (26 Aug 2022 09:29), Max: 37.8 (26 Aug 2022 01:05)  T(F): 99.6 (26 Aug 2022 09:29), Max: 100 (26 Aug 2022 01:05)  HR: 88 (26 Aug 2022 09:29) (78 - 92)  BP: 127/79 (26 Aug 2022 09:29) (124/77 - 148/81)  BP(mean): --  RR: 18 (26 Aug 2022 09:29) (18 - 18)  SpO2: 97% (26 Aug 2022 09:29) (95% - 98%)    Parameters below as of 26 Aug 2022 09:29  Patient On (Oxygen Delivery Method): room air        ASSESSMENT/ PLAN    Therapy to  be:               [ ] Continued   [x ] Discontinued   [x ] Changed to PRN Analgesics    Documentation and Verification of current medications:   [X] Done	[ ] Not done, not eligible    Comments: Minimal use; consider discontinuing PCA, transition to PO Rx.

## 2022-08-27 LAB
ANION GAP SERPL CALC-SCNC: 12 MMOL/L — SIGNIFICANT CHANGE UP (ref 5–17)
BUN SERPL-MCNC: 12 MG/DL — SIGNIFICANT CHANGE UP (ref 7–23)
CALCIUM SERPL-MCNC: 8 MG/DL — LOW (ref 8.4–10.5)
CHLORIDE SERPL-SCNC: 106 MMOL/L — SIGNIFICANT CHANGE UP (ref 96–108)
CO2 SERPL-SCNC: 15 MMOL/L — LOW (ref 22–31)
CREAT SERPL-MCNC: 0.66 MG/DL — SIGNIFICANT CHANGE UP (ref 0.5–1.3)
EGFR: 102 ML/MIN/1.73M2 — SIGNIFICANT CHANGE UP
GLUCOSE SERPL-MCNC: 111 MG/DL — HIGH (ref 70–99)
HCT VFR BLD CALC: 38 % — LOW (ref 39–50)
HGB BLD-MCNC: 12.7 G/DL — LOW (ref 13–17)
MAGNESIUM SERPL-MCNC: 2.2 MG/DL — SIGNIFICANT CHANGE UP (ref 1.6–2.6)
MCHC RBC-ENTMCNC: 30.8 PG — SIGNIFICANT CHANGE UP (ref 27–34)
MCHC RBC-ENTMCNC: 33.4 GM/DL — SIGNIFICANT CHANGE UP (ref 32–36)
MCV RBC AUTO: 92.2 FL — SIGNIFICANT CHANGE UP (ref 80–100)
NRBC # BLD: 0 /100 WBCS — SIGNIFICANT CHANGE UP (ref 0–0)
PHOSPHATE SERPL-MCNC: 1.7 MG/DL — LOW (ref 2.5–4.5)
PLATELET # BLD AUTO: 323 K/UL — SIGNIFICANT CHANGE UP (ref 150–400)
POTASSIUM SERPL-MCNC: 3.7 MMOL/L — SIGNIFICANT CHANGE UP (ref 3.5–5.3)
POTASSIUM SERPL-SCNC: 3.7 MMOL/L — SIGNIFICANT CHANGE UP (ref 3.5–5.3)
RBC # BLD: 4.12 M/UL — LOW (ref 4.2–5.8)
RBC # FLD: 12.6 % — SIGNIFICANT CHANGE UP (ref 10.3–14.5)
SODIUM SERPL-SCNC: 133 MMOL/L — LOW (ref 135–145)
WBC # BLD: 13.93 K/UL — HIGH (ref 3.8–10.5)
WBC # FLD AUTO: 13.93 K/UL — HIGH (ref 3.8–10.5)

## 2022-08-27 RX ORDER — SODIUM CHLORIDE 9 MG/ML
1000 INJECTION, SOLUTION INTRAVENOUS ONCE
Refills: 0 | Status: COMPLETED | OUTPATIENT
Start: 2022-08-27 | End: 2022-08-27

## 2022-08-27 RX ADMIN — DEXTROSE MONOHYDRATE, SODIUM CHLORIDE, AND POTASSIUM CHLORIDE 125 MILLILITER(S): 50; .745; 4.5 INJECTION, SOLUTION INTRAVENOUS at 05:28

## 2022-08-27 RX ADMIN — DEXTROSE MONOHYDRATE, SODIUM CHLORIDE, AND POTASSIUM CHLORIDE 125 MILLILITER(S): 50; .745; 4.5 INJECTION, SOLUTION INTRAVENOUS at 20:47

## 2022-08-27 RX ADMIN — ENOXAPARIN SODIUM 40 MILLIGRAM(S): 100 INJECTION SUBCUTANEOUS at 18:32

## 2022-08-27 RX ADMIN — PANTOPRAZOLE SODIUM 40 MILLIGRAM(S): 20 TABLET, DELAYED RELEASE ORAL at 17:44

## 2022-08-27 RX ADMIN — CHLORHEXIDINE GLUCONATE 1 APPLICATION(S): 213 SOLUTION TOPICAL at 17:44

## 2022-08-27 RX ADMIN — Medication 85 MILLIMOLE(S): at 20:47

## 2022-08-27 RX ADMIN — SODIUM CHLORIDE 1000 MILLILITER(S): 9 INJECTION, SOLUTION INTRAVENOUS at 17:44

## 2022-08-27 RX ADMIN — DEXTROSE MONOHYDRATE, SODIUM CHLORIDE, AND POTASSIUM CHLORIDE 125 MILLILITER(S): 50; .745; 4.5 INJECTION, SOLUTION INTRAVENOUS at 17:45

## 2022-08-27 RX ADMIN — Medication 127.5 MILLIMOLE(S): at 09:53

## 2022-08-27 RX ADMIN — PANTOPRAZOLE SODIUM 40 MILLIGRAM(S): 20 TABLET, DELAYED RELEASE ORAL at 05:22

## 2022-08-27 NOTE — PROGRESS NOTE ADULT - ASSESSMENT
Assessment:  67 y Male patient S/P exploratory laparotomy for pneumoperitoneum 2/2 duodenal perforation.    Plan:  - midline incision was cleaned and repacked this am    - Pain control meds switched to PO (Tylenol/Oxy)  - C/w protonix 40 mg IV BID   - Patient reporting decreased urine output likely 2/2 multiple loose stools. Given 1L LR and will continue to monitor output  - UGIS negative for leak/obstruction    - Continue to trend phos (2.5 -> 2.1)  - Clear liquid diet  - Activity: PT/OT eval pending  - DVT ppx       ACS  [id number] Assessment:  67 y Male patient S/P exploratory laparotomy for pneumoperitoneum 2/2 duodenal perforation.    Plan:  - Pain control  - C diff test, if negative c/s GI  - Protonix  - Trend phos  - Advance to soft diet  - OOB  - DVT ppx       ACS  [id number]

## 2022-08-27 NOTE — PROGRESS NOTE ADULT - SUBJECTIVE AND OBJECTIVE BOX
POD # 8 ex lap for perf duodenum using omental patch for closure    Overnight events:   - No acute events overnight     SUBJECTIVE:       OBJECTIVE:  Vital Signs Last 24 Hrs  T(C): 38.1 (24 Aug 2022 05:55), Max: 38.1 (24 Aug 2022 05:55)  T(F): 100.5 (24 Aug 2022 05:55), Max: 100.5 (24 Aug 2022 05:55)  HR: 80 (24 Aug 2022 05:55) (80 - 89)  BP: 164/87 (24 Aug 2022 05:55) (146/83 - 184/87)  BP(mean): --  RR: 19 (24 Aug 2022 05:55) (18 - 19)  SpO2: 95% (24 Aug 2022 05:55) (95% - 96%)    Parameters below as of 24 Aug 2022 05:55  Patient On (Oxygen Delivery Method): room air          08-23-22 @ 07:01  -  08-24-22 @ 07:00  --------------------------------------------------------  IN: 4800 mL / OUT: 2390 mL / NET: 2410 mL        Physical Examination:  GEN: NAD, resting quietly  PULM: symmetric chest rise bilaterally, no increased WOB  ABD: soft, appropriately tender, nondistended, no rebound or guarding, midline incision had reduced erythema where staples were removed, and no visible pus at staple line, DINAH drain CDI putting out minimal serosanguinous drainage  EXTR: no LE erythema, moving all extremities      LABS:                        12.1   11.59 )-----------( 180      ( 24 Aug 2022 07:05 )             36.1       08-24    128<L>  |  98  |  6<L>  ----------------------------<  152<H>  3.6   |  23  |  0.61    Ca    7.9<L>      24 Aug 2022 07:05  Phos  1.8     08-24  Mg     2.0     08-24    TPro  5.6<L>  /  Alb  2.2<L>  /  TBili  0.6  /  DBili  0.1  /  AST  39  /  ALT  30  /  AlkPhos  55  08-23       s/p ex lap for perf duodenum using omental patch for closure    Overnight events:   - No acute events overnight     SUBJECTIVE:   Pt seen and examined at bedside by surgical team. Tolerating clears, still having diarrhea    OBJECTIVE:  Vital Signs Last 24 Hrs  T(C): 38.1 (24 Aug 2022 05:55), Max: 38.1 (24 Aug 2022 05:55)  T(F): 100.5 (24 Aug 2022 05:55), Max: 100.5 (24 Aug 2022 05:55)  HR: 80 (24 Aug 2022 05:55) (80 - 89)  BP: 164/87 (24 Aug 2022 05:55) (146/83 - 184/87)  BP(mean): --  RR: 19 (24 Aug 2022 05:55) (18 - 19)  SpO2: 95% (24 Aug 2022 05:55) (95% - 96%)    Parameters below as of 24 Aug 2022 05:55  Patient On (Oxygen Delivery Method): room air          08-23-22 @ 07:01  -  08-24-22 @ 07:00  --------------------------------------------------------  IN: 4800 mL / OUT: 2390 mL / NET: 2410 mL        Physical Examination:  GEN: NAD, resting quietly  PULM: symmetric chest rise bilaterally, no increased WOB  ABD: soft, appropriately tender, nondistended, no rebound or guarding, midline incision had reduced erythema where staples were removed, and no visible pus at staple line, DINAH drain CDI putting out minimal serosanguinous drainage  EXTR: no LE erythema, moving all extremities      LABS:                        12.1   11.59 )-----------( 180      ( 24 Aug 2022 07:05 )             36.1       08-24    128<L>  |  98  |  6<L>  ----------------------------<  152<H>  3.6   |  23  |  0.61    Ca    7.9<L>      24 Aug 2022 07:05  Phos  1.8     08-24  Mg     2.0     08-24    TPro  5.6<L>  /  Alb  2.2<L>  /  TBili  0.6  /  DBili  0.1  /  AST  39  /  ALT  30  /  AlkPhos  55  08-23

## 2022-08-28 LAB
ANION GAP SERPL CALC-SCNC: 9 MMOL/L — SIGNIFICANT CHANGE UP (ref 5–17)
BUN SERPL-MCNC: 8 MG/DL — SIGNIFICANT CHANGE UP (ref 7–23)
CALCIUM SERPL-MCNC: 7.3 MG/DL — LOW (ref 8.4–10.5)
CHLORIDE SERPL-SCNC: 104 MMOL/L — SIGNIFICANT CHANGE UP (ref 96–108)
CO2 SERPL-SCNC: 20 MMOL/L — LOW (ref 22–31)
CREAT SERPL-MCNC: 0.6 MG/DL — SIGNIFICANT CHANGE UP (ref 0.5–1.3)
EGFR: 104 ML/MIN/1.73M2 — SIGNIFICANT CHANGE UP
GLUCOSE SERPL-MCNC: 120 MG/DL — HIGH (ref 70–99)
HCT VFR BLD CALC: 32.3 % — LOW (ref 39–50)
HGB BLD-MCNC: 11.3 G/DL — LOW (ref 13–17)
MAGNESIUM SERPL-MCNC: 1.9 MG/DL — SIGNIFICANT CHANGE UP (ref 1.6–2.6)
MCHC RBC-ENTMCNC: 31.8 PG — SIGNIFICANT CHANGE UP (ref 27–34)
MCHC RBC-ENTMCNC: 35 GM/DL — SIGNIFICANT CHANGE UP (ref 32–36)
MCV RBC AUTO: 91 FL — SIGNIFICANT CHANGE UP (ref 80–100)
NRBC # BLD: 0 /100 WBCS — SIGNIFICANT CHANGE UP (ref 0–0)
PHOSPHATE SERPL-MCNC: 2.5 MG/DL — SIGNIFICANT CHANGE UP (ref 2.5–4.5)
PLATELET # BLD AUTO: 351 K/UL — SIGNIFICANT CHANGE UP (ref 150–400)
POTASSIUM SERPL-MCNC: 3.3 MMOL/L — LOW (ref 3.5–5.3)
POTASSIUM SERPL-SCNC: 3.3 MMOL/L — LOW (ref 3.5–5.3)
RBC # BLD: 3.55 M/UL — LOW (ref 4.2–5.8)
RBC # FLD: 12.5 % — SIGNIFICANT CHANGE UP (ref 10.3–14.5)
SODIUM SERPL-SCNC: 133 MMOL/L — LOW (ref 135–145)
WBC # BLD: 10.72 K/UL — HIGH (ref 3.8–10.5)
WBC # FLD AUTO: 10.72 K/UL — HIGH (ref 3.8–10.5)

## 2022-08-28 RX ORDER — CALCIUM GLUCONATE 100 MG/ML
2 VIAL (ML) INTRAVENOUS ONCE
Refills: 0 | Status: COMPLETED | OUTPATIENT
Start: 2022-08-28 | End: 2022-08-28

## 2022-08-28 RX ORDER — POTASSIUM CHLORIDE 20 MEQ
10 PACKET (EA) ORAL ONCE
Refills: 0 | Status: COMPLETED | OUTPATIENT
Start: 2022-08-28 | End: 2022-08-28

## 2022-08-28 RX ORDER — POTASSIUM CHLORIDE 20 MEQ
10 PACKET (EA) ORAL ONCE
Refills: 0 | Status: DISCONTINUED | OUTPATIENT
Start: 2022-08-28 | End: 2022-08-28

## 2022-08-28 RX ADMIN — Medication 127.5 MILLIMOLE(S): at 06:18

## 2022-08-28 RX ADMIN — ENOXAPARIN SODIUM 40 MILLIGRAM(S): 100 INJECTION SUBCUTANEOUS at 18:03

## 2022-08-28 RX ADMIN — Medication 200 GRAM(S): at 11:37

## 2022-08-28 RX ADMIN — Medication 100 MILLIEQUIVALENT(S): at 12:55

## 2022-08-28 RX ADMIN — PANTOPRAZOLE SODIUM 40 MILLIGRAM(S): 20 TABLET, DELAYED RELEASE ORAL at 05:01

## 2022-08-28 RX ADMIN — Medication 127.5 MILLIMOLE(S): at 16:10

## 2022-08-28 RX ADMIN — PANTOPRAZOLE SODIUM 40 MILLIGRAM(S): 20 TABLET, DELAYED RELEASE ORAL at 18:03

## 2022-08-28 NOTE — PROGRESS NOTE ADULT - SUBJECTIVE AND OBJECTIVE BOX
Surgery Progress Note     24hour Events:     Subjective:  Patient seen and examined.       Vital Signs:  Vital Signs Last 24 Hrs  T(C): 37.5 (28 Aug 2022 01:05), Max: 37.5 (28 Aug 2022 01:05)  T(F): 99.5 (28 Aug 2022 01:05), Max: 99.5 (28 Aug 2022 01:05)  HR: 74 (28 Aug 2022 01:05) (64 - 81)  BP: 135/74 (28 Aug 2022 01:05) (115/79 - 142/64)  BP(mean): --  RR: 18 (28 Aug 2022 01:05) (18 - 19)  SpO2: 99% (28 Aug 2022 01:05) (98% - 99%)    Parameters below as of 28 Aug 2022 01:05  Patient On (Oxygen Delivery Method): room air        CAPILLARY BLOOD GLUCOSE          I&O's Detail    26 Aug 2022 07:01  -  27 Aug 2022 07:00  --------------------------------------------------------  IN:    dextrose 5% + sodium chloride 0.9% w/ Additives: 3000 mL    IV PiggyBack: 500 mL    Oral Fluid: 440 mL  Total IN: 3940 mL    OUT:    Accordian (mL): 0 mL    Bulb (mL): 5 mL    Voided (mL): 1600 mL  Total OUT: 1605 mL    Total NET: 2335 mL      27 Aug 2022 07:01  -  28 Aug 2022 03:10  --------------------------------------------------------  IN:    dextrose 5% + sodium chloride 0.9% w/ Additives: 1500 mL    IV PiggyBack: 1000 mL    Lactated Ringers Bolus: 1000 mL    Oral Fluid: 260 mL  Total IN: 3760 mL    OUT:    Bulb (mL): 45 mL    Voided (mL): 750 mL  Total OUT: 795 mL    Total NET: 2965 mL            Physical Exam:  GEN: NAD, resting quietly  PULM: symmetric chest rise bilaterally, no increased WOB  ABD: soft, appropriately tender, nondistended, no rebound or guarding, midline incision had reduced erythema where staples were removed, and no visible pus at staple line, DINAH drain CDI putting out minimal serosanguinous drainage  EXTR: no LE erythema, moving all extremities      Labs:    08-27    133<L>  |  106  |  12  ----------------------------<  111<H>  3.7   |  15<L>  |  0.66    Ca    8.0<L>      27 Aug 2022 07:41  Phos  1.7     08-27  Mg     2.2     08-27                              12.7   13.93 )-----------( 323      ( 27 Aug 2022 07:38 )             38.0

## 2022-08-28 NOTE — PATIENT PROFILE ADULT - NSPROMEDSADMININFO_GEN_A_NUR
“Patient's name, , procedure and correct site were confirmed during the [facility] Timeout.” no concerns

## 2022-08-28 NOTE — PATIENT PROFILE ADULT - FALL HARM RISK - HARM RISK INTERVENTIONS
Assistance with ambulation/Assistance OOB with selected safe patient handling equipment/Communicate Risk of Fall with Harm to all staff/Discuss with provider need for PT consult/Monitor gait and stability/Provide patient with walking aids - walker, cane, crutches/Reinforce activity limits and safety measures with patient and family/Sit up slowly, dangle for a short time, stand at bedside before walking/Tailored Fall Risk Interventions/Use of alarms - bed, chair and/or voice tab/Visual Cue: Yellow wristband and red socks/Bed in lowest position, wheels locked, appropriate side rails in place/Call bell, personal items and telephone in reach/Instruct patient to call for assistance before getting out of bed or chair/Non-slip footwear when patient is out of bed/[place] to call system/Physically safe environment - no spills, clutter or unnecessary equipment/Purposeful Proactive Rounding/Room/bathroom lighting operational, light cord in reach

## 2022-08-28 NOTE — PROGRESS NOTE ADULT - ASSESSMENT
Assessment:  67 y Male patient S/P exploratory laparotomy for pneumoperitoneum 2/2 duodenal perforation.    Plan:  - Pain control  - C diff test, if negative c/s GI  - protonix  - Trend phos  - Advance to soft diet  - OOB  - DVT ppx       ACS  [id number] Assessment:  67 y Male patient S/P exploratory laparotomy for pneumoperitoneum 2/2 duodenal perforation.    Plan:  - Pain control  - C diff test, if negative c/s GI  - protonix  - Trend phos  - regular diet  - OOB  - DVT ppx       ACS  [id number]

## 2022-08-29 LAB
ANION GAP SERPL CALC-SCNC: 12 MMOL/L — SIGNIFICANT CHANGE UP (ref 5–17)
BUN SERPL-MCNC: 8 MG/DL — SIGNIFICANT CHANGE UP (ref 7–23)
CALCIUM SERPL-MCNC: 8.2 MG/DL — LOW (ref 8.4–10.5)
CHLORIDE SERPL-SCNC: 97 MMOL/L — SIGNIFICANT CHANGE UP (ref 96–108)
CO2 SERPL-SCNC: 22 MMOL/L — SIGNIFICANT CHANGE UP (ref 22–31)
CREAT SERPL-MCNC: 0.71 MG/DL — SIGNIFICANT CHANGE UP (ref 0.5–1.3)
EGFR: 99 ML/MIN/1.73M2 — SIGNIFICANT CHANGE UP
GLUCOSE SERPL-MCNC: 94 MG/DL — SIGNIFICANT CHANGE UP (ref 70–99)
HCT VFR BLD CALC: 32.9 % — LOW (ref 39–50)
HGB BLD-MCNC: 11.6 G/DL — LOW (ref 13–17)
MAGNESIUM SERPL-MCNC: 1.9 MG/DL — SIGNIFICANT CHANGE UP (ref 1.6–2.6)
MCHC RBC-ENTMCNC: 32.8 PG — SIGNIFICANT CHANGE UP (ref 27–34)
MCHC RBC-ENTMCNC: 35.3 GM/DL — SIGNIFICANT CHANGE UP (ref 32–36)
MCV RBC AUTO: 92.9 FL — SIGNIFICANT CHANGE UP (ref 80–100)
NRBC # BLD: 0 /100 WBCS — SIGNIFICANT CHANGE UP (ref 0–0)
PHOSPHATE SERPL-MCNC: 2.1 MG/DL — LOW (ref 2.5–4.5)
PLATELET # BLD AUTO: 436 K/UL — HIGH (ref 150–400)
POTASSIUM SERPL-MCNC: 4.4 MMOL/L — SIGNIFICANT CHANGE UP (ref 3.5–5.3)
POTASSIUM SERPL-SCNC: 4.4 MMOL/L — SIGNIFICANT CHANGE UP (ref 3.5–5.3)
PROCALCITONIN SERPL-MCNC: 0.4 NG/ML — HIGH (ref 0.02–0.1)
RBC # BLD: 3.54 M/UL — LOW (ref 4.2–5.8)
RBC # FLD: 12.6 % — SIGNIFICANT CHANGE UP (ref 10.3–14.5)
SODIUM SERPL-SCNC: 131 MMOL/L — LOW (ref 135–145)
WBC # BLD: 12.11 K/UL — HIGH (ref 3.8–10.5)
WBC # FLD AUTO: 12.11 K/UL — HIGH (ref 3.8–10.5)

## 2022-08-29 PROCEDURE — 74177 CT ABD & PELVIS W/CONTRAST: CPT | Mod: 26

## 2022-08-29 RX ORDER — PIPERACILLIN AND TAZOBACTAM 4; .5 G/20ML; G/20ML
3.38 INJECTION, POWDER, LYOPHILIZED, FOR SOLUTION INTRAVENOUS EVERY 8 HOURS
Refills: 0 | Status: DISCONTINUED | OUTPATIENT
Start: 2022-08-29 | End: 2022-09-01

## 2022-08-29 RX ORDER — PIPERACILLIN AND TAZOBACTAM 4; .5 G/20ML; G/20ML
3.38 INJECTION, POWDER, LYOPHILIZED, FOR SOLUTION INTRAVENOUS ONCE
Refills: 0 | Status: COMPLETED | OUTPATIENT
Start: 2022-08-29 | End: 2022-08-29

## 2022-08-29 RX ADMIN — PANTOPRAZOLE SODIUM 40 MILLIGRAM(S): 20 TABLET, DELAYED RELEASE ORAL at 17:16

## 2022-08-29 RX ADMIN — Medication 85 MILLIMOLE(S): at 17:40

## 2022-08-29 RX ADMIN — PANTOPRAZOLE SODIUM 40 MILLIGRAM(S): 20 TABLET, DELAYED RELEASE ORAL at 05:05

## 2022-08-29 RX ADMIN — Medication 127.5 MILLIMOLE(S): at 09:50

## 2022-08-29 RX ADMIN — PIPERACILLIN AND TAZOBACTAM 200 GRAM(S): 4; .5 INJECTION, POWDER, LYOPHILIZED, FOR SOLUTION INTRAVENOUS at 17:16

## 2022-08-29 RX ADMIN — PIPERACILLIN AND TAZOBACTAM 25 GRAM(S): 4; .5 INJECTION, POWDER, LYOPHILIZED, FOR SOLUTION INTRAVENOUS at 22:50

## 2022-08-29 NOTE — PROGRESS NOTE ADULT - SUBJECTIVE AND OBJECTIVE BOX
Surgery Progress Note      SUBJECTIVE: Patient seen and examined at bedside with surgical team. No acute events overnight.     OBJECTIVE:    Vital Signs Last 24 Hrs  T(C): 36.8 (29 Aug 2022 00:55), Max: 37.6 (28 Aug 2022 05:00)  T(F): 98.3 (29 Aug 2022 00:55), Max: 99.7 (28 Aug 2022 05:00)  HR: 73 (29 Aug 2022 00:55) (73 - 86)  BP: 138/73 (29 Aug 2022 00:55) (130/70 - 150/77)  BP(mean): --  RR: 18 (29 Aug 2022 00:55) (18 - 18)  SpO2: 99% (29 Aug 2022 00:55) (98% - 99%)    Parameters below as of 29 Aug 2022 00:55  Patient On (Oxygen Delivery Method): room air    I&O's Detail    27 Aug 2022 07:01  -  28 Aug 2022 07:00  --------------------------------------------------------  IN:    dextrose 5% + sodium chloride 0.9% w/ Additives: 3000 mL    IV PiggyBack: 1000 mL    Lactated Ringers Bolus: 1000 mL    Oral Fluid: 500 mL  Total IN: 5500 mL    OUT:    Bulb (mL): 45 mL    Voided (mL): 750 mL  Total OUT: 795 mL    Total NET: 4705 mL      28 Aug 2022 07:01  -  29 Aug 2022 02:05  --------------------------------------------------------  IN:    Oral Fluid: 540 mL  Total IN: 540 mL    OUT:    Bulb (mL): 90 mL    Voided (mL): 500 mL  Total OUT: 590 mL    Total NET: -50 mL      MEDICATIONS  (STANDING):  enoxaparin Injectable 40 milliGRAM(s) SubCutaneous every 24 hours  pantoprazole  Injectable 40 milliGRAM(s) IV Push two times a day  sodium phosphate IVPB 30 milliMole(s) IV Intermittent once    MEDICATIONS  (PRN):  acetaminophen     Tablet .. 650 milliGRAM(s) Oral every 6 hours PRN Mild Pain (1 - 3)  oxyCODONE    IR 5 milliGRAM(s) Oral every 4 hours PRN Moderate Pain (4 - 6)  oxyCODONE    IR 10 milliGRAM(s) Oral every 4 hours PRN Severe Pain (7 - 10)      PHYSICAL EXAM:  GEN: NAD, resting quietly  PULM: symmetric chest rise bilaterally, no increased WOB  ABD: soft, appropriately tender, nondistended, no rebound or guarding, midline incision had reduced erythema where staples were removed, and no visible pus at staple line, DINAH drain CDI putting out minimal serosanguinous drainage  EXTR: no LE erythema, moving all extremities    LABS:                        11.3   10.72 )-----------( 351      ( 28 Aug 2022 04:22 )             32.3     08-28    133<L>  |  104  |  8   ----------------------------<  120<H>  3.3<L>   |  20<L>  |  0.60    Ca    7.3<L>      28 Aug 2022 04:22  Phos  2.5     08-28  Mg     1.9     08-28                 Surgery Progress Note      SUBJECTIVE: Patient seen and examined at bedside with surgical team. No acute events overnight. Pain is controlled. Patient is tolerating diet.     OBJECTIVE:    Vital Signs Last 24 Hrs  T(C): 36.8 (29 Aug 2022 00:55), Max: 37.6 (28 Aug 2022 05:00)  T(F): 98.3 (29 Aug 2022 00:55), Max: 99.7 (28 Aug 2022 05:00)  HR: 73 (29 Aug 2022 00:55) (73 - 86)  BP: 138/73 (29 Aug 2022 00:55) (130/70 - 150/77)  BP(mean): --  RR: 18 (29 Aug 2022 00:55) (18 - 18)  SpO2: 99% (29 Aug 2022 00:55) (98% - 99%)    Parameters below as of 29 Aug 2022 00:55  Patient On (Oxygen Delivery Method): room air    I&O's Detail    27 Aug 2022 07:01  -  28 Aug 2022 07:00  --------------------------------------------------------  IN:    dextrose 5% + sodium chloride 0.9% w/ Additives: 3000 mL    IV PiggyBack: 1000 mL    Lactated Ringers Bolus: 1000 mL    Oral Fluid: 500 mL  Total IN: 5500 mL    OUT:    Bulb (mL): 45 mL    Voided (mL): 750 mL  Total OUT: 795 mL    Total NET: 4705 mL      28 Aug 2022 07:01  -  29 Aug 2022 02:05  --------------------------------------------------------  IN:    Oral Fluid: 540 mL  Total IN: 540 mL    OUT:    Bulb (mL): 90 mL    Voided (mL): 500 mL  Total OUT: 590 mL    Total NET: -50 mL      MEDICATIONS  (STANDING):  enoxaparin Injectable 40 milliGRAM(s) SubCutaneous every 24 hours  pantoprazole  Injectable 40 milliGRAM(s) IV Push two times a day  sodium phosphate IVPB 30 milliMole(s) IV Intermittent once    MEDICATIONS  (PRN):  acetaminophen     Tablet .. 650 milliGRAM(s) Oral every 6 hours PRN Mild Pain (1 - 3)  oxyCODONE    IR 5 milliGRAM(s) Oral every 4 hours PRN Moderate Pain (4 - 6)  oxyCODONE    IR 10 milliGRAM(s) Oral every 4 hours PRN Severe Pain (7 - 10)      PHYSICAL EXAM:  GEN: NAD, resting quietly  PULM: symmetric chest rise bilaterally, no increased WOB  ABD: soft, appropriately tender, nondistended, no rebound or guarding, midline incision had reduced erythema where staples were removed, and no visible pus at staple line, DINAH drain CDI putting out minimal serosanguinous drainage  EXTR: no LE erythema, moving all extremities    LABS:                        11.3   10.72 )-----------( 351      ( 28 Aug 2022 04:22 )             32.3     08-28    133<L>  |  104  |  8   ----------------------------<  120<H>  3.3<L>   |  20<L>  |  0.60    Ca    7.3<L>      28 Aug 2022 04:22  Phos  2.5     08-28  Mg     1.9     08-28

## 2022-08-29 NOTE — CONSULT NOTE ADULT - SUBJECTIVE AND OBJECTIVE BOX
Interventional Radiology    Evaluate for Procedure: Abd collection drainage    HPI: 67 year old male s/p repair of duodenal perforation with a multipart peripherally enhancing collection in the perihepatic space with a reference component measuring 13.7 cm anterior to the inferior left lobe. IR consulted for drainage.    Allergies:   Medications (Abx/Cardiac/Anticoagulation/Blood Products)  enoxaparin Injectable: 40 milliGRAM(s) SubCutaneous (08-28 @ 18:03)  piperacillin/tazobactam IVPB.: 200 mL/Hr IV Intermittent (08-29 @ 17:16)    Data:  T(C): 36.8  HR: 85  BP: 143/72  RR: 18  SpO2: 99%    -WBC 12.11 / HgB 11.6 / Hct 32.9 / Plt 436  -Na 131 / Cl 97 / BUN 8 / Glucose 94  -K 4.4 / CO2 22 / Cr 0.71  -ALT -- / Alk Phos -- / T.Bili --  -INR 1.04 / PTT 25.5    Radiology: < from: CT Abdomen and Pelvis w/ IV Cont (08.29.22 @ 14:08) >  PROCEDURE DATE:  08/29/2022      INTERPRETATION:  CLINICAL INFORMATION: Persistent leukocytosis. Status   post exploratory laparotomy for pneumoperitoneum, duodenal  perforation found and appeared using the falciform ligament as a patch on   8/19/2022. DINAH drain was left in the right upper quadrant anterior to the   repair.    COMPARISON: CTA chest, abdomen and pelvis 7/19/2022.    CONTRAST/COMPLICATIONS:  IV Contrast: Omnipaque 350  90 cc administered   0 cc discarded  Oral Contrast: NONE  Complications: None reported at time of study completion    PROCEDURE:  CT of the Abdomen and Pelvis was performed.  Sagittal and coronal reformats were performed.    FINDINGS:  LOWER CHEST: A small right and a trace left pleural effusion with   adjacent passive atelectasis. In the right lower lobe, there is a 3.4 cm   focal consolidative opacity with surrounding tree-in-bud nodularity, new   since 8/19/2022, suspicious for infection. A trace pericardial effusion,   new.    LIVER: A couple subcentimeter hypo densities that are too small to   characterize.  BILE DUCTS: Normal caliber.  GALLBLADDER: Mild gallbladder wall edema of a nondistended gallbladder,   nonspecific in the setting of ascites.  SPLEEN: Within normal limits.  PANCREAS: Within normal limits.  ADRENALS: Within normal limits.  KIDNEYS/URETERS: Within normal limits.    BLADDER: Small intravesicular air; correlate for recent instrumentation.  REPRODUCTIVE ORGANS: Borderline prostatomegaly measuring 4.9 cm   transverse dimension.    BOWEL/PERITONEUM: Status post repair of a duodenal perforation. In the   perihepatic space, there is a multipart rim-enhancing collection. A large   component at the anterior inferior margin of the left lobe measures 13.7   x 2.5 x 5.9 (3:53). A component in the perihepatic space abutting the   lateral right lobe and resulting in scalloping of the liver measures 5.3   x 1.9 x 7 cm (3:52). A right abdominal approach drainage catheter courses   through the dominant components of the collection and terminates in the   anterior inferior component, just deep to the right chest wall. Small   pneumoperitoneum associated with the perihepatic collection(s).   Additionally, there is a rim enhancing collection in the deep pelvis   anterior to the upper rectum measuring 2.4 x 3.8 cm (3:134). Trace free   ascites. Wall thickening versus underdistention of the sigmoid colon and   rectum. No bowel obstruction.    VESSELS: Atherosclerotic changes. Patent portal and hepatic veins.  RETROPERITONEUM/LYMPH NODES: No lymphadenopathy.  ABDOMINAL WALL: A ventral abdominal wall midline incision with skin   staples.  BONES: Degenerative changes.    IMPRESSION:  A right lower lobe consolidative opacity, new since 8/19/2022, favoring   infection. A small right pleural effusion and a trace left pleural   effusion.    Status post repair of duodenal perforation with a multipart peripherally   enhancing collection in the perihepatic space with a reference component   measuring 13.7 cm anterior to the inferior left lobe. A right abdominal   approach drainage catheter traverses the dominant components of the   perihepatic collection. Additionally, in the deep pelvis, there is a   rim-enhancing collection measuring up to 3.8 cm anterior to the rectum.    Wall thickening versus underdistention of the sigmoid colon and rectum.   Correlate clinically for proctocolitis.      Assessment/Plan: 67 year old male s/p repair of duodenal perforation with a multipart peripherally enhancing collection in the perihepatic space with a reference component measuring 13.7 cm anterior to the inferior left lobe. IR consulted for drainage.    - Will plan for abdominal drainage under CT scan guidance tomorrow 8/30/22  - Place IR procedure order, approved by Dr. Garcia  - Make patient NPO after midnight  - Maintain active type and screen  - COVIID swab within 5 days of procedure  - Send AM labs including coags on 8/30/22  - Hold anticoagulation  - Case discussed with Dr. Garcia

## 2022-08-29 NOTE — PROGRESS NOTE ADULT - NS ATTEND AMEND GEN_ALL_CORE FT
66 yo male, s/p [place] patch repair of duodenal perforation.  - Abdominal exam benign, however drain appears light brown, persistent leukocytosis and procalcitonin at 0.4. Will obtain CT AP to evaluate for potential infectious source.  - Also noted to have painful hemorrhoids, will f/u as outpatient.

## 2022-08-29 NOTE — PROGRESS NOTE ADULT - ASSESSMENT
Assessment:  67 y Male patient S/P exploratory laparotomy for pneumoperitoneum 2/2 duodenal perforation.    Plan:  - Pain control  - C diff negative  - protonix  - Trend phos  - regular diet  - OOB  - DVT ppx       ACS  [id number] Assessment:  67 y Male patient S/P exploratory laparotomy for pneumoperitoneum 2/2 duodenal perforation.    Plan:  - Pain control  - C diff negative  - protonix  - Trend phos  - regular diet  - OOB  - DVT ppx  - CT scan today     ACS  [id number] never

## 2022-08-30 LAB
ANION GAP SERPL CALC-SCNC: 12 MMOL/L — SIGNIFICANT CHANGE UP (ref 5–17)
APTT BLD: 38.6 SEC — HIGH (ref 27.5–35.5)
BLD GP AB SCN SERPL QL: NEGATIVE — SIGNIFICANT CHANGE UP
BUN SERPL-MCNC: 8 MG/DL — SIGNIFICANT CHANGE UP (ref 7–23)
CALCIUM SERPL-MCNC: 8 MG/DL — LOW (ref 8.4–10.5)
CHLORIDE SERPL-SCNC: 94 MMOL/L — LOW (ref 96–108)
CO2 SERPL-SCNC: 22 MMOL/L — SIGNIFICANT CHANGE UP (ref 22–31)
CREAT SERPL-MCNC: 0.71 MG/DL — SIGNIFICANT CHANGE UP (ref 0.5–1.3)
EGFR: 99 ML/MIN/1.73M2 — SIGNIFICANT CHANGE UP
GLUCOSE SERPL-MCNC: 102 MG/DL — HIGH (ref 70–99)
HCT VFR BLD CALC: 34.6 % — LOW (ref 39–50)
HGB BLD-MCNC: 11.7 G/DL — LOW (ref 13–17)
INR BLD: 1.28 RATIO — HIGH (ref 0.88–1.16)
MAGNESIUM SERPL-MCNC: 2.1 MG/DL — SIGNIFICANT CHANGE UP (ref 1.6–2.6)
MCHC RBC-ENTMCNC: 30.5 PG — SIGNIFICANT CHANGE UP (ref 27–34)
MCHC RBC-ENTMCNC: 33.8 GM/DL — SIGNIFICANT CHANGE UP (ref 32–36)
MCV RBC AUTO: 90.1 FL — SIGNIFICANT CHANGE UP (ref 80–100)
NRBC # BLD: 0 /100 WBCS — SIGNIFICANT CHANGE UP (ref 0–0)
PHOSPHATE SERPL-MCNC: 2.3 MG/DL — LOW (ref 2.5–4.5)
PLATELET # BLD AUTO: 432 K/UL — HIGH (ref 150–400)
POTASSIUM SERPL-MCNC: 3.6 MMOL/L — SIGNIFICANT CHANGE UP (ref 3.5–5.3)
POTASSIUM SERPL-SCNC: 3.6 MMOL/L — SIGNIFICANT CHANGE UP (ref 3.5–5.3)
PROCALCITONIN SERPL-MCNC: 0.35 NG/ML — HIGH (ref 0.02–0.1)
PROTHROM AB SERPL-ACNC: 14.8 SEC — HIGH (ref 10.5–13.4)
RBC # BLD: 3.84 M/UL — LOW (ref 4.2–5.8)
RBC # FLD: 12.4 % — SIGNIFICANT CHANGE UP (ref 10.3–14.5)
RH IG SCN BLD-IMP: POSITIVE — SIGNIFICANT CHANGE UP
SODIUM SERPL-SCNC: 128 MMOL/L — LOW (ref 135–145)
WBC # BLD: 10.28 K/UL — SIGNIFICANT CHANGE UP (ref 3.8–10.5)
WBC # FLD AUTO: 10.28 K/UL — SIGNIFICANT CHANGE UP (ref 3.8–10.5)

## 2022-08-30 PROCEDURE — 49406 IMAGE CATH FLUID PERI/RETRO: CPT

## 2022-08-30 RX ORDER — ENOXAPARIN SODIUM 100 MG/ML
40 INJECTION SUBCUTANEOUS EVERY 24 HOURS
Refills: 0 | Status: DISCONTINUED | OUTPATIENT
Start: 2022-08-30 | End: 2022-09-08

## 2022-08-30 RX ORDER — ACETAMINOPHEN 500 MG
650 TABLET ORAL EVERY 6 HOURS
Refills: 0 | Status: DISCONTINUED | OUTPATIENT
Start: 2022-08-30 | End: 2022-09-08

## 2022-08-30 RX ORDER — ONDANSETRON 8 MG/1
4 TABLET, FILM COATED ORAL ONCE
Refills: 0 | Status: DISCONTINUED | OUTPATIENT
Start: 2022-08-30 | End: 2022-08-30

## 2022-08-30 RX ORDER — HYDROMORPHONE HYDROCHLORIDE 2 MG/ML
0.5 INJECTION INTRAMUSCULAR; INTRAVENOUS; SUBCUTANEOUS
Refills: 0 | Status: DISCONTINUED | OUTPATIENT
Start: 2022-08-30 | End: 2022-08-30

## 2022-08-30 RX ORDER — SODIUM CHLORIDE 9 MG/ML
1000 INJECTION, SOLUTION INTRAVENOUS
Refills: 0 | Status: DISCONTINUED | OUTPATIENT
Start: 2022-08-30 | End: 2022-08-30

## 2022-08-30 RX ORDER — POTASSIUM CHLORIDE 20 MEQ
10 PACKET (EA) ORAL
Refills: 0 | Status: COMPLETED | OUTPATIENT
Start: 2022-08-30 | End: 2022-08-30

## 2022-08-30 RX ADMIN — Medication 100 MILLIEQUIVALENT(S): at 13:34

## 2022-08-30 RX ADMIN — PIPERACILLIN AND TAZOBACTAM 25 GRAM(S): 4; .5 INJECTION, POWDER, LYOPHILIZED, FOR SOLUTION INTRAVENOUS at 05:07

## 2022-08-30 RX ADMIN — Medication 650 MILLIGRAM(S): at 21:17

## 2022-08-30 RX ADMIN — PANTOPRAZOLE SODIUM 40 MILLIGRAM(S): 20 TABLET, DELAYED RELEASE ORAL at 18:56

## 2022-08-30 RX ADMIN — Medication 100 MILLIEQUIVALENT(S): at 15:27

## 2022-08-30 RX ADMIN — Medication 650 MILLIGRAM(S): at 20:47

## 2022-08-30 RX ADMIN — Medication 63.75 MILLIMOLE(S): at 18:57

## 2022-08-30 RX ADMIN — ENOXAPARIN SODIUM 40 MILLIGRAM(S): 100 INJECTION SUBCUTANEOUS at 15:23

## 2022-08-30 RX ADMIN — PIPERACILLIN AND TAZOBACTAM 25 GRAM(S): 4; .5 INJECTION, POWDER, LYOPHILIZED, FOR SOLUTION INTRAVENOUS at 15:20

## 2022-08-30 RX ADMIN — Medication 100 MILLIEQUIVALENT(S): at 12:01

## 2022-08-30 RX ADMIN — PANTOPRAZOLE SODIUM 40 MILLIGRAM(S): 20 TABLET, DELAYED RELEASE ORAL at 05:07

## 2022-08-30 NOTE — CHART NOTE - NSCHARTNOTEFT_GEN_A_CORE
S: Patient is doing well after his IR procedure for joshua-hepatic abscess/collection. Patient denies nausea/vomiting. Pain is currently well controlled. Continues to have watery bowel movements and is passing gas.    O:   ICU Vital Signs Last 24 Hrs  T(C): 37.8 (30 Aug 2022 16:23), Max: 38.1 (30 Aug 2022 06:04)  T(F): 100.1 (30 Aug 2022 16:23), Max: 100.6 (30 Aug 2022 06:04)  HR: 79 (30 Aug 2022 16:23) (69 - 89)  BP: 135/70 (30 Aug 2022 16:23) (107/69 - 139/75)  BP(mean): 88 (30 Aug 2022 13:00) (85 - 99)  ABP: --  ABP(mean): --  RR: 18 (30 Aug 2022 16:23) (12 - 18)  SpO2: 97% (30 Aug 2022 16:23) (96% - 100%)    O2 Parameters below as of 30 Aug 2022 16:23  Patient On (Oxygen Delivery Method): room air    Exam:  AOx4  Symmetric chest rise, non-labored breathing  Midline incision saturated with brown, purulent material. Incision is erythematous around the edges.  DINAH draining serosanguinous fluid  IR drain draining brown murky fluid  Soft, nontender, mildly distended    A/P  67 y Male patient S/P exploratory laparotomy for pneumoperitoneum 2/2 duodenal perforation with new found evidence of perihepatic enhancing mass (likely abscess).   - Continue to monitor drain output  - Restart reg diet  - Restart DVT ppx  - Continue to monitor wound drainage

## 2022-08-30 NOTE — PROGRESS NOTE ADULT - ASSESSMENT
Assessment:  67 y Male patient S/P exploratory laparotomy for pneumoperitoneum 2/2 duodenal perforation with new found evidence of perihepatic enhancing mass (likely abscess).     Plan:  - IR for perihepatic drainage and drain placement  - Pain control (oxycodone 5/10mg IR when patient tolerating reg diet)  - C diff negative, will start sitz bath  - Protonix BID  - Continue to replete electrolytes  - NPO for IR procedure today, can restart diet after patient has had procedure  - DVT ppx held for IR, can restart 6hrs after end of procedure    ACS  [id number]

## 2022-08-30 NOTE — PRE-ANESTHESIA EVALUATION ADULT - NSANTHADDINFOFT_GEN_ALL_CORE
Risks and benefits of IV sedation discussed with patient and/or family including n/v, conversion to GA and cardiopulmonary complications.

## 2022-08-30 NOTE — PROGRESS NOTE ADULT - SUBJECTIVE AND OBJECTIVE BOX
SUBJECTIVE: Patient seen and examined at bedside on AM rounds. Patient found to be febrile o/n (100.6) and tachycardic (102). Patient reports that they're feeling well. NPO s/p midnight for IR procedure today. Denies nausea, vomiting. OOB/Amublating as tolerated. Denies chills.    ALLERGIES:  No Known Allergies  --------------------------------------------------------------------------------------    MEDICATIONS:    Neurologic Medications  acetaminophen     Tablet .. 650 milliGRAM(s) Oral every 6 hours PRN Mild Pain (1 - 3)  HYDROmorphone  Injectable 0.5 milliGRAM(s) IV Push every 10 minutes PRN Moderate Pain (4 - 6)  ondansetron Injectable 4 milliGRAM(s) IV Push once PRN Nausea and/or Vomiting  oxyCODONE    IR 5 milliGRAM(s) Oral every 4 hours PRN Moderate Pain (4 - 6)  oxyCODONE    IR 10 milliGRAM(s) Oral every 4 hours PRN Severe Pain (7 - 10)    Respiratory Medications    Cardiovascular Medications    Gastrointestinal Medications  pantoprazole  Injectable 40 milliGRAM(s) IV Push two times a day  potassium chloride  10 mEq/100 mL IVPB 10 milliEquivalent(s) IV Intermittent every 1 hour  sodium phosphate IVPB 15 milliMole(s) IV Intermittent once    Genitourinary Medications    Hematologic/Oncologic Medications    Antimicrobial/Immunologic Medications  piperacillin/tazobactam IVPB.. 3.375 Gram(s) IV Intermittent every 8 hours    Endocrine/Metabolic Medications    Topical/Other Medications    --------------------------------------------------------------------------------------    VITAL SIGNS:  ICU Vital Signs Last 24 Hrs  T(C): 37.3 (30 Aug 2022 10:05), Max: 38.1 (30 Aug 2022 06:04)  T(F): 99.2 (30 Aug 2022 09:58), Max: 100.6 (30 Aug 2022 06:04)  HR: 80 (30 Aug 2022 10:05) (75 - 102)  BP: 127/63 (30 Aug 2022 10:05) (121/66 - 143/72)  BP(mean): --  ABP: --  ABP(mean): --  RR: 18 (30 Aug 2022 09:58) (18 - 18)  SpO2: 100% (30 Aug 2022 10:05) (96% - 100%)    O2 Parameters below as of 30 Aug 2022 09:58  Patient On (Oxygen Delivery Method): room air    --------------------------------------------------------------------------------------    EXAM    General: NAD, resting in bed comfortably.  Cardiac: regular rate, warm and well perfused  Respiratory: Nonlabored respirations, normal cw expansion.  Abdomen: Soft, nontender, nondistended. DINAH draining serosanginous fluid.   Extremities: normal strength, FROM, no deformities    --------------------------------------------------------------------------------------    LABS                        11.7   10.28 )-----------( 432      ( 30 Aug 2022 08:27 )             34.6   08-30    128<L>  |  94<L>  |  8   ----------------------------<  102<H>  3.6   |  22  |  0.71    Ca    8.0<L>      30 Aug 2022 08:20  Phos  2.3     08-30  Mg     2.1     08-30    --------------------------------------------------------------------------------------    INS AND OUTS:  I&O's Detail    29 Aug 2022 07:01  -  30 Aug 2022 07:00  --------------------------------------------------------  IN:    Oral Fluid: 705 mL  Total IN: 705 mL    OUT:    Bulb (mL): 20 mL    Voided (mL): 950 mL  Total OUT: 970 mL    Total NET: -265 mL      30 Aug 2022 07:01  -  30 Aug 2022 11:47  --------------------------------------------------------  IN:  Total IN: 0 mL    OUT:    Voided (mL): 500 mL  Total OUT: 500 mL    Total NET: -500 mL    --------------------------------------------------------------------------------------

## 2022-08-30 NOTE — PRE-ANESTHESIA EVALUATION ADULT - NSANTHOSAYNRD_GEN_A_CORE
No. EDGAR screening performed.  STOP BANG Legend: 0-2 = LOW Risk; 3-4 = INTERMEDIATE Risk; 5-8 = HIGH Risk
No. EDGAR screening performed.  STOP BANG Legend: 0-2 = LOW Risk; 3-4 = INTERMEDIATE Risk; 5-8 = HIGH Risk

## 2022-08-30 NOTE — PRE-ANESTHESIA EVALUATION ADULT - NSANTHPMHFT_GEN_ALL_CORE
COVID+
67 y Male patient S/P exploratory laparotomy for pneumoperitoneum 2/2 duodenal perforation now with abscess, for IR drainage. HLD    ET>4 METS

## 2022-08-30 NOTE — PRE PROCEDURE NOTE - PRE PROCEDURE EVALUATION
Vascular & Interventional Radiology    HPI: 67y Male s/p repair of duodenal perforation with a multipart peripherally enhancing collection in the perihepatic space with a reference component measuring 13.7 cm anterior to the inferior left lobe. IR consulted for drainage. At the most recent CT there was also a small perirectal collection. However, after discussion with surgery, we will plan to only drain the upper abdominal collection for now.    Medications (Abx/Cardiac/Anticoagulation/Blood Products)  enoxaparin Injectable: 40 milliGRAM(s) SubCutaneous (08-28 @ 18:03)  piperacillin/tazobactam IVPB.: 200 mL/Hr IV Intermittent (08-29 @ 17:16)  piperacillin/tazobactam IVPB..: 25 mL/Hr IV Intermittent (08-30 @ 05:07)    Data:  T(C): 37.3  HR: 80  BP: 127/63  RR: 18  SpO2: 100%    Exam  Calm, resting comfortably, NAD, normal respirations.    -WBC 10.28 / HgB 11.7 / Hct 34.6 / Plt 432  -Na 128 / Cl 94 / BUN 8 / Glucose 102  -K 3.6 / CO2 22 / Cr 0.71  -INR1.28    Imaging: reviewed    Plan:   - 67y Male presents for abdominal collection drainage  - Informed consent obtained.  Vascular & Interventional Radiology    HPI: 67y Male s/p repair of duodenal perforation with a multipart peripherally enhancing collection in the perihepatic space with a reference component measuring 13.7 cm anterior to the inferior left lobe. IR consulted for drainage. At the most recent CT there was also a small perirectal collection. However, after discussion with surgery, we will plan to only drain the upper abdominal collection for now.    Medications (Abx/Cardiac/Anticoagulation/Blood Products)  enoxaparin Injectable: 40 milliGRAM(s) SubCutaneous (08-28 @ 18:03)  piperacillin/tazobactam IVPB.: 200 mL/Hr IV Intermittent (08-29 @ 17:16)  piperacillin/tazobactam IVPB..: 25 mL/Hr IV Intermittent (08-30 @ 05:07)    Data:  T(C): 37.3  HR: 80  BP: 127/63  RR: 18  SpO2: 100%    Exam  Calm, resting comfortably, NAD, normal respirations.    -WBC 10.28 / HgB 11.7 / Hct 34.6 / Plt 432  -Na 128 / Cl 94 / BUN 8 / Glucose 102  -K 3.6 / CO2 22 / Cr 0.71  -INR1.28    Imaging: reviewed    Plan:   - 67y Male presents for abdominal collection drainage. I spoke to Dr. Vazquez and he reports he wants the perihepatic fluid drained, not the perirectal fluid at this time.   - Informed consent obtained.

## 2022-08-31 LAB
ANION GAP SERPL CALC-SCNC: 10 MMOL/L — SIGNIFICANT CHANGE UP (ref 5–17)
BUN SERPL-MCNC: 11 MG/DL — SIGNIFICANT CHANGE UP (ref 7–23)
CALCIUM SERPL-MCNC: 7.8 MG/DL — LOW (ref 8.4–10.5)
CHLORIDE SERPL-SCNC: 97 MMOL/L — SIGNIFICANT CHANGE UP (ref 96–108)
CO2 SERPL-SCNC: 22 MMOL/L — SIGNIFICANT CHANGE UP (ref 22–31)
CREAT SERPL-MCNC: 0.71 MG/DL — SIGNIFICANT CHANGE UP (ref 0.5–1.3)
EGFR: 99 ML/MIN/1.73M2 — SIGNIFICANT CHANGE UP
GLUCOSE SERPL-MCNC: 113 MG/DL — HIGH (ref 70–99)
HCT VFR BLD CALC: 32.4 % — LOW (ref 39–50)
HGB BLD-MCNC: 11.1 G/DL — LOW (ref 13–17)
MAGNESIUM SERPL-MCNC: 2.3 MG/DL — SIGNIFICANT CHANGE UP (ref 1.6–2.6)
MCHC RBC-ENTMCNC: 31.5 PG — SIGNIFICANT CHANGE UP (ref 27–34)
MCHC RBC-ENTMCNC: 34.3 GM/DL — SIGNIFICANT CHANGE UP (ref 32–36)
MCV RBC AUTO: 92 FL — SIGNIFICANT CHANGE UP (ref 80–100)
NRBC # BLD: 0 /100 WBCS — SIGNIFICANT CHANGE UP (ref 0–0)
PHOSPHATE SERPL-MCNC: 2.1 MG/DL — LOW (ref 2.5–4.5)
PLATELET # BLD AUTO: 448 K/UL — HIGH (ref 150–400)
POTASSIUM SERPL-MCNC: 3.9 MMOL/L — SIGNIFICANT CHANGE UP (ref 3.5–5.3)
POTASSIUM SERPL-SCNC: 3.9 MMOL/L — SIGNIFICANT CHANGE UP (ref 3.5–5.3)
RBC # BLD: 3.52 M/UL — LOW (ref 4.2–5.8)
RBC # FLD: 12.6 % — SIGNIFICANT CHANGE UP (ref 10.3–14.5)
SODIUM SERPL-SCNC: 129 MMOL/L — LOW (ref 135–145)
WBC # BLD: 8.02 K/UL — SIGNIFICANT CHANGE UP (ref 3.8–10.5)
WBC # FLD AUTO: 8.02 K/UL — SIGNIFICANT CHANGE UP (ref 3.8–10.5)

## 2022-08-31 PROCEDURE — 99232 SBSQ HOSP IP/OBS MODERATE 35: CPT

## 2022-08-31 RX ORDER — SODIUM,POTASSIUM PHOSPHATES 278-250MG
1 POWDER IN PACKET (EA) ORAL ONCE
Refills: 0 | Status: COMPLETED | OUTPATIENT
Start: 2022-08-31 | End: 2022-08-31

## 2022-08-31 RX ADMIN — PIPERACILLIN AND TAZOBACTAM 25 GRAM(S): 4; .5 INJECTION, POWDER, LYOPHILIZED, FOR SOLUTION INTRAVENOUS at 23:52

## 2022-08-31 RX ADMIN — PANTOPRAZOLE SODIUM 40 MILLIGRAM(S): 20 TABLET, DELAYED RELEASE ORAL at 05:32

## 2022-08-31 RX ADMIN — PIPERACILLIN AND TAZOBACTAM 25 GRAM(S): 4; .5 INJECTION, POWDER, LYOPHILIZED, FOR SOLUTION INTRAVENOUS at 09:10

## 2022-08-31 RX ADMIN — PANTOPRAZOLE SODIUM 40 MILLIGRAM(S): 20 TABLET, DELAYED RELEASE ORAL at 17:29

## 2022-08-31 RX ADMIN — ENOXAPARIN SODIUM 40 MILLIGRAM(S): 100 INJECTION SUBCUTANEOUS at 16:23

## 2022-08-31 RX ADMIN — PIPERACILLIN AND TAZOBACTAM 25 GRAM(S): 4; .5 INJECTION, POWDER, LYOPHILIZED, FOR SOLUTION INTRAVENOUS at 16:21

## 2022-08-31 RX ADMIN — Medication 1 PACKET(S): at 09:11

## 2022-08-31 RX ADMIN — PIPERACILLIN AND TAZOBACTAM 25 GRAM(S): 4; .5 INJECTION, POWDER, LYOPHILIZED, FOR SOLUTION INTRAVENOUS at 00:23

## 2022-08-31 NOTE — PROVIDER CONTACT NOTE (OTHER) - DATE AND TIME:
21-Aug-2022 11:45
29-Aug-2022 21:15
20-Aug-2022 01:14
23-Aug-2022 00:16
26-Aug-2022 21:10
29-Aug-2022 13:27
30-Aug-2022 06:24
31-Aug-2022 20:29
22-Aug-2022 19:30
26-Aug-2022 01:40
30-Aug-2022 22:25
30-Aug-2022 20:32

## 2022-08-31 NOTE — PROVIDER CONTACT NOTE (OTHER) - REASON
Abd distended/ NGT flush 50 cc not returning/ erythema around midline staples/ Pt c/o HA
Discontinuation of Isolation for COVID patient
Patient having several yellow mucous bowel moments every hour.
Patient with a fever of 100.6 F.
Spouse of patient concerned that patient has bowel movements with scants of blood.
Remove patient tsang
Patient has light-red NGT drainage noted on assessment
Wife of patient wish to speak to team regarding IR procedure.
Family member of patient concerned over reddened old IV site.
Patient with loose and bloody bowel movement.
Pt stated does not want to try first out of bed tonight
Patient with fever of 101.1 F.

## 2022-08-31 NOTE — PROVIDER CONTACT NOTE (OTHER) - BACKGROUND
Patient admitted for nontraumatic perforation of the intestine
Pt admitted for nontraumatic perforation of intestine s/p ex-lap and duodenal perforation repair.
Patient admitted for pneumoperitoneum.
s/p closure of duodenal ulcer. Emesis today x1 @ 16:00 100 ml green output
Patient admitted for pneumoperitoneum.

## 2022-08-31 NOTE — PROGRESS NOTE ADULT - ASSESSMENT
Assessment:  67 y Male patient S/P exploratory laparotomy for pneumoperitoneum 2/2 duodenal perforation with new found evidence of perihepatic enhancing mass (likely abscess), s/p IR drainage (8/30).    Plan:  - Pain control (oxycodone 5/10mg IR when patient tolerating reg diet)  - C diff negative, will start sitz bath  - Protonix BID  - Continue to replete electrolytes  - NPO for IR procedure today, can restart diet after patient has had procedure  - DVT ppx held for IR, can restart 6hrs after end of procedure    ACS  [id number]   Assessment:  67 y Male patient S/P exploratory laparotomy for pneumoperitoneum 2/2 duodenal perforation with new found evidence of perihepatic enhancing mass (likely abscess), s/p IR drainage (8/30).    Plan:  - Pain control (oxycodone 5/10mg IR when patient tolerating reg diet)  - Protonix BID  - Continue to replete electrolytes  - Continue zosyn  - DVT ppx  - OOB    ACS  [id number]   Assessment:  67 y Male patient S/P exploratory laparotomy for pneumoperitoneum 2/2 duodenal perforation with new found evidence of perihepatic enhancing mass (likely abscess), s/p IR drainage (8/30).    Plan:  - Pain control (oxycodone 5/10mg IR when patient tolerating reg diet)  - Flush IR drain w/ 5cc daily  - Protonix BID  - Continue to replete electrolytes  - Continue zosyn, monitor for clinical signs of infection  - DVT ppx  - OOB    ACS  [id number]

## 2022-08-31 NOTE — PROVIDER CONTACT NOTE (OTHER) - SITUATION
Patient with a fever of 100.6 F.
Wife of patient wish to speak to team regarding IR procedure.
Family member of patient concerned over reddened old IV site.
Patient diagnosed with Covid >10 days ago. Asymptomatic. No further isolation required.
Abd distended/ NGT flush 50cc  not returning/ erythema around midline staples/ Pt c/o HA
Spouse of patient concerned that patient has bowel movements with scants of blood.
Patient has light-red NGT drainage noted on assessment
Patient with fever of 101.1 F.
Pt stated does not want to attempt first out of bed tonight.
Patient having several yellow mucous bowel moments every hour.
Patient with loose and bloody bowel movement.

## 2022-08-31 NOTE — PROGRESS NOTE ADULT - SUBJECTIVE AND OBJECTIVE BOX
Surgery Progress Note    Overnight Events: Febrile to 101.1, HR 70s-80s. Patient had one episode of low volume, loose bowel movement w/ reported mild rectal pain. Hx of hemorrhoids, has never had bleeding episode before.    SUBJECTIVE: Patient seen and examined at bedside with surgical team.    OBJECTIVE:    Vital Signs Last 24 Hrs  T(C): 37.3 (31 Aug 2022 00:41), Max: 38.4 (30 Aug 2022 20:11)  T(F): 99.1 (31 Aug 2022 00:41), Max: 101.1 (30 Aug 2022 20:11)  HR: 74 (31 Aug 2022 00:41) (69 - 85)  BP: 114/57 (31 Aug 2022 00:41) (107/69 - 137/71)  BP(mean): 88 (30 Aug 2022 13:00) (85 - 99)  RR: 18 (31 Aug 2022 00:41) (12 - 18)  SpO2: 97% (31 Aug 2022 00:41) (96% - 100%)    Parameters below as of 31 Aug 2022 00:41  Patient On (Oxygen Delivery Method): room air    I&O's Detail    29 Aug 2022 07:01  -  30 Aug 2022 07:00  --------------------------------------------------------  IN:    Oral Fluid: 705 mL  Total IN: 705 mL    OUT:    Bulb (mL): 20 mL    Voided (mL): 950 mL  Total OUT: 970 mL    Total NET: -265 mL      30 Aug 2022 07:01  -  31 Aug 2022 00:56  --------------------------------------------------------  IN:  Total IN: 0 mL    OUT:    Bulb (mL): 10 mL    Drain (mL): 50 mL    Voided (mL): 500 mL  Total OUT: 560 mL    Total NET: -560 mL      MEDICATIONS  (STANDING):  enoxaparin Injectable 40 milliGRAM(s) SubCutaneous every 24 hours  pantoprazole  Injectable 40 milliGRAM(s) IV Push two times a day  piperacillin/tazobactam IVPB.. 3.375 Gram(s) IV Intermittent every 8 hours    MEDICATIONS  (PRN):  acetaminophen     Tablet .. 650 milliGRAM(s) Oral every 6 hours PRN Temp greater or equal to 38C (100.4F), Mild Pain (1 - 3)  oxyCODONE    IR 5 milliGRAM(s) Oral every 4 hours PRN Moderate Pain (4 - 6)  oxyCODONE    IR 10 milliGRAM(s) Oral every 4 hours PRN Severe Pain (7 - 10)      PHYSICAL EXAM:  Constitutional: A&Ox3, NAD  Respiratory: Unlabored breathing  Abdomen: Soft, nondistended, nontender. DINAH draining serosanginous fluid.   Extremities: WWP, LOREDO spontaneously    LABS:                        11.7   10.28 )-----------( 432      ( 30 Aug 2022 08:27 )             34.6     08-30    128<L>  |  94<L>  |  8   ----------------------------<  102<H>  3.6   |  22  |  0.71    Ca    8.0<L>      30 Aug 2022 08:20  Phos  2.3     08-30  Mg     2.1     08-30      PT/INR - ( 30 Aug 2022 08:27 )   PT: 14.8 sec;   INR: 1.28 ratio         PTT - ( 30 Aug 2022 08:27 )  PTT:38.6 sec      ABO Interpretation: RAMANA (08-30-22 @ 06:56)       Surgery Progress Note    Overnight Events: Febrile to 101.1, HR 70s-80s. Patient had one episode of low volume, loose bowel movement w/ reported mild rectal pain. Hx of hemorrhoids, has never had bleeding episode before.    SUBJECTIVE: Patient seen and examined at bedside with surgical team. Patient is doing well today. Tolerating regular diet, +/+, no nausea/vomiting. Patients pain is well controlled.    OBJECTIVE:    Vital Signs Last 24 Hrs  T(C): 37.3 (31 Aug 2022 00:41), Max: 38.4 (30 Aug 2022 20:11)  T(F): 99.1 (31 Aug 2022 00:41), Max: 101.1 (30 Aug 2022 20:11)  HR: 74 (31 Aug 2022 00:41) (69 - 85)  BP: 114/57 (31 Aug 2022 00:41) (107/69 - 137/71)  BP(mean): 88 (30 Aug 2022 13:00) (85 - 99)  RR: 18 (31 Aug 2022 00:41) (12 - 18)  SpO2: 97% (31 Aug 2022 00:41) (96% - 100%)    Parameters below as of 31 Aug 2022 00:41  Patient On (Oxygen Delivery Method): room air    I&O's Detail    29 Aug 2022 07:01  -  30 Aug 2022 07:00  --------------------------------------------------------  IN:    Oral Fluid: 705 mL  Total IN: 705 mL    OUT:    Bulb (mL): 20 mL    Voided (mL): 950 mL  Total OUT: 970 mL    Total NET: -265 mL      30 Aug 2022 07:01  -  31 Aug 2022 00:56  --------------------------------------------------------  IN:  Total IN: 0 mL    OUT:    Bulb (mL): 10 mL    Drain (mL): 50 mL    Voided (mL): 500 mL  Total OUT: 560 mL    Total NET: -560 mL      MEDICATIONS  (STANDING):  enoxaparin Injectable 40 milliGRAM(s) SubCutaneous every 24 hours  pantoprazole  Injectable 40 milliGRAM(s) IV Push two times a day  piperacillin/tazobactam IVPB.. 3.375 Gram(s) IV Intermittent every 8 hours    MEDICATIONS  (PRN):  acetaminophen     Tablet .. 650 milliGRAM(s) Oral every 6 hours PRN Temp greater or equal to 38C (100.4F), Mild Pain (1 - 3)  oxyCODONE    IR 5 milliGRAM(s) Oral every 4 hours PRN Moderate Pain (4 - 6)  oxyCODONE    IR 10 milliGRAM(s) Oral every 4 hours PRN Severe Pain (7 - 10)      PHYSICAL EXAM:  Constitutional: A&Ox3, NAD  Respiratory: Unlabored breathing  Abdomen: Soft, nondistended, nontender. DINAH draining purulent brown fluid. IR drain draining purulent brown fluid. Lower incision has slight brown drainage, incision edges are less erythematous   Extremities: WWP, LOREDO spontaneously    LABS:                        11.7   10.28 )-----------( 432      ( 30 Aug 2022 08:27 )             34.6     08-30    128<L>  |  94<L>  |  8   ----------------------------<  102<H>  3.6   |  22  |  0.71    Ca    8.0<L>      30 Aug 2022 08:20  Phos  2.3     08-30  Mg     2.1     08-30      PT/INR - ( 30 Aug 2022 08:27 )   PT: 14.8 sec;   INR: 1.28 ratio         PTT - ( 30 Aug 2022 08:27 )  PTT:38.6 sec      ABO Interpretation: A (08-30-22 @ 06:56)

## 2022-08-31 NOTE — PROVIDER CONTACT NOTE (OTHER) - NAME OF MD/NP/PA/DO NOTIFIED:
MD Davidson
MD Melany Davis.
Melany Davis MD
MD Melany Davis
Unit leadership and bed board.
Dr. Davidson
Dr. Tripp
MD Davidson
MD Gonsales
Shawnee Warner
Shawnee Zaman
Melany Davis

## 2022-08-31 NOTE — PROVIDER CONTACT NOTE (OTHER) - RECOMMENDATIONS
MD obregon.
MD obregon.
Team notified.
Md obregon.
KENDRA Mallory aware and notified. KENDRA Mallory aware and notified and NGT drainage.
MD obregon.
MD obregon.

## 2022-08-31 NOTE — PROVIDER CONTACT NOTE (OTHER) - ASSESSMENT
Family member of patient concerned over reddened old IV site. Per ANGELA Roman,  was utilized and patient denied being in pain. Cold packs provided at bedside.
Patient with fever of 101.1 F. Patient is asymptomatic and denies being in distress. Cold packs provided.
Pt resting comfortably in bed. NGT and RUQ DINAH drain in place. Chand in place. Patient stated he is comfortable at the moment and does not want to attempt first out of bed.
On assessment, NGT drainage was light-red color. NGT was flushed and 10cc of air was added, suction was lowered.
Increased Abd distention / NGT flush 50cc  not returning/ erythema around midline staples/ Pt c/o HA
Patient having several yellow mucous bowel moments every hour. Patient's skin on sacrum very irritated.
Patient with loose and bloody bowel movement. Patient states he feels irritated from frequent bowel movements. Patient not actively bleeding. Patient denies being in distress.
Wife of patient wish to speak to team regarding IR procedure. When utilizing , wife states that she does not understand reason for procedure.
Patient with a fever of 100.6 F. Patient with family member at bedside- denies being in distress or feeling of a fever. Patient denies being in distress. Patient laying safely in bed.
Spouse of patient concerned that patient has bowel movements with scants of blood. Patient has a history of hemorrhoids. Patient denies being in distress, patient not actively bleeding.

## 2022-08-31 NOTE — PROGRESS NOTE ADULT - SUBJECTIVE AND OBJECTIVE BOX
Interventional Radiology Follow-Up Note.     Patient seen and examined @ bedside around 7am.     This is a 67y Male s/p abdominal collection drainage on 8/30 in Interventional Radiology with Dr. Gongora.   Febrile overnight. Reports mild abdominal pain.       Medication:     enoxaparin Injectable: (08-30)  piperacillin/tazobactam IVPB.: (08-29)  piperacillin/tazobactam IVPB..: (08-31)    Vitals:   T(F): 99.7, Max: 101.1 (20:11)  HR: 78  BP: 115/66  RR: 19  SpO2: 95%    Physical Exam:  General: Nontoxic, in NAD.  Abdomen: soft, NTND.   Drain Device: Drain intact attached to Cloudy fluid.    Dressing clean, dry, intact.   24hr Drain output: 55cc.    LABS:                        11.1   8.02  )-----------( 448      ( 31 Aug 2022 06:49 )             32.4       08-31    129<L>  |  97  |  11  ----------------------------<  113<H>  3.9   |  22  |  0.71    Ca    7.8<L>      31 Aug 2022 06:49  Phos  2.1     08-31  Mg     2.3     08-31                    PT/INR - ( 30 Aug 2022 08:27 )   PT: 14.8 sec;   INR: 1.28 ratio         PTT - ( 30 Aug 2022 08:27 )  PTT:38.6 sec        Assessment/Plan: 67 y Male patient S/P exploratory laparotomy for pneumoperitoneum 2/2 duodenal perforation with new found evidence of perihepatic enhancing mass (likely abscess), s/p IR drainage (8/30).    - Febrile overnight. On zosyn.  - culture Pending.   - Flush drain with 5cc NS daily forward only; DO NOT aspirate  - Change dressing q3 days or when dressing is saturated.  -  Monitor h/h; transfuse as needed  - Trend vs/labs  - Continue global management per primary team  - If the patient is d/c home with drainage catheter, pt can make an appointment with IR by calling the IR booking office at [phone]; recommend IR follow in 1- 2 weeks for tube evaluation.  - Pt will benefit from VNS service to help with drainage catheter care; Pt should continue same drainage catheter care as an outpatient.  - Greater than 50% of the encounter was spent counseling and/ or coordination of care on drain care and follow up       Please call IR  [phone] with any questions, concerns, or issues regarding above.    Also available on Teams.

## 2022-09-01 LAB
-  AMIKACIN: SIGNIFICANT CHANGE UP
-  AMOXICILLIN/CLAVULANIC ACID: SIGNIFICANT CHANGE UP
-  AMPICILLIN/SULBACTAM: SIGNIFICANT CHANGE UP
-  AMPICILLIN: SIGNIFICANT CHANGE UP
-  AZTREONAM: SIGNIFICANT CHANGE UP
-  CEFAZOLIN: SIGNIFICANT CHANGE UP
-  CEFEPIME: SIGNIFICANT CHANGE UP
-  CEFOXITIN: SIGNIFICANT CHANGE UP
-  CEFTRIAXONE: SIGNIFICANT CHANGE UP
-  CIPROFLOXACIN: SIGNIFICANT CHANGE UP
-  ERTAPENEM: SIGNIFICANT CHANGE UP
-  GENTAMICIN: SIGNIFICANT CHANGE UP
-  IMIPENEM: SIGNIFICANT CHANGE UP
-  LEVOFLOXACIN: SIGNIFICANT CHANGE UP
-  MEROPENEM: SIGNIFICANT CHANGE UP
-  PIPERACILLIN/TAZOBACTAM: SIGNIFICANT CHANGE UP
-  TOBRAMYCIN: SIGNIFICANT CHANGE UP
-  TRIMETHOPRIM/SULFAMETHOXAZOLE: SIGNIFICANT CHANGE UP
ANION GAP SERPL CALC-SCNC: 10 MMOL/L — SIGNIFICANT CHANGE UP (ref 5–17)
BUN SERPL-MCNC: 11 MG/DL — SIGNIFICANT CHANGE UP (ref 7–23)
CALCIUM SERPL-MCNC: 7.9 MG/DL — LOW (ref 8.4–10.5)
CHLORIDE SERPL-SCNC: 100 MMOL/L — SIGNIFICANT CHANGE UP (ref 96–108)
CO2 SERPL-SCNC: 21 MMOL/L — LOW (ref 22–31)
CREAT SERPL-MCNC: 0.75 MG/DL — SIGNIFICANT CHANGE UP (ref 0.5–1.3)
EGFR: 98 ML/MIN/1.73M2 — SIGNIFICANT CHANGE UP
GLUCOSE SERPL-MCNC: 134 MG/DL — HIGH (ref 70–99)
HCT VFR BLD CALC: 32.4 % — LOW (ref 39–50)
HGB BLD-MCNC: 11.2 G/DL — LOW (ref 13–17)
MAGNESIUM SERPL-MCNC: 2.3 MG/DL — SIGNIFICANT CHANGE UP (ref 1.6–2.6)
MCHC RBC-ENTMCNC: 31.3 PG — SIGNIFICANT CHANGE UP (ref 27–34)
MCHC RBC-ENTMCNC: 34.6 GM/DL — SIGNIFICANT CHANGE UP (ref 32–36)
MCV RBC AUTO: 90.5 FL — SIGNIFICANT CHANGE UP (ref 80–100)
METHOD TYPE: SIGNIFICANT CHANGE UP
NRBC # BLD: 0 /100 WBCS — SIGNIFICANT CHANGE UP (ref 0–0)
PHOSPHATE SERPL-MCNC: 1.8 MG/DL — LOW (ref 2.5–4.5)
PLATELET # BLD AUTO: 499 K/UL — HIGH (ref 150–400)
POTASSIUM SERPL-MCNC: 3.7 MMOL/L — SIGNIFICANT CHANGE UP (ref 3.5–5.3)
POTASSIUM SERPL-SCNC: 3.7 MMOL/L — SIGNIFICANT CHANGE UP (ref 3.5–5.3)
RBC # BLD: 3.58 M/UL — LOW (ref 4.2–5.8)
RBC # FLD: 12.6 % — SIGNIFICANT CHANGE UP (ref 10.3–14.5)
SODIUM SERPL-SCNC: 131 MMOL/L — LOW (ref 135–145)
WBC # BLD: 6.05 K/UL — SIGNIFICANT CHANGE UP (ref 3.8–10.5)
WBC # FLD AUTO: 6.05 K/UL — SIGNIFICANT CHANGE UP (ref 3.8–10.5)

## 2022-09-01 PROCEDURE — 99232 SBSQ HOSP IP/OBS MODERATE 35: CPT

## 2022-09-01 RX ORDER — ERTAPENEM SODIUM 1 G/1
1000 INJECTION, POWDER, LYOPHILIZED, FOR SOLUTION INTRAMUSCULAR; INTRAVENOUS ONCE
Refills: 0 | Status: COMPLETED | OUTPATIENT
Start: 2022-09-01 | End: 2022-09-01

## 2022-09-01 RX ORDER — ERTAPENEM SODIUM 1 G/1
1000 INJECTION, POWDER, LYOPHILIZED, FOR SOLUTION INTRAMUSCULAR; INTRAVENOUS EVERY 24 HOURS
Refills: 0 | Status: COMPLETED | OUTPATIENT
Start: 2022-09-02 | End: 2022-09-08

## 2022-09-01 RX ORDER — ERTAPENEM SODIUM 1 G/1
INJECTION, POWDER, LYOPHILIZED, FOR SOLUTION INTRAMUSCULAR; INTRAVENOUS
Refills: 0 | Status: COMPLETED | OUTPATIENT
Start: 2022-09-01 | End: 2022-09-09

## 2022-09-01 RX ORDER — PHENYLEPHRINE-SHARK LIVER OIL-MINERAL OIL-PETROLATUM RECTAL OINTMENT
1 OINTMENT (GRAM) RECTAL
Refills: 0 | Status: DISCONTINUED | OUTPATIENT
Start: 2022-09-01 | End: 2022-09-08

## 2022-09-01 RX ADMIN — PANTOPRAZOLE SODIUM 40 MILLIGRAM(S): 20 TABLET, DELAYED RELEASE ORAL at 17:10

## 2022-09-01 RX ADMIN — PIPERACILLIN AND TAZOBACTAM 25 GRAM(S): 4; .5 INJECTION, POWDER, LYOPHILIZED, FOR SOLUTION INTRAVENOUS at 07:44

## 2022-09-01 RX ADMIN — PHENYLEPHRINE-SHARK LIVER OIL-MINERAL OIL-PETROLATUM RECTAL OINTMENT 1 APPLICATION(S): at 17:10

## 2022-09-01 RX ADMIN — ERTAPENEM SODIUM 120 MILLIGRAM(S): 1 INJECTION, POWDER, LYOPHILIZED, FOR SOLUTION INTRAMUSCULAR; INTRAVENOUS at 11:13

## 2022-09-01 RX ADMIN — PANTOPRAZOLE SODIUM 40 MILLIGRAM(S): 20 TABLET, DELAYED RELEASE ORAL at 06:10

## 2022-09-01 RX ADMIN — ENOXAPARIN SODIUM 40 MILLIGRAM(S): 100 INJECTION SUBCUTANEOUS at 17:12

## 2022-09-01 RX ADMIN — Medication 85 MILLIMOLE(S): at 11:57

## 2022-09-01 NOTE — PROGRESS NOTE ADULT - SUBJECTIVE AND OBJECTIVE BOX
SURGERY DAILY PROGRESS NOTE:     : [id number] Keira   SUBJECTIVE/ROS: Patient seen and evaluated on AM rounds. Patient reports he is feeling better. Has been ambulating however was limited yesterday due to pain. Tolerating a diet. Reports some anal pain from his hemorrhoids. No longer having diarrhea.   Denies nausea, vomiting, chest pain, shortness of breath.        OBJECTIVE:    Vital Signs Last 24 Hrs  T(C): 37.6 (01 Sep 2022 05:30), Max: 37.9 (31 Aug 2022 09:18)  T(F): 99.6 (01 Sep 2022 05:30), Max: 100.3 (31 Aug 2022 09:18)  HR: 74 (01 Sep 2022 05:30) (74 - 108)  BP: 118/61 (01 Sep 2022 05:30) (108/60 - 126/69)  BP(mean): --  RR: 18 (01 Sep 2022 05:30) (18 - 18)  SpO2: 98% (01 Sep 2022 05:30) (95% - 98%)    Parameters below as of 01 Sep 2022 05:30  Patient On (Oxygen Delivery Method): room air      I&O's Detail    31 Aug 2022 07:01  -  01 Sep 2022 07:00  --------------------------------------------------------  IN:    Oral Fluid: 555 mL  Total IN: 555 mL    OUT:    Bulb (mL): 5 mL    Drain (mL): 65 mL    Stool (mL): 1 mL  Total OUT: 71 mL    Total NET: 484 mL        Daily     Daily   MEDICATIONS  (STANDING):  enoxaparin Injectable 40 milliGRAM(s) SubCutaneous every 24 hours  pantoprazole  Injectable 40 milliGRAM(s) IV Push two times a day  piperacillin/tazobactam IVPB.. 3.375 Gram(s) IV Intermittent every 8 hours    MEDICATIONS  (PRN):  acetaminophen     Tablet .. 650 milliGRAM(s) Oral every 6 hours PRN Temp greater or equal to 38C (100.4F), Mild Pain (1 - 3)  oxyCODONE    IR 5 milliGRAM(s) Oral every 4 hours PRN Moderate Pain (4 - 6)  oxyCODONE    IR 10 milliGRAM(s) Oral every 4 hours PRN Severe Pain (7 - 10)      LABS:                        11.2   6.05  )-----------( 499      ( 01 Sep 2022 06:43 )             32.4     09-01    131<L>  |  100  |  11  ----------------------------<  134<H>  3.7   |  21<L>  |  0.75    Ca    7.9<L>      01 Sep 2022 06:45  Phos  1.8     09-01  Mg     2.3     09-01      PT/INR - ( 30 Aug 2022 08:27 )   PT: 14.8 sec;   INR: 1.28 ratio         PTT - ( 30 Aug 2022 08:27 )  PTT:38.6 sec        PHYSICAL EXAM:  Constitutional: A&Ox3, NAD  Respiratory: Unlabored breathing  Abdomen: Soft, nondistended, nontender. DINAH draining purulent brown fluid. IR drain draining purulent brown fluid. Lower incision has slight brown drainage, incision edges are less erythematous   Extremities: WWP, LOREDO spontaneously

## 2022-09-01 NOTE — PROGRESS NOTE ADULT - SUBJECTIVE AND OBJECTIVE BOX
Interventional Radiology Follow-Up Note    This is a 67y Male s/p abdominal abscess drainage on 8/30 in Interventional Radiology with Dr. Gongora.     S:     Medication:     enoxaparin Injectable: (08-31)  piperacillin/tazobactam IVPB..: (08-31)    Vitals:   T(F): 99.6, Max: 100.3 (09:18)  HR: 74  BP: 118/61  RR: 18  SpO2: 98%    Physical Exam:  General:   Abdomen:   Drain Device: Drain intact attached to ____. Dressing clean, dry, intact.    LABS:  WBC 8.02 / Hgb 11.1 / Hct 32.4 / Plt 448  Na 129 / K 3.9 / CO2 22 / Cl 97 / BUN 11 / Cr 0.71 / Glucose 113  ALT -- / AST -- / Alk Phos -- / Tbili --  Ptt -- / Pt -- / INR --    Preliminary Report:    Moderate Gram Negative Rods    24hr Drain output: 60cc    Assessment/Plan:  67 y Male patient S/P exploratory laparotomy for pneumoperitoneum 2/2 duodenal perforation with new found evidence of perihepatic enhancing mass (likely abscess), s/p IR drainage (8/30).    - Febrile overnight. On zosyn.  - culture Pending.   - Flush drain with 5cc NS daily forward only; DO NOT aspirate  - Change dressing q3 days or when dressing is saturated.  -  Monitor h/h; transfuse as needed  - Trend vs/labs  - Continue global management per primary team  - If the patient is d/c home with drainage catheter, pt can make an appointment with IR by calling the IR booking office at [phone]; recommend IR follow in 1- 2 weeks for tube evaluation.  - Pt will benefit from VNS service to help with drainage catheter care; Pt should continue same drainage catheter care as an outpatient.  - Greater than 50% of the encounter was spent counseling and/ or coordination of care on drain care and follow up    Fara Lomeli PA-C  Available on teams    **Note in progress** Interventional Radiology Follow-Up Note    This is a 67y Male s/p abdominal abscess drainage on 8/30 in Interventional Radiology with Dr. Gongora.     S: Patient seen and examined at bedside. Lookery phone  provided ID #[id number]. Patient reports pain over midline incision with coughing, otherwise offers no further complaints.     Medication:     enoxaparin Injectable: (08-31)  piperacillin/tazobactam IVPB..: (08-31)    Vitals:   T(F): 99.6, Max: 100.3 (09:18)  HR: 74  BP: 118/61  RR: 18  SpO2: 98%    Physical Exam:  General: NAd, A&Ox3  Abdomen: ND, soft, mild ttp over midline. midline incision dressing c/d/i. surgical DINAH and IR drain in place both with tan fluid.   Drain Device: IR Drain intact attached to bag. Dressing clean, dry, intact.    LABS:  WBC 8.02 / Hgb 11.1 / Hct 32.4 / Plt 448  Na 129 / K 3.9 / CO2 22 / Cl 97 / BUN 11 / Cr 0.71 / Glucose 113  ALT -- / AST -- / Alk Phos -- / Tbili --  Ptt -- / Pt -- / INR --    Preliminary Report:    Moderate Gram Negative Rods    24hr Drain output: 60cc    Assessment/Plan:  67 y Male patient S/P exploratory laparotomy for pneumoperitoneum 2/2 duodenal perforation with new found evidence of perihepatic enhancing mass (likely abscess), s/p IR drainage (8/30).    - Tmax 100.3, On zosyn.  - f/u sensitivities.   - Flush drain with 5cc NS daily forward only; DO NOT aspirate  - Change dressing q3 days or when dressing is saturated.  -  Monitor h/h; transfuse as needed  - Trend vs/labs  - Continue global management per primary team  - If the patient is d/c home with drainage catheter, pt can make an appointment with IR by calling the IR booking office at [phone]; recommend IR follow in 7-10 days for tube evaluation.  - Pt will benefit from VNS service to help with drainage catheter care; Pt should continue same drainage catheter care as an outpatient.  - Greater than 50% of the encounter was spent counseling and/ or coordination of care on drain care and follow up    Fara Lomeli PA-C  Available on teams

## 2022-09-01 NOTE — CHART NOTE - NSCHARTNOTEFT_GEN_A_CORE
Nutrition Follow Up Note  Patient seen for: malnutrition follow-up    Chart reviewed, events noted. This is a "67 y Male patient S/P exploratory laparotomy for pneumoperitoneum 2/2 duodenal perforation with new found evidence of perihepatic enhancing mass (likely abscess), s/p IR drainage (8/30)."    Source: [x Patient       [x] EMR        [] RN        [x] Family at bedside -wife at bedside       [] Other:  [place] :  [id number]    Diet Order:   Diet, Regular (08-28-22)    - Is current order appropriate/adequate? [x] Yes  []  No:     - PO intake :   [x] >75%  Adequate    [x] 50-75%  Fair       [] <50%  Poor    - Nutrition-related concerns:  -pt seen with wife at bedside. Pt currently on regular diet.   -Pt reports good PO intake, consuming >75% of meals.   - Discussed with patient importance of PO intake and prioritizing sources of protein to maintain lean body mass.  Pt reports enjoying promote shakes in the past, interested in receiving nutrition shake to supplement PO intake.     GI:  Last BM 9/1___.   Bowel Regimen? [] Yes   [x] No--pt noted with diarrhea, improving --c-difficile negative       Weights: no new weights   61.7Kg (8/30)    Nutritionally Pertinent MEDICATIONS  (STANDING):  ertapenem  IVPB  pantoprazole  Injectable    Pertinent Labs: 09-01 @ 06:45: Na 131<L>, BUN 11, Cr 0.75, <H>, K+ 3.7, Phos 1.8<L>, Mg 2.3, Alk Phos --, ALT/SGPT --, AST/SGOT --, HbA1c --    Skin per nursing documentation: free of pressure injuries per nursing flow sheets   Edema: none     Estimated Needs:   [x] no change since previous assessment  [] recalculated:     Previous Nutrition Diagnosis: Malnutrition (severe, acute)  Nutrition Diagnosis is: [x] ongoing  [] resolved [] not applicable     New Nutrition Diagnosis: [x] Not applicable    Nutrition Care Plan:  [x] In Progress  [] Achieved  [] Not applicable    Nutrition Interventions:     Education Provided:       [] Yes:  [x] No:        Recommendations:         [x] Continue current diet order as tolerated.      [x] Add oral nutrition supplement: Ensure Enlive 1x daily      [x] RD to remain available and follow-up as medically appropriate.     Monitoring and Evaluation:   Continue to monitor nutritional intake, tolerance to diet prescription, weights, labs, skin integrity      RD remains available upon request and will follow up per protocol  Lydia Hodges RD, CDN, Pager # [phone]

## 2022-09-01 NOTE — PROGRESS NOTE ADULT - ASSESSMENT
67 y Male patient S/P exploratory laparotomy for pneumoperitoneum 2/2 duodenal perforation with new found evidence of perihepatic enhancing mass (likely abscess), s/p IR drainage (8/30).    Plan:  - Pain control (oxycodone 5/10mg IR when patient tolerating reg diet)  - Flush IR drain w/ 5cc daily  - Protonix BID  - Continue to replete electrolytes  - Continue zosyn, monitor for clinical signs of infection  - DVT ppx  - OOB  - preparations H for hemorrhoid pain     ACS  [id number]

## 2022-09-02 LAB
ANION GAP SERPL CALC-SCNC: 9 MMOL/L — SIGNIFICANT CHANGE UP (ref 5–17)
BUN SERPL-MCNC: 10 MG/DL — SIGNIFICANT CHANGE UP (ref 7–23)
CALCIUM SERPL-MCNC: 8.2 MG/DL — LOW (ref 8.4–10.5)
CHLORIDE SERPL-SCNC: 97 MMOL/L — SIGNIFICANT CHANGE UP (ref 96–108)
CO2 SERPL-SCNC: 23 MMOL/L — SIGNIFICANT CHANGE UP (ref 22–31)
CREAT SERPL-MCNC: 0.74 MG/DL — SIGNIFICANT CHANGE UP (ref 0.5–1.3)
EGFR: 98 ML/MIN/1.73M2 — SIGNIFICANT CHANGE UP
GLUCOSE SERPL-MCNC: 109 MG/DL — HIGH (ref 70–99)
HCT VFR BLD CALC: 35.3 % — LOW (ref 39–50)
HGB BLD-MCNC: 11.6 G/DL — LOW (ref 13–17)
MAGNESIUM SERPL-MCNC: 2.3 MG/DL — SIGNIFICANT CHANGE UP (ref 1.6–2.6)
MCHC RBC-ENTMCNC: 29.7 PG — SIGNIFICANT CHANGE UP (ref 27–34)
MCHC RBC-ENTMCNC: 32.9 GM/DL — SIGNIFICANT CHANGE UP (ref 32–36)
MCV RBC AUTO: 90.5 FL — SIGNIFICANT CHANGE UP (ref 80–100)
NRBC # BLD: 0 /100 WBCS — SIGNIFICANT CHANGE UP (ref 0–0)
PHOSPHATE SERPL-MCNC: 2.2 MG/DL — LOW (ref 2.5–4.5)
PLATELET # BLD AUTO: 529 K/UL — HIGH (ref 150–400)
POTASSIUM SERPL-MCNC: 4.2 MMOL/L — SIGNIFICANT CHANGE UP (ref 3.5–5.3)
POTASSIUM SERPL-SCNC: 4.2 MMOL/L — SIGNIFICANT CHANGE UP (ref 3.5–5.3)
RBC # BLD: 3.9 M/UL — LOW (ref 4.2–5.8)
RBC # FLD: 12.6 % — SIGNIFICANT CHANGE UP (ref 10.3–14.5)
SODIUM SERPL-SCNC: 129 MMOL/L — LOW (ref 135–145)
WBC # BLD: 6.79 K/UL — SIGNIFICANT CHANGE UP (ref 3.8–10.5)
WBC # FLD AUTO: 6.79 K/UL — SIGNIFICANT CHANGE UP (ref 3.8–10.5)

## 2022-09-02 PROCEDURE — 71260 CT THORAX DX C+: CPT | Mod: 26

## 2022-09-02 PROCEDURE — 74177 CT ABD & PELVIS W/CONTRAST: CPT | Mod: 26

## 2022-09-02 PROCEDURE — 99231 SBSQ HOSP IP/OBS SF/LOW 25: CPT

## 2022-09-02 RX ADMIN — PANTOPRAZOLE SODIUM 40 MILLIGRAM(S): 20 TABLET, DELAYED RELEASE ORAL at 06:20

## 2022-09-02 RX ADMIN — Medication 650 MILLIGRAM(S): at 18:09

## 2022-09-02 RX ADMIN — ENOXAPARIN SODIUM 40 MILLIGRAM(S): 100 INJECTION SUBCUTANEOUS at 18:08

## 2022-09-02 RX ADMIN — PANTOPRAZOLE SODIUM 40 MILLIGRAM(S): 20 TABLET, DELAYED RELEASE ORAL at 18:08

## 2022-09-02 RX ADMIN — ERTAPENEM SODIUM 120 MILLIGRAM(S): 1 INJECTION, POWDER, LYOPHILIZED, FOR SOLUTION INTRAMUSCULAR; INTRAVENOUS at 10:29

## 2022-09-02 RX ADMIN — Medication 650 MILLIGRAM(S): at 18:14

## 2022-09-02 RX ADMIN — PHENYLEPHRINE-SHARK LIVER OIL-MINERAL OIL-PETROLATUM RECTAL OINTMENT 1 APPLICATION(S): at 18:08

## 2022-09-02 RX ADMIN — Medication 85 MILLIMOLE(S): at 11:50

## 2022-09-02 RX ADMIN — PHENYLEPHRINE-SHARK LIVER OIL-MINERAL OIL-PETROLATUM RECTAL OINTMENT 1 APPLICATION(S): at 06:20

## 2022-09-02 NOTE — PROGRESS NOTE ADULT - ASSESSMENT
67 y Male patient S/P exploratory laparotomy for pneumoperitoneum 2/2 duodenal perforation with new found evidence of perihepatic enhancing mass (likely abscess), s/p IR drainage (8/30).    Plan:  - CT A/P today for abscess size monitoring  - Pain control (oxycodone 5/10mg IR when patient tolerating reg diet)  - Flush IR drain w/ 5cc daily  - Protonix BID  - Continue to replete electrolytes  - Continue zosyn, monitor for clinical signs of infection  - DVT ppx  - OOB  - preparations H for hemorrhoid pain     ACS  [id number]

## 2022-09-02 NOTE — PROGRESS NOTE ADULT - SUBJECTIVE AND OBJECTIVE BOX
S/P exploratory laparotomy for pneumoperitoneum 2/2 duodenal perforation with new found evidence of perihepatic enhancing mass (likely abscess)  S/P IR drainage (8/30).      Overnight events:   - No acute events    SUBJECTIVE: Patient seen and examined      OBJECTIVE:  Vital Signs Last 24 Hrs  T(C): 37.2 (02 Sep 2022 10:10), Max: 37.6 (01 Sep 2022 13:06)  T(F): 99 (02 Sep 2022 10:10), Max: 99.7 (01 Sep 2022 13:06)  HR: 77 (02 Sep 2022 10:10) (65 - 78)  BP: 132/70 (02 Sep 2022 10:10) (89/56 - 132/70)  BP(mean): 75 (01 Sep 2022 13:58) (75 - 75)  RR: 18 (02 Sep 2022 10:10) (18 - 18)  SpO2: 98% (02 Sep 2022 10:10) (96% - 98%)    Parameters below as of 02 Sep 2022 10:10  Patient On (Oxygen Delivery Method): room air          09-01-22 @ 07:01  -  09-02-22 @ 07:00  --------------------------------------------------------  IN: 450 mL / OUT: 472 mL / NET: -22 mL        Physical Examination:  Constitutional: A&Ox3, NAD  Respiratory: Unlabored breathing  Abdomen: Soft, nondistended, nontender. DINAH draining purulent brown fluid. IR drain draining purulent brown fluid. Lower incision has slight brown drainage, incision edges are less erythematous   Extremities: WWP, LOREDO spontaneously    LABS:                        11.6   6.79  )-----------( 529      ( 02 Sep 2022 07:41 )             35.3       09-02    129<L>  |  97  |  10  ----------------------------<  109<H>  4.2   |  23  |  0.74    Ca    8.2<L>      02 Sep 2022 07:41  Phos  2.2     09-02  Mg     2.3     09-02

## 2022-09-03 LAB
-  AMPICILLIN: SIGNIFICANT CHANGE UP
-  TETRACYCLINE: SIGNIFICANT CHANGE UP
-  VANCOMYCIN: SIGNIFICANT CHANGE UP
ANION GAP SERPL CALC-SCNC: 12 MMOL/L — SIGNIFICANT CHANGE UP (ref 5–17)
BUN SERPL-MCNC: 12 MG/DL — SIGNIFICANT CHANGE UP (ref 7–23)
CALCIUM SERPL-MCNC: 8.3 MG/DL — LOW (ref 8.4–10.5)
CHLORIDE SERPL-SCNC: 99 MMOL/L — SIGNIFICANT CHANGE UP (ref 96–108)
CO2 SERPL-SCNC: 23 MMOL/L — SIGNIFICANT CHANGE UP (ref 22–31)
CREAT SERPL-MCNC: 0.7 MG/DL — SIGNIFICANT CHANGE UP (ref 0.5–1.3)
CULTURE RESULTS: SIGNIFICANT CHANGE UP
EGFR: 100 ML/MIN/1.73M2 — SIGNIFICANT CHANGE UP
GLUCOSE SERPL-MCNC: 111 MG/DL — HIGH (ref 70–99)
HCT VFR BLD CALC: 34.8 % — LOW (ref 39–50)
HGB BLD-MCNC: 11.4 G/DL — LOW (ref 13–17)
LDH SERPL L TO P-CCNC: 168 U/L — SIGNIFICANT CHANGE UP (ref 50–242)
MAGNESIUM SERPL-MCNC: 2.4 MG/DL — SIGNIFICANT CHANGE UP (ref 1.6–2.6)
MCHC RBC-ENTMCNC: 29.5 PG — SIGNIFICANT CHANGE UP (ref 27–34)
MCHC RBC-ENTMCNC: 32.8 GM/DL — SIGNIFICANT CHANGE UP (ref 32–36)
MCV RBC AUTO: 89.9 FL — SIGNIFICANT CHANGE UP (ref 80–100)
METHOD TYPE: SIGNIFICANT CHANGE UP
NRBC # BLD: 0 /100 WBCS — SIGNIFICANT CHANGE UP (ref 0–0)
ORGANISM # SPEC MICROSCOPIC CNT: SIGNIFICANT CHANGE UP
PHOSPHATE SERPL-MCNC: 2.2 MG/DL — LOW (ref 2.5–4.5)
PLATELET # BLD AUTO: 517 K/UL — HIGH (ref 150–400)
POTASSIUM SERPL-MCNC: 4 MMOL/L — SIGNIFICANT CHANGE UP (ref 3.5–5.3)
POTASSIUM SERPL-SCNC: 4 MMOL/L — SIGNIFICANT CHANGE UP (ref 3.5–5.3)
RBC # BLD: 3.87 M/UL — LOW (ref 4.2–5.8)
RBC # FLD: 12.6 % — SIGNIFICANT CHANGE UP (ref 10.3–14.5)
SODIUM SERPL-SCNC: 134 MMOL/L — LOW (ref 135–145)
SPECIMEN SOURCE: SIGNIFICANT CHANGE UP
WBC # BLD: 6.46 K/UL — SIGNIFICANT CHANGE UP (ref 3.8–10.5)
WBC # FLD AUTO: 6.46 K/UL — SIGNIFICANT CHANGE UP (ref 3.8–10.5)

## 2022-09-03 RX ADMIN — ENOXAPARIN SODIUM 40 MILLIGRAM(S): 100 INJECTION SUBCUTANEOUS at 18:29

## 2022-09-03 RX ADMIN — Medication 85 MILLIMOLE(S): at 10:30

## 2022-09-03 RX ADMIN — PHENYLEPHRINE-SHARK LIVER OIL-MINERAL OIL-PETROLATUM RECTAL OINTMENT 1 APPLICATION(S): at 18:29

## 2022-09-03 RX ADMIN — ERTAPENEM SODIUM 120 MILLIGRAM(S): 1 INJECTION, POWDER, LYOPHILIZED, FOR SOLUTION INTRAMUSCULAR; INTRAVENOUS at 10:31

## 2022-09-03 RX ADMIN — PANTOPRAZOLE SODIUM 40 MILLIGRAM(S): 20 TABLET, DELAYED RELEASE ORAL at 05:31

## 2022-09-03 RX ADMIN — PHENYLEPHRINE-SHARK LIVER OIL-MINERAL OIL-PETROLATUM RECTAL OINTMENT 1 APPLICATION(S): at 05:31

## 2022-09-03 RX ADMIN — PANTOPRAZOLE SODIUM 40 MILLIGRAM(S): 20 TABLET, DELAYED RELEASE ORAL at 18:30

## 2022-09-03 NOTE — PROGRESS NOTE ADULT - SUBJECTIVE AND OBJECTIVE BOX
Surgery Progress Note      SUBJECTIVE: Patient seen and examined at bedside with surgical team. No acute events overnight, afebrile.     OBJECTIVE:    Vital Signs Last 24 Hrs  T(C): 36.9 (03 Sep 2022 01:08), Max: 38.2 (02 Sep 2022 16:34)  T(F): 98.4 (03 Sep 2022 01:08), Max: 100.7 (02 Sep 2022 16:34)  HR: 73 (03 Sep 2022 01:08) (71 - 95)  BP: 112/61 (03 Sep 2022 01:08) (94/67 - 132/70)  BP(mean): --  RR: 18 (03 Sep 2022 01:08) (18 - 18)  SpO2: 93% (03 Sep 2022 01:08) (93% - 98%)    Parameters below as of 03 Sep 2022 01:08  Patient On (Oxygen Delivery Method): room air    I&O's Detail    01 Sep 2022 07:01  -  02 Sep 2022 07:00  --------------------------------------------------------  IN:    Oral Fluid: 450 mL  Total IN: 450 mL    OUT:    Bulb (mL): 0 mL    Drain (mL): 20 mL    Stool (mL): 2 mL    Voided (mL): 450 mL  Total OUT: 472 mL    Total NET: -22 mL      02 Sep 2022 07:01  -  03 Sep 2022 05:07  --------------------------------------------------------  IN:    Oral Fluid: 575 mL  Total IN: 575 mL    OUT:    Bulb (mL): 10 mL    Drain (mL): 10 mL    Voided (mL): 300 mL  Total OUT: 320 mL    Total NET: 255 mL      MEDICATIONS  (STANDING):  enoxaparin Injectable 40 milliGRAM(s) SubCutaneous every 24 hours  ertapenem  IVPB      ertapenem  IVPB 1000 milliGRAM(s) IV Intermittent every 24 hours  hemorrhoidal Ointment 1 Application(s) Rectal two times a day  pantoprazole  Injectable 40 milliGRAM(s) IV Push two times a day    MEDICATIONS  (PRN):  acetaminophen     Tablet .. 650 milliGRAM(s) Oral every 6 hours PRN Temp greater or equal to 38C (100.4F), Mild Pain (1 - 3)      PHYSICAL EXAM:  Constitutional: A&Ox3, NAD  Respiratory: Unlabored breathing  Abdomen: Soft, nondistended, nontender. DINAH draining purulent brown fluid. IR drain draining purulent brown fluid. Lower incision has slight brown drainage, incision edges are less erythematous   Extremities: WWP, LOREDO spontaneously      LABS:                        11.6   6.79  )-----------( 529      ( 02 Sep 2022 07:41 )             35.3     09-02    129<L>  |  97  |  10  ----------------------------<  109<H>  4.2   |  23  |  0.74    Ca    8.2<L>      02 Sep 2022 07:41  Phos  2.2     09-02  Mg     2.3     09-02

## 2022-09-04 LAB
ANION GAP SERPL CALC-SCNC: 8 MMOL/L — SIGNIFICANT CHANGE UP (ref 5–17)
BUN SERPL-MCNC: 13 MG/DL — SIGNIFICANT CHANGE UP (ref 7–23)
CALCIUM SERPL-MCNC: 8.5 MG/DL — SIGNIFICANT CHANGE UP (ref 8.4–10.5)
CHLORIDE SERPL-SCNC: 99 MMOL/L — SIGNIFICANT CHANGE UP (ref 96–108)
CO2 SERPL-SCNC: 24 MMOL/L — SIGNIFICANT CHANGE UP (ref 22–31)
CREAT SERPL-MCNC: 0.73 MG/DL — SIGNIFICANT CHANGE UP (ref 0.5–1.3)
EGFR: 98 ML/MIN/1.73M2 — SIGNIFICANT CHANGE UP
GLUCOSE SERPL-MCNC: 112 MG/DL — HIGH (ref 70–99)
HCT VFR BLD CALC: 34.5 % — LOW (ref 39–50)
HGB BLD-MCNC: 11.3 G/DL — LOW (ref 13–17)
MAGNESIUM SERPL-MCNC: 2.6 MG/DL — SIGNIFICANT CHANGE UP (ref 1.6–2.6)
MCHC RBC-ENTMCNC: 29.4 PG — SIGNIFICANT CHANGE UP (ref 27–34)
MCHC RBC-ENTMCNC: 32.8 GM/DL — SIGNIFICANT CHANGE UP (ref 32–36)
MCV RBC AUTO: 89.8 FL — SIGNIFICANT CHANGE UP (ref 80–100)
NRBC # BLD: 0 /100 WBCS — SIGNIFICANT CHANGE UP (ref 0–0)
PHOSPHATE SERPL-MCNC: 2.2 MG/DL — LOW (ref 2.5–4.5)
PLATELET # BLD AUTO: 509 K/UL — HIGH (ref 150–400)
POTASSIUM SERPL-MCNC: 4.1 MMOL/L — SIGNIFICANT CHANGE UP (ref 3.5–5.3)
POTASSIUM SERPL-SCNC: 4.1 MMOL/L — SIGNIFICANT CHANGE UP (ref 3.5–5.3)
RBC # BLD: 3.84 M/UL — LOW (ref 4.2–5.8)
RBC # FLD: 12.5 % — SIGNIFICANT CHANGE UP (ref 10.3–14.5)
SODIUM SERPL-SCNC: 131 MMOL/L — LOW (ref 135–145)
WBC # BLD: 6.54 K/UL — SIGNIFICANT CHANGE UP (ref 3.8–10.5)
WBC # FLD AUTO: 6.54 K/UL — SIGNIFICANT CHANGE UP (ref 3.8–10.5)

## 2022-09-04 RX ORDER — SODIUM,POTASSIUM PHOSPHATES 278-250MG
2 POWDER IN PACKET (EA) ORAL ONCE
Refills: 0 | Status: COMPLETED | OUTPATIENT
Start: 2022-09-04 | End: 2022-09-04

## 2022-09-04 RX ADMIN — PHENYLEPHRINE-SHARK LIVER OIL-MINERAL OIL-PETROLATUM RECTAL OINTMENT 1 APPLICATION(S): at 17:36

## 2022-09-04 RX ADMIN — ENOXAPARIN SODIUM 40 MILLIGRAM(S): 100 INJECTION SUBCUTANEOUS at 17:35

## 2022-09-04 RX ADMIN — PANTOPRAZOLE SODIUM 40 MILLIGRAM(S): 20 TABLET, DELAYED RELEASE ORAL at 17:36

## 2022-09-04 RX ADMIN — PANTOPRAZOLE SODIUM 40 MILLIGRAM(S): 20 TABLET, DELAYED RELEASE ORAL at 05:18

## 2022-09-04 RX ADMIN — ERTAPENEM SODIUM 120 MILLIGRAM(S): 1 INJECTION, POWDER, LYOPHILIZED, FOR SOLUTION INTRAMUSCULAR; INTRAVENOUS at 12:16

## 2022-09-04 RX ADMIN — PHENYLEPHRINE-SHARK LIVER OIL-MINERAL OIL-PETROLATUM RECTAL OINTMENT 1 APPLICATION(S): at 05:18

## 2022-09-04 RX ADMIN — Medication 2 PACKET(S): at 12:14

## 2022-09-04 NOTE — PROGRESS NOTE ADULT - SUBJECTIVE AND OBJECTIVE BOX
SUBJECTIVE: Patient seen and examined at bedside on AM rounds. Patient reports that they're feeling well. NPO/Tolerating diet, denies nausea, vomiting.    Flatus/    BM. OOB/Amublating as tolerated. Denies fever, chills.    PMH:   ***    ALLERGIES:  No Known Allergies      --------------------------------------------------------------------------------------    MEDICATIONS:    Neurologic Medications  acetaminophen     Tablet .. 650 milliGRAM(s) Oral every 6 hours PRN Temp greater or equal to 38C (100.4F), Mild Pain (1 - 3)    Respiratory Medications    Cardiovascular Medications    Gastrointestinal Medications  pantoprazole  Injectable 40 milliGRAM(s) IV Push two times a day    Genitourinary Medications    Hematologic/Oncologic Medications  enoxaparin Injectable 40 milliGRAM(s) SubCutaneous every 24 hours    Antimicrobial/Immunologic Medications  ertapenem  IVPB      ertapenem  IVPB 1000 milliGRAM(s) IV Intermittent every 24 hours    Endocrine/Metabolic Medications    Topical/Other Medications  hemorrhoidal Ointment 1 Application(s) Rectal two times a day    --------------------------------------------------------------------------------------    VITAL SIGNS:  ICU Vital Signs Last 24 Hrs  T(C): 36.7 (04 Sep 2022 05:33), Max: 37.4 (03 Sep 2022 21:36)  T(F): 98.1 (04 Sep 2022 05:33), Max: 99.3 (03 Sep 2022 21:36)  HR: 74 (04 Sep 2022 05:33) (74 - 96)  BP: 102/65 (04 Sep 2022 05:33) (97/66 - 124/70)  BP(mean): --  ABP: --  ABP(mean): --  RR: 18 (04 Sep 2022 05:33) (18 - 18)  SpO2: 97% (04 Sep 2022 05:33) (96% - 98%)    O2 Parameters below as of 04 Sep 2022 05:33  Patient On (Oxygen Delivery Method): room air  --------------------------------------------------------------------------------------    EXAM    General: NAD, resting in bed comfortably.  Cardiac: regular rate, warm and well perfused  Respiratory: Nonlabored respirations, normal cw expansion.  Abdomen: soft, nontender, nondistended. ___ incision is c/d/i, ostomy, NGT, tsang.   Extremities: normal strength, FROM, no deformities    --------------------------------------------------------------------------------------    LABS                        11.4   6.46  )-----------( 517      ( 03 Sep 2022 07:21 )             34.8   09-03    134<L>  |  99  |  12  ----------------------------<  111<H>  4.0   |  23  |  0.70    Ca    8.3<L>      03 Sep 2022 07:27  Phos  2.2     09-03  Mg     2.4     09-03    --------------------------------------------------------------------------------------    INS AND OUTS:  I&O's Detail    02 Sep 2022 07:01  -  03 Sep 2022 07:00  --------------------------------------------------------  IN:    Oral Fluid: 575 mL  Total IN: 575 mL    OUT:    Bulb (mL): 15 mL    Drain (mL): 30 mL    Voided (mL): 300 mL  Total OUT: 345 mL    Total NET: 230 mL      03 Sep 2022 07:01  -  04 Sep 2022 05:38  --------------------------------------------------------  IN:    Oral Fluid: 980 mL  Total IN: 980 mL    OUT:    Bulb (mL): 7 mL    Drain (mL): 10 mL    Emesis (mL): 0 mL    Stool (mL): 1 mL    Voided (mL): 1150 mL  Total OUT: 1168 mL    Total NET: -188 mL  -------------------------------------------------------------------------------------- SUBJECTIVE: Patient seen and examined at bedside on AM rounds. Patient reports that they're feeling well. NPO/Tolerating diet, denies nausea, vomiting.    Flatus/ BM. OOB/Amublating as tolerated. Denies fever, chills.    PMH:      ALLERGIES:  No Known Allergies      --------------------------------------------------------------------------------------    MEDICATIONS:    Neurologic Medications  acetaminophen     Tablet .. 650 milliGRAM(s) Oral every 6 hours PRN Temp greater or equal to 38C (100.4F), Mild Pain (1 - 3)    Respiratory Medications    Cardiovascular Medications    Gastrointestinal Medications  pantoprazole  Injectable 40 milliGRAM(s) IV Push two times a day    Genitourinary Medications    Hematologic/Oncologic Medications  enoxaparin Injectable 40 milliGRAM(s) SubCutaneous every 24 hours    Antimicrobial/Immunologic Medications  ertapenem  IVPB      ertapenem  IVPB 1000 milliGRAM(s) IV Intermittent every 24 hours    Endocrine/Metabolic Medications    Topical/Other Medications  hemorrhoidal Ointment 1 Application(s) Rectal two times a day    --------------------------------------------------------------------------------------    VITAL SIGNS:  ICU Vital Signs Last 24 Hrs  T(C): 36.7 (04 Sep 2022 05:33), Max: 37.4 (03 Sep 2022 21:36)  T(F): 98.1 (04 Sep 2022 05:33), Max: 99.3 (03 Sep 2022 21:36)  HR: 74 (04 Sep 2022 05:33) (74 - 96)  BP: 102/65 (04 Sep 2022 05:33) (97/66 - 124/70)  BP(mean): --  ABP: --  ABP(mean): --  RR: 18 (04 Sep 2022 05:33) (18 - 18)  SpO2: 97% (04 Sep 2022 05:33) (96% - 98%)    O2 Parameters below as of 04 Sep 2022 05:33  Patient On (Oxygen Delivery Method): room air  --------------------------------------------------------------------------------------    EXAM    General: NAD, resting in bed comfortably.  Cardiac: regular rate, warm and well perfused  Respiratory: Nonlabored respirations, normal cw expansion.  Abdomen: soft, nontender, nondistended. Midline incision dressing strike through with yellow fluid, IR drain in place drain purulent fluid  Extremities: normal strength, FROM, no deformities    --------------------------------------------------------------------------------------    LABS                        11.4   6.46  )-----------( 517      ( 03 Sep 2022 07:21 )             34.8   09-03    134<L>  |  99  |  12  ----------------------------<  111<H>  4.0   |  23  |  0.70    Ca    8.3<L>      03 Sep 2022 07:27  Phos  2.2     09-03  Mg     2.4     09-03    --------------------------------------------------------------------------------------    INS AND OUTS:  I&O's Detail    02 Sep 2022 07:01  -  03 Sep 2022 07:00  --------------------------------------------------------  IN:    Oral Fluid: 575 mL  Total IN: 575 mL    OUT:    Bulb (mL): 15 mL    Drain (mL): 30 mL    Voided (mL): 300 mL  Total OUT: 345 mL    Total NET: 230 mL      03 Sep 2022 07:01  -  04 Sep 2022 05:38  --------------------------------------------------------  IN:    Oral Fluid: 980 mL  Total IN: 980 mL    OUT:    Bulb (mL): 7 mL    Drain (mL): 10 mL    Emesis (mL): 0 mL    Stool (mL): 1 mL    Voided (mL): 1150 mL  Total OUT: 1168 mL    Total NET: -188 mL  --------------------------------------------------------------------------------------

## 2022-09-04 NOTE — PROGRESS NOTE ADULT - ASSESSMENT
67 y Male patient S/P exploratory laparotomy for pneumoperitoneum 2/2 duodenal perforation with new found evidence of perihepatic enhancing mass (likely abscess), s/p IR drainage (8/30).    Plan:  - CT A/P 9/3 redemonstrated 3 intra-abdominal collections  - Pain control (oxycodone 5/10mg IR when patient tolerating reg diet)  - Flush IR drain w/ 5cc daily  - Protonix BID  - Continue to replete electrolytes  - Continue ertapenem, monitor for clinical signs of infection  - DVT ppx  - OOB  - preparations H for hemorrhoid pain     ACS  [id number] 67 y Male patient S/P exploratory laparotomy for pneumoperitoneum 2/2 duodenal perforation with new found evidence of perihepatic enhancing mass (likely abscess), s/p IR drainage (8/30).    Plan:  - CT A/P 9/3 redemonstrated 3 intra-abdominal collections  - Pain control (oxycodone 5/10mg IR when patient tolerating reg diet)  - Flush IR drain w/ 5cc daily  - Protonix BID  - Continue to replete electrolytes  - Continue ertapenem, monitor for clinical signs of infection  - DVT ppx  - OOB  - preparations H for hemorrhoid pain   - Tube check with IR tuesday    ACS  [id number]

## 2022-09-04 NOTE — PROGRESS NOTE ADULT - NUTRITIONAL ASSESSMENT
This patient has been assessed with a concern for Malnutrition and has been determined to have a diagnosis/diagnoses of Severe protein-calorie malnutrition.    This patient is being managed with:   Diet NPO after Midnight-     NPO Start Date: 29-Aug-2022   NPO Start Time: 23:59  Entered: Aug 29 2022  4:57PM    Diet Regular-  Entered: Aug 28 2022 12:54PM    
This patient has been assessed with a concern for Malnutrition and has been determined to have a diagnosis/diagnoses of Severe protein-calorie malnutrition.    This patient is being managed with:   Diet NPO-  Entered: Aug 21 2022 10:02AM    
This patient has been assessed with a concern for Malnutrition and has been determined to have a diagnosis/diagnoses of Severe protein-calorie malnutrition.    This patient is being managed with:   Diet Regular-  Entered: Aug 28 2022 12:54PM    
This patient has been assessed with a concern for Malnutrition and has been determined to have a diagnosis/diagnoses of Severe protein-calorie malnutrition.    This patient is being managed with:   Diet Regular-  Supplement Feeding Modality:  Oral  Ensure Enlive Cans or Servings Per Day:  1       Frequency:  Daily  Entered: Sep  1 2022  4:26PM    
This patient has been assessed with a concern for Malnutrition and has been determined to have a diagnosis/diagnoses of Severe protein-calorie malnutrition.    This patient is being managed with:   Diet Regular-  Supplement Feeding Modality:  Oral  Ensure Enlive Cans or Servings Per Day:  1       Frequency:  Daily  Entered: Sep  1 2022  4:26PM

## 2022-09-05 LAB
ANION GAP SERPL CALC-SCNC: 9 MMOL/L — SIGNIFICANT CHANGE UP (ref 5–17)
BUN SERPL-MCNC: 17 MG/DL — SIGNIFICANT CHANGE UP (ref 7–23)
CALCIUM SERPL-MCNC: 8.4 MG/DL — SIGNIFICANT CHANGE UP (ref 8.4–10.5)
CHLORIDE SERPL-SCNC: 98 MMOL/L — SIGNIFICANT CHANGE UP (ref 96–108)
CO2 SERPL-SCNC: 25 MMOL/L — SIGNIFICANT CHANGE UP (ref 22–31)
CREAT SERPL-MCNC: 0.87 MG/DL — SIGNIFICANT CHANGE UP (ref 0.5–1.3)
EGFR: 93 ML/MIN/1.73M2 — SIGNIFICANT CHANGE UP
GLUCOSE SERPL-MCNC: 115 MG/DL — HIGH (ref 70–99)
HCT VFR BLD CALC: 35.4 % — LOW (ref 39–50)
HGB BLD-MCNC: 11.7 G/DL — LOW (ref 13–17)
MAGNESIUM SERPL-MCNC: 2.3 MG/DL — SIGNIFICANT CHANGE UP (ref 1.6–2.6)
MCHC RBC-ENTMCNC: 29.5 PG — SIGNIFICANT CHANGE UP (ref 27–34)
MCHC RBC-ENTMCNC: 33.1 GM/DL — SIGNIFICANT CHANGE UP (ref 32–36)
MCV RBC AUTO: 89.4 FL — SIGNIFICANT CHANGE UP (ref 80–100)
NRBC # BLD: 0 /100 WBCS — SIGNIFICANT CHANGE UP (ref 0–0)
PHOSPHATE SERPL-MCNC: 2.6 MG/DL — SIGNIFICANT CHANGE UP (ref 2.5–4.5)
PLATELET # BLD AUTO: 496 K/UL — HIGH (ref 150–400)
POTASSIUM SERPL-MCNC: 4.2 MMOL/L — SIGNIFICANT CHANGE UP (ref 3.5–5.3)
POTASSIUM SERPL-SCNC: 4.2 MMOL/L — SIGNIFICANT CHANGE UP (ref 3.5–5.3)
RBC # BLD: 3.96 M/UL — LOW (ref 4.2–5.8)
RBC # FLD: 12.6 % — SIGNIFICANT CHANGE UP (ref 10.3–14.5)
SODIUM SERPL-SCNC: 132 MMOL/L — LOW (ref 135–145)
WBC # BLD: 7.98 K/UL — SIGNIFICANT CHANGE UP (ref 3.8–10.5)
WBC # FLD AUTO: 7.98 K/UL — SIGNIFICANT CHANGE UP (ref 3.8–10.5)

## 2022-09-05 RX ORDER — SODIUM,POTASSIUM PHOSPHATES 278-250MG
1 POWDER IN PACKET (EA) ORAL ONCE
Refills: 0 | Status: COMPLETED | OUTPATIENT
Start: 2022-09-05 | End: 2022-09-05

## 2022-09-05 RX ADMIN — PHENYLEPHRINE-SHARK LIVER OIL-MINERAL OIL-PETROLATUM RECTAL OINTMENT 1 APPLICATION(S): at 05:12

## 2022-09-05 RX ADMIN — PANTOPRAZOLE SODIUM 40 MILLIGRAM(S): 20 TABLET, DELAYED RELEASE ORAL at 17:33

## 2022-09-05 RX ADMIN — ENOXAPARIN SODIUM 40 MILLIGRAM(S): 100 INJECTION SUBCUTANEOUS at 17:33

## 2022-09-05 RX ADMIN — Medication 1 PACKET(S): at 09:12

## 2022-09-05 RX ADMIN — PHENYLEPHRINE-SHARK LIVER OIL-MINERAL OIL-PETROLATUM RECTAL OINTMENT 1 APPLICATION(S): at 17:34

## 2022-09-05 RX ADMIN — ERTAPENEM SODIUM 120 MILLIGRAM(S): 1 INJECTION, POWDER, LYOPHILIZED, FOR SOLUTION INTRAMUSCULAR; INTRAVENOUS at 09:12

## 2022-09-05 RX ADMIN — PANTOPRAZOLE SODIUM 40 MILLIGRAM(S): 20 TABLET, DELAYED RELEASE ORAL at 05:12

## 2022-09-05 NOTE — PROGRESS NOTE ADULT - SUBJECTIVE AND OBJECTIVE BOX
Surgery Progress Note      SUBJECTIVE: Patient seen and examined at bedside with surgical team. No acute events overnight.     OBJECTIVE:    Vital Signs Last 24 Hrs  T(C): 37.8 (04 Sep 2022 22:03), Max: 37.8 (04 Sep 2022 22:03)  T(F): 100.1 (04 Sep 2022 22:03), Max: 100.1 (04 Sep 2022 22:03)  HR: 85 (04 Sep 2022 22:03) (74 - 89)  BP: 97/62 (04 Sep 2022 22:03) (97/62 - 116/61)  BP(mean): --  RR: 18 (04 Sep 2022 22:03) (18 - 18)  SpO2: 97% (04 Sep 2022 22:03) (97% - 98%)    Parameters below as of 04 Sep 2022 22:03  Patient On (Oxygen Delivery Method): room air    I&O's Detail    03 Sep 2022 07:01  -  04 Sep 2022 07:00  --------------------------------------------------------  IN:    Oral Fluid: 980 mL  Total IN: 980 mL    OUT:    Bulb (mL): 7 mL    Drain (mL): 10 mL    Emesis (mL): 0 mL    Stool (mL): 1 mL    Voided (mL): 1150 mL  Total OUT: 1168 mL    Total NET: -188 mL      04 Sep 2022 07:01  -  05 Sep 2022 00:19  --------------------------------------------------------  IN:    Oral Fluid: 380 mL  Total IN: 380 mL    OUT:    Bulb (mL): 0 mL    Drain (mL): 0 mL    Emesis (mL): 0 mL  Total OUT: 0 mL    Total NET: 380 mL      MEDICATIONS  (STANDING):  enoxaparin Injectable 40 milliGRAM(s) SubCutaneous every 24 hours  ertapenem  IVPB      ertapenem  IVPB 1000 milliGRAM(s) IV Intermittent every 24 hours  hemorrhoidal Ointment 1 Application(s) Rectal two times a day  pantoprazole  Injectable 40 milliGRAM(s) IV Push two times a day    MEDICATIONS  (PRN):  acetaminophen     Tablet .. 650 milliGRAM(s) Oral every 6 hours PRN Temp greater or equal to 38C (100.4F), Mild Pain (1 - 3)      PHYSICAL EXAM:  Constitutional: A&Ox3, NAD  Respiratory: Unlabored breathing  Abdomen: Soft, nondistended, nontender. No rebound or guarding. Midline incision dressing strike through with yellow fluid, IR drain in place drain purulent fluid  Extremities: WWP, LOREDO spontaneously    LABS:                        11.3   6.54  )-----------( 509      ( 04 Sep 2022 07:11 )             34.5     09-04    131<L>  |  99  |  13  ----------------------------<  112<H>  4.1   |  24  |  0.73    Ca    8.5      04 Sep 2022 07:11  Phos  2.2     09-04  Mg     2.6     09-04                 Surgery Progress Note      SUBJECTIVE: Patient seen and examined at bedside with surgical team. No acute events overnight. Feeling well, no new concerns this AM.     OBJECTIVE:    Vital Signs Last 24 Hrs  T(C): 37.8 (04 Sep 2022 22:03), Max: 37.8 (04 Sep 2022 22:03)  T(F): 100.1 (04 Sep 2022 22:03), Max: 100.1 (04 Sep 2022 22:03)  HR: 85 (04 Sep 2022 22:03) (74 - 89)  BP: 97/62 (04 Sep 2022 22:03) (97/62 - 116/61)  BP(mean): --  RR: 18 (04 Sep 2022 22:03) (18 - 18)  SpO2: 97% (04 Sep 2022 22:03) (97% - 98%)    Parameters below as of 04 Sep 2022 22:03  Patient On (Oxygen Delivery Method): room air    I&O's Detail    03 Sep 2022 07:01  -  04 Sep 2022 07:00  --------------------------------------------------------  IN:    Oral Fluid: 980 mL  Total IN: 980 mL    OUT:    Bulb (mL): 7 mL    Drain (mL): 10 mL    Emesis (mL): 0 mL    Stool (mL): 1 mL    Voided (mL): 1150 mL  Total OUT: 1168 mL    Total NET: -188 mL      04 Sep 2022 07:01  -  05 Sep 2022 00:19  --------------------------------------------------------  IN:    Oral Fluid: 380 mL  Total IN: 380 mL    OUT:    Bulb (mL): 0 mL    Drain (mL): 0 mL    Emesis (mL): 0 mL  Total OUT: 0 mL    Total NET: 380 mL      MEDICATIONS  (STANDING):  enoxaparin Injectable 40 milliGRAM(s) SubCutaneous every 24 hours  ertapenem  IVPB      ertapenem  IVPB 1000 milliGRAM(s) IV Intermittent every 24 hours  hemorrhoidal Ointment 1 Application(s) Rectal two times a day  pantoprazole  Injectable 40 milliGRAM(s) IV Push two times a day    MEDICATIONS  (PRN):  acetaminophen     Tablet .. 650 milliGRAM(s) Oral every 6 hours PRN Temp greater or equal to 38C (100.4F), Mild Pain (1 - 3)      PHYSICAL EXAM:  Constitutional: A&Ox3, NAD  Respiratory: Unlabored breathing  Abdomen: Soft, nondistended, nontender. No rebound or guarding. Midline incision dressing strike through with yellow fluid, IR drain in place drain purulent fluid  Extremities: WWP, LOREDO spontaneously    LABS:                        11.3   6.54  )-----------( 509      ( 04 Sep 2022 07:11 )             34.5     09-04    131<L>  |  99  |  13  ----------------------------<  112<H>  4.1   |  24  |  0.73    Ca    8.5      04 Sep 2022 07:11  Phos  2.2     09-04  Mg     2.6     09-04

## 2022-09-05 NOTE — PROGRESS NOTE ADULT - ASSESSMENT
67 y Male patient S/P exploratory laparotomy for pneumoperitoneum 2/2 duodenal perforation with new found evidence of perihepatic enhancing mass (likely abscess), s/p IR drainage (8/30).    Plan:  - CT A/P 9/3 redemonstrated 3 intra-abdominal collections  - Pain control  - Regular diet  - Flush IR drain w/ 5cc daily  - Protonix BID  - Continue to replete electrolytes  - Continue ertapenem, monitor for clinical signs of infection  - DVT ppx  - OOB  - preparations H for hemorrhoid pain   - Tube check with IR tuesday    ACS  [id number]

## 2022-09-06 LAB
ANION GAP SERPL CALC-SCNC: 9 MMOL/L — SIGNIFICANT CHANGE UP (ref 5–17)
BUN SERPL-MCNC: 15 MG/DL — SIGNIFICANT CHANGE UP (ref 7–23)
CALCIUM SERPL-MCNC: 8.5 MG/DL — SIGNIFICANT CHANGE UP (ref 8.4–10.5)
CHLORIDE SERPL-SCNC: 98 MMOL/L — SIGNIFICANT CHANGE UP (ref 96–108)
CO2 SERPL-SCNC: 25 MMOL/L — SIGNIFICANT CHANGE UP (ref 22–31)
CREAT SERPL-MCNC: 0.76 MG/DL — SIGNIFICANT CHANGE UP (ref 0.5–1.3)
EGFR: 97 ML/MIN/1.73M2 — SIGNIFICANT CHANGE UP
GLUCOSE SERPL-MCNC: 124 MG/DL — HIGH (ref 70–99)
HCT VFR BLD CALC: 35.3 % — LOW (ref 39–50)
HGB BLD-MCNC: 11.5 G/DL — LOW (ref 13–17)
MAGNESIUM SERPL-MCNC: 2.3 MG/DL — SIGNIFICANT CHANGE UP (ref 1.6–2.6)
MCHC RBC-ENTMCNC: 29.5 PG — SIGNIFICANT CHANGE UP (ref 27–34)
MCHC RBC-ENTMCNC: 32.6 GM/DL — SIGNIFICANT CHANGE UP (ref 32–36)
MCV RBC AUTO: 90.5 FL — SIGNIFICANT CHANGE UP (ref 80–100)
NRBC # BLD: 0 /100 WBCS — SIGNIFICANT CHANGE UP (ref 0–0)
PHOSPHATE SERPL-MCNC: 2.8 MG/DL — SIGNIFICANT CHANGE UP (ref 2.5–4.5)
PLATELET # BLD AUTO: 503 K/UL — HIGH (ref 150–400)
POTASSIUM SERPL-MCNC: 4.2 MMOL/L — SIGNIFICANT CHANGE UP (ref 3.5–5.3)
POTASSIUM SERPL-SCNC: 4.2 MMOL/L — SIGNIFICANT CHANGE UP (ref 3.5–5.3)
RBC # BLD: 3.9 M/UL — LOW (ref 4.2–5.8)
RBC # FLD: 12.6 % — SIGNIFICANT CHANGE UP (ref 10.3–14.5)
SODIUM SERPL-SCNC: 132 MMOL/L — LOW (ref 135–145)
WBC # BLD: 7.57 K/UL — SIGNIFICANT CHANGE UP (ref 3.8–10.5)
WBC # FLD AUTO: 7.57 K/UL — SIGNIFICANT CHANGE UP (ref 3.8–10.5)

## 2022-09-06 PROCEDURE — 76080 X-RAY EXAM OF FISTULA: CPT | Mod: 26,59

## 2022-09-06 PROCEDURE — 49424 ASSESS CYST CONTRAST INJECT: CPT | Mod: 59

## 2022-09-06 RX ADMIN — PANTOPRAZOLE SODIUM 40 MILLIGRAM(S): 20 TABLET, DELAYED RELEASE ORAL at 05:26

## 2022-09-06 RX ADMIN — PHENYLEPHRINE-SHARK LIVER OIL-MINERAL OIL-PETROLATUM RECTAL OINTMENT 1 APPLICATION(S): at 16:58

## 2022-09-06 RX ADMIN — ERTAPENEM SODIUM 120 MILLIGRAM(S): 1 INJECTION, POWDER, LYOPHILIZED, FOR SOLUTION INTRAMUSCULAR; INTRAVENOUS at 11:54

## 2022-09-06 RX ADMIN — ENOXAPARIN SODIUM 40 MILLIGRAM(S): 100 INJECTION SUBCUTANEOUS at 16:59

## 2022-09-06 RX ADMIN — PHENYLEPHRINE-SHARK LIVER OIL-MINERAL OIL-PETROLATUM RECTAL OINTMENT 1 APPLICATION(S): at 05:26

## 2022-09-06 RX ADMIN — PANTOPRAZOLE SODIUM 40 MILLIGRAM(S): 20 TABLET, DELAYED RELEASE ORAL at 16:58

## 2022-09-06 RX ADMIN — Medication 63.75 MILLIMOLE(S): at 11:54

## 2022-09-06 NOTE — CHART NOTE - NSCHARTNOTEFT_GEN_A_CORE
CT reviewed, decrease in collection size with no drain output. Will plan for drain study today.     please place IR procedure order under KENDRA Lomeli  above d/w primary team

## 2022-09-06 NOTE — PROGRESS NOTE ADULT - ASSESSMENT
67 y Male patient S/P exploratory laparotomy for pneumoperitoneum 2/2 duodenal perforation with new found evidence of perihepatic enhancing mass (likely abscess), s/p IR drainage (8/30).    Plan:  - CT A/P 9/3 redemonstrated 3 intra-abdominal collections  - Pain control  - Regular diet  - Flush IR drain w/ 5cc daily  - Protonix BID  - Continue to replete electrolytes  - Continue ertapenem, monitor for clinical signs of infection  - DVT ppx  - OOB  - preparations H for hemorrhoid pain   - Tube check with IR today    ACS  [id number]

## 2022-09-06 NOTE — PROGRESS NOTE ADULT - SUBJECTIVE AND OBJECTIVE BOX
Surgery Progress Note      SUBJECTIVE: Patient seen and examined at bedside with surgical team. No acute events overnight.     OBJECTIVE:    Vital Signs Last 24 Hrs  T(C): 37.4 (06 Sep 2022 00:11), Max: 37.6 (05 Sep 2022 16:59)  T(F): 99.4 (06 Sep 2022 00:11), Max: 99.6 (05 Sep 2022 16:59)  HR: 78 (06 Sep 2022 00:11) (74 - 98)  BP: 113/63 (06 Sep 2022 00:11) (93/61 - 115/65)  BP(mean): --  RR: 18 (06 Sep 2022 00:11) (18 - 18)  SpO2: 97% (06 Sep 2022 00:11) (96% - 100%)    Parameters below as of 06 Sep 2022 00:11  Patient On (Oxygen Delivery Method): room air    I&O's Detail    04 Sep 2022 07:01  -  05 Sep 2022 07:00  --------------------------------------------------------  IN:    Oral Fluid: 500 mL  Total IN: 500 mL    OUT:    Bulb (mL): 5 mL    Drain (mL): 5 mL    Emesis (mL): 0 mL  Total OUT: 10 mL    Total NET: 490 mL      05 Sep 2022 07:01  -  06 Sep 2022 00:42  --------------------------------------------------------  IN:    Oral Fluid: 940 mL  Total IN: 940 mL    OUT:    Bulb (mL): 0 mL    Drain (mL): 0 mL    Voided (mL): 0 mL  Total OUT: 0 mL    Total NET: 940 mL      MEDICATIONS  (STANDING):  enoxaparin Injectable 40 milliGRAM(s) SubCutaneous every 24 hours  ertapenem  IVPB      ertapenem  IVPB 1000 milliGRAM(s) IV Intermittent every 24 hours  hemorrhoidal Ointment 1 Application(s) Rectal two times a day  pantoprazole  Injectable 40 milliGRAM(s) IV Push two times a day    MEDICATIONS  (PRN):  acetaminophen     Tablet .. 650 milliGRAM(s) Oral every 6 hours PRN Temp greater or equal to 38C (100.4F), Mild Pain (1 - 3)      PHYSICAL EXAM:  Constitutional: A&Ox3, NAD  Respiratory: Unlabored breathing  Abdomen: Soft, nondistended, nontender. No rebound or guarding.  Midline incision dressing with yellow fluid, IR drain in place drain purulent fluid  Extremities: WWP, LOREDO spontaneously    LABS:                        11.7   7.98  )-----------( 496      ( 05 Sep 2022 06:50 )             35.4     09-05    132<L>  |  98  |  17  ----------------------------<  115<H>  4.2   |  25  |  0.87    Ca    8.4      05 Sep 2022 06:59  Phos  2.6     09-05  Mg     2.3     09-05

## 2022-09-06 NOTE — CHART NOTE - NSCHARTNOTEFT_GEN_A_CORE
SUBJECTIVE: Patient seen and examined at bedside during post-procedure check. Patient reports that they're feeling well. Tolerating diet, denies nausea, vomiting. +/+. Amublating as tolerated. Denies fever, chills.    PMH:   ***    ALLERGIES:  No Known Allergies  --------------------------------------------------------------------------------------  MEDICATIONS:    Neurologic Medications  acetaminophen     Tablet .. 650 milliGRAM(s) Oral every 6 hours PRN Temp greater or equal to 38C (100.4F), Mild Pain (1 - 3)    Respiratory Medications    Cardiovascular Medications    Gastrointestinal Medications  pantoprazole  Injectable 40 milliGRAM(s) IV Push two times a day    Genitourinary Medications    Hematologic/Oncologic Medications  enoxaparin Injectable 40 milliGRAM(s) SubCutaneous every 24 hours    Antimicrobial/Immunologic Medications  ertapenem  IVPB      ertapenem  IVPB 1000 milliGRAM(s) IV Intermittent every 24 hours    Endocrine/Metabolic Medications    Topical/Other Medications  hemorrhoidal Ointment 1 Application(s) Rectal two times a day    --------------------------------------------------------------------------------------    VITAL SIGNS:  ICU Vital Signs Last 24 Hrs  T(C): 37.4 (06 Sep 2022 16:23), Max: 37.4 (06 Sep 2022 00:11)  T(F): 99.4 (06 Sep 2022 16:23), Max: 99.4 (06 Sep 2022 00:11)  HR: 75 (06 Sep 2022 16:23) (73 - 85)  BP: 115/66 (06 Sep 2022 16:23) (105/63 - 121/66)  BP(mean): --  ABP: --  ABP(mean): --  RR: 18 (06 Sep 2022 16:23) (16 - 18)  SpO2: 97% (06 Sep 2022 16:23) (95% - 99%)    O2 Parameters below as of 06 Sep 2022 16:23  Patient On (Oxygen Delivery Method): room air  --------------------------------------------------------------------------------------  EXAM    General: NAD, resting in bed comfortably.  Cardiac: regular rate, warm and well perfused  Respiratory: Nonlabored respirations, normal cw expansion.  Abdomen: soft, nontender, nondistended. Midline incision is saturated with purulent fluid. DINAH drain in place not draining. IR drain removed  Extremities: normal strength, FROM, no deformities    --------------------------------------------------------------------------------------    LABS                        11.5   7.57  )-----------( 503      ( 06 Sep 2022 06:04 )             35.3   09-06    132<L>  |  98  |  15  ----------------------------<  124<H>  4.2   |  25  |  0.76    Ca    8.5      06 Sep 2022 06:04  Phos  2.8     09-06  Mg     2.3     09-06    --------------------------------------------------------------------------------------    INS AND OUTS:  I&O's Detail    05 Sep 2022 07:01  -  06 Sep 2022 07:00  --------------------------------------------------------  IN:    Oral Fluid: 940 mL  Total IN: 940 mL    OUT:    Bulb (mL): 0 mL    Drain (mL): 0 mL    Voided (mL): 200 mL  Total OUT: 200 mL    Total NET: 740 mL      06 Sep 2022 07:01  -  06 Sep 2022 18:17  --------------------------------------------------------  IN:    Oral Fluid: 225 mL  Total IN: 225 mL    OUT:    Voided (mL): 400 mL  Total OUT: 400 mL    Total NET: -175 mL  --------------------------------------------------------------------------------------    A/P  67 y Male patient S/P exploratory laparotomy for pneumoperitoneum 2/2 duodenal perforation with new found evidence of perihepatic enhancing mass (likely abscess), s/p IR drainage (8/30).  - Continue to monitor drainage from wound, if continues to be high must have high suspicion for fistula  - Continue abx  - Trend vitals, will follow-up need for further IR intervention

## 2022-09-06 NOTE — PROGRESS NOTE ADULT - ATTENDING COMMENTS
ATTENDING ATTESTATION  I have seen and examined this patient on rounds thismorning with the surgery team. I have reviewed all new labs, imaging and reports. I have participated in formulating the plan for the day, and have read and agree with the history, ROS, exam, assessment and plan as stated above.     NGT out, tolerating clear liquid diet, ok to advance to regular.   Still having copious amounts of diarrhea. stool softeners are stopped. GI PCR was negative yesterday. However, has had ~15 episodes of diarrhea. Will plan to re-send stool for c.diff as last sample was from 48 hrs ago.   1L bolus for presumed dehydration from the diarrhea.     Jackie Washington M.D., M.S.  Dept of Trauma, Acute and Critical Care
ATTENDING ATTESTATION  I have seen and examined this patient on rounds thismorning with the surgery team. I have reviewed all new labs, imaging and reports. I have participated in formulating the plan for the day, and have read and agree with the history, ROS, exam, assessment and plan as stated above.     POD 3 from conchita patch for perforated duodenal ulcer.   1. hypophosphatemia: aggressive IV repletion with serial follow up labs. Discussed possible need for SICU transfer if poor response to IV infusion.   2. NPO, NGT to suction. UGI on POD 6 prior to removal of NGT and feeding.   3. F/U H. pylori serology.     Jackie Washington M.D., M.S.  Dept of Trauma, Acute and Critical Care
ATTENDING ATTESTATION  I have seen and examined this patient on rounds thismorning with the surgery team. I have reviewed all new labs, imaging and reports. I have participated in formulating the plan for the day, and have read and agree with the history, ROS, exam, assessment and plan as stated above.     Diarrhea is improving, no infectious source identified. May be simply post-ileus.  leukocytosis improving with bolus yesterday, likely represented dehydration.  Will continue to observe until normal bowel function has resumed.     Jackie Washington M.D., M.S.  Dept of Trauma, Acute and Critical Care
Patient seen and examined and agree with above.   s/p 8/19- ex lap, Corby Patch with course complicated by intraabdominal abscess s/p IR drainage 8/30.    Normal WBC at 7.57  Continuing on ertapenem.  IR drain studies today with perihepatic drain removed as there was a collapsed cavity.   Surgical drain will remain in place.   Will continue diet and monitor drainage from wound.
Pt seen and examined with ACS team, agree with above.  Pt feeling well, pain well-controlled. Labs noted. Dressing clean. DINAH had high output overnight but is not bilious today - serosanguinous fluid noted.
Patient seen and examined and agree with above.   s/p 8/19- ex lap, Corby Patch with course complicated by intraabdominal abscess s/p IR drainage 8/30.  Patient has been afebrile with Tmax 100.1.   No complaints, was walking in the [place] today.   Normal WBC at 7.98.  Continuing on ertapenem.  Abdominal wound continues to have purulent drainage.   Tolerating regular diet.   Will likely repeat CT abdomen and pelvis to assess intraabdominal abscess 4-5 days after last one.
Pt is a 67 year old male s/p Corby patch repair of a perforated DU. His post operative course was complicated by an intraabdominal abscess. Pt is s/p IR drainage. Drain with purulent fluid. No acute events overnight.    - Wound with purulent drainage, opened and packed.  - Abdominal exam without peritonitis.  - WBC normalized.    Continue IV abx  Continue wound care  Hypophosphatemia, replace and monitor.  CT abdomen reveals decrease in size of two collection and increase in size of one collection  Will consult with VIR  Continue supportive care  Discharge planning.
Pt is a 67 year old male s/p Corby patch repair of a perforated DU. His post operative course was complicated by an intraabdominal abscess. Pt is s/p IR drainage. Drain with purulent fluid. No acute events overnight.    - Wound with purulent drainage, opened and packed.  - Abdominal exam without peritonitis.  - WBC normalized.    Continue IV abx  Continue wound care  Hypophosphatemia, replace and monitor.  For repeat CT C/A/P today.
Pt is a 67 year old male s/p Corby patch repair of a perforated DU. His post operative course was complicated by an intraabdominal abscess. Pt is s/p IR drainage. Drain with purulent fluid. No acute events overnight.    - Wound with purulent drainage, opened and packed.  - Abdominal exam without peritonitis.  - WBC normalized.    Continue IV abx  Continue wound care  For repeat CT C/A/P tomorrow
68 yo male s/p Corby patch repair, now with intraabdominal abscess.  - S/p IR drainage. Drain with purulent fluid.  - Wound with purulent drainage, opened and packed.  - Abdominal exam without peritonitis.  - WBC normalized.  - Continue IVF and IV abx.
ATTENDING ATTESTATION  I have seen and examined this patient on rounds thismorning with the surgery team. I have reviewed all new labs, imaging and reports. I have participated in formulating the plan for the day, and have read and agree with the history, ROS, exam, assessment and plan as stated above.     Superficial wound infection: several midline staples removed, wound packed.   To complete 4 days of Zosyn today.   Phos is better after aggressive repletion yesterday, will continue today and follow up labs after each repletion.   Planned UGIS POD 5 tomorrow.     Jackie Washington M.D., M.S.  Dept of Trauma, Acute and Critical Care
Pt is a 67 year old male s/p Corby patch repair of a perforated DU. His post operative course was complicated by an intraabdominal abscess. Pt is s/p IR drainage. Drain with purulent fluid. No acute events overnight.    - Wound with purulent drainage, opened and packed.  - Abdominal exam without peritonitis.  - WBC normalized.    Continue IV abx  Continue wound care  Hypophosphatemia, replace and monitor.  CT abdomen reveals decrease in size of two collection and increase in size of one collection  VIR plans drain study on Tuesday  Continue supportive care  Discharge planning.
Pt seen and examined with ACS team on 8/24. Pt doing well, pain well-controlled, getting out of bed. Using , discussed plan for UGIS on POD5. Questions answered. D/c YOSEPH tsang. Severe hypophosphatemia- replete and recheck.
ATTENDING ATTESTATION  I have seen and examined this patient on rounds thismorning with the surgery team. I have reviewed all new labs, imaging and reports. I have participated in formulating the plan for the day, and have read and agree with the history, ROS, exam, assessment and plan as stated above.     Slight increase in WBC from 9-->12 today with a single fever. Likely representative of superficial wound infection yesterday which was opened and packed. Midline has nearly resolved erythema today. Will send UA and CXR to make sure we are not missing another source of infection. Will re-check WBC tomorrow. Patient appears clinically well. Zosyn finished yesterday, will not restart unless further fevers of WBC continues to rise.   Swallow study today with probable removal of NGT if no leak from conchita patch.   Suppository for constipation.   Continue hypophophaetemia, though improved. Will double phos infusion today.     Jackie Washington M.D., M.S.  Dept of Trauma, Acute and Critical Care

## 2022-09-07 LAB
ANION GAP SERPL CALC-SCNC: 9 MMOL/L — SIGNIFICANT CHANGE UP (ref 5–17)
BUN SERPL-MCNC: 13 MG/DL — SIGNIFICANT CHANGE UP (ref 7–23)
CALCIUM SERPL-MCNC: 8.5 MG/DL — SIGNIFICANT CHANGE UP (ref 8.4–10.5)
CHLORIDE SERPL-SCNC: 99 MMOL/L — SIGNIFICANT CHANGE UP (ref 96–108)
CO2 SERPL-SCNC: 25 MMOL/L — SIGNIFICANT CHANGE UP (ref 22–31)
CREAT SERPL-MCNC: 0.8 MG/DL — SIGNIFICANT CHANGE UP (ref 0.5–1.3)
EGFR: 96 ML/MIN/1.73M2 — SIGNIFICANT CHANGE UP
GLUCOSE SERPL-MCNC: 110 MG/DL — HIGH (ref 70–99)
HCT VFR BLD CALC: 34.6 % — LOW (ref 39–50)
HGB BLD-MCNC: 11.1 G/DL — LOW (ref 13–17)
MAGNESIUM SERPL-MCNC: 2.2 MG/DL — SIGNIFICANT CHANGE UP (ref 1.6–2.6)
MCHC RBC-ENTMCNC: 29.2 PG — SIGNIFICANT CHANGE UP (ref 27–34)
MCHC RBC-ENTMCNC: 32.1 GM/DL — SIGNIFICANT CHANGE UP (ref 32–36)
MCV RBC AUTO: 91.1 FL — SIGNIFICANT CHANGE UP (ref 80–100)
NRBC # BLD: 0 /100 WBCS — SIGNIFICANT CHANGE UP (ref 0–0)
PHOSPHATE SERPL-MCNC: 2.5 MG/DL — SIGNIFICANT CHANGE UP (ref 2.5–4.5)
PLATELET # BLD AUTO: 459 K/UL — HIGH (ref 150–400)
POTASSIUM SERPL-MCNC: 4.2 MMOL/L — SIGNIFICANT CHANGE UP (ref 3.5–5.3)
POTASSIUM SERPL-SCNC: 4.2 MMOL/L — SIGNIFICANT CHANGE UP (ref 3.5–5.3)
RBC # BLD: 3.8 M/UL — LOW (ref 4.2–5.8)
RBC # FLD: 12.6 % — SIGNIFICANT CHANGE UP (ref 10.3–14.5)
SODIUM SERPL-SCNC: 133 MMOL/L — LOW (ref 135–145)
WBC # BLD: 7.82 K/UL — SIGNIFICANT CHANGE UP (ref 3.8–10.5)
WBC # FLD AUTO: 7.82 K/UL — SIGNIFICANT CHANGE UP (ref 3.8–10.5)

## 2022-09-07 PROCEDURE — 74177 CT ABD & PELVIS W/CONTRAST: CPT | Mod: 26

## 2022-09-07 RX ORDER — PANTOPRAZOLE SODIUM 20 MG/1
40 TABLET, DELAYED RELEASE ORAL
Refills: 0 | Status: DISCONTINUED | OUTPATIENT
Start: 2022-09-07 | End: 2022-09-08

## 2022-09-07 RX ADMIN — Medication 85 MILLIMOLE(S): at 11:16

## 2022-09-07 RX ADMIN — PANTOPRAZOLE SODIUM 40 MILLIGRAM(S): 20 TABLET, DELAYED RELEASE ORAL at 17:07

## 2022-09-07 RX ADMIN — ENOXAPARIN SODIUM 40 MILLIGRAM(S): 100 INJECTION SUBCUTANEOUS at 17:06

## 2022-09-07 RX ADMIN — ERTAPENEM SODIUM 120 MILLIGRAM(S): 1 INJECTION, POWDER, LYOPHILIZED, FOR SOLUTION INTRAMUSCULAR; INTRAVENOUS at 10:41

## 2022-09-07 RX ADMIN — PANTOPRAZOLE SODIUM 40 MILLIGRAM(S): 20 TABLET, DELAYED RELEASE ORAL at 05:05

## 2022-09-07 RX ADMIN — PHENYLEPHRINE-SHARK LIVER OIL-MINERAL OIL-PETROLATUM RECTAL OINTMENT 1 APPLICATION(S): at 05:06

## 2022-09-07 NOTE — PROGRESS NOTE ADULT - ASSESSMENT
67 y Male patient S/P exploratory laparotomy for pneumoperitoneum 2/2 duodenal perforation with new found evidence of perihepatic enhancing mass (likely abscess), s/p IR drainage (8/30).    Plan:  - IR tube removed 9/6  - f/u repeat CT  - Pain control  - Regular diet  - Protonix BID  - Continue to replete electrolytes  - Continue ertapenem, monitor for clinical signs of infection  - DVT ppx  - OOB  - preparations H for hemorrhoid pain   - dispo planning    ACS  [id number] 67 y Male patient S/P exploratory laparotomy for pneumoperitoneum 2/2 duodenal perforation with new found evidence of perihepatic enhancing mass (likely abscess), s/p IR drainage (8/30). His Perihepatic drain was removed by IR 9/6. He is stable and pain controlled.    Plan:  - f/u CT Abdomen/pelvis  - Pain control  - Regular diet  - Protonix BID  - Continue to replete electrolytes  - Continue ertapenem, monitor for clinical signs of infection  - DVT ppx  - OOB  - preparations H for hemorrhoid pain   - dispo planning    ACS  [id number]

## 2022-09-07 NOTE — PROGRESS NOTE ADULT - SUBJECTIVE AND OBJECTIVE BOX
Surgery Progress Note      SUBJECTIVE: Patient seen and examined at bedside with surgical team. No acute events overnight.     OBJECTIVE:    Vital Signs Last 24 Hrs  T(C): 37.3 (07 Sep 2022 00:05), Max: 37.6 (06 Sep 2022 20:28)  T(F): 99.2 (07 Sep 2022 00:05), Max: 99.7 (06 Sep 2022 20:28)  HR: 81 (07 Sep 2022 00:05) (73 - 85)  BP: 121/59 (07 Sep 2022 00:05) (109/62 - 121/66)  BP(mean): --  RR: 18 (07 Sep 2022 00:05) (16 - 18)  SpO2: 98% (07 Sep 2022 00:05) (95% - 99%)    Parameters below as of 07 Sep 2022 00:05  Patient On (Oxygen Delivery Method): room air    I&O's Detail    05 Sep 2022 07:01  -  06 Sep 2022 07:00  --------------------------------------------------------  IN:    Oral Fluid: 940 mL  Total IN: 940 mL    OUT:    Bulb (mL): 0 mL    Drain (mL): 0 mL    Voided (mL): 200 mL  Total OUT: 200 mL    Total NET: 740 mL      06 Sep 2022 07:01  -  07 Sep 2022 00:59  --------------------------------------------------------  IN:    Oral Fluid: 585 mL  Total IN: 585 mL    OUT:    Voided (mL): 400 mL  Total OUT: 400 mL    Total NET: 185 mL      MEDICATIONS  (STANDING):  enoxaparin Injectable 40 milliGRAM(s) SubCutaneous every 24 hours  ertapenem  IVPB      ertapenem  IVPB 1000 milliGRAM(s) IV Intermittent every 24 hours  hemorrhoidal Ointment 1 Application(s) Rectal two times a day  pantoprazole  Injectable 40 milliGRAM(s) IV Push two times a day    MEDICATIONS  (PRN):  acetaminophen     Tablet .. 650 milliGRAM(s) Oral every 6 hours PRN Temp greater or equal to 38C (100.4F), Mild Pain (1 - 3)      PHYSICAL EXAM:  Constitutional: A&Ox3, NAD  Respiratory: Unlabored breathing  Abdomen: Soft, nondistended, nontender. No rebound or guarding.  Extremities: WWP, LOREDO spontaneously    LABS:                        11.5   7.57  )-----------( 503      ( 06 Sep 2022 06:04 )             35.3     09-06    132<L>  |  98  |  15  ----------------------------<  124<H>  4.2   |  25  |  0.76    Ca    8.5      06 Sep 2022 06:04  Phos  2.8     09-06  Mg     2.3     09-06                 Surgery Progress Note      SUBJECTIVE: Patient seen and examined at bedside with surgical team. No acute events overnight. He is laying in bed comfortably. His pain is controlled. He denies any CP, SOB, fever, chills, n/v/d      OBJECTIVE:    Vital Signs Last 24 Hrs  T(C): 37.3 (07 Sep 2022 00:05), Max: 37.6 (06 Sep 2022 20:28)  T(F): 99.2 (07 Sep 2022 00:05), Max: 99.7 (06 Sep 2022 20:28)  HR: 81 (07 Sep 2022 00:05) (73 - 85)  BP: 121/59 (07 Sep 2022 00:05) (109/62 - 121/66)  BP(mean): --  RR: 18 (07 Sep 2022 00:05) (16 - 18)  SpO2: 98% (07 Sep 2022 00:05) (95% - 99%)    Parameters below as of 07 Sep 2022 00:05  Patient On (Oxygen Delivery Method): room air    I&O's Detail    05 Sep 2022 07:01  -  06 Sep 2022 07:00  --------------------------------------------------------  IN:    Oral Fluid: 940 mL  Total IN: 940 mL    OUT:    Bulb (mL): 0 mL    Drain (mL): 0 mL    Voided (mL): 200 mL  Total OUT: 200 mL    Total NET: 740 mL      06 Sep 2022 07:01  -  07 Sep 2022 00:59  --------------------------------------------------------  IN:    Oral Fluid: 585 mL  Total IN: 585 mL    OUT:    Voided (mL): 400 mL  Total OUT: 400 mL    Total NET: 185 mL      MEDICATIONS  (STANDING):  enoxaparin Injectable 40 milliGRAM(s) SubCutaneous every 24 hours  ertapenem  IVPB      ertapenem  IVPB 1000 milliGRAM(s) IV Intermittent every 24 hours  hemorrhoidal Ointment 1 Application(s) Rectal two times a day  pantoprazole  Injectable 40 milliGRAM(s) IV Push two times a day    MEDICATIONS  (PRN):  acetaminophen     Tablet .. 650 milliGRAM(s) Oral every 6 hours PRN Temp greater or equal to 38C (100.4F), Mild Pain (1 - 3)      PHYSICAL EXAM:  Constitutional: A&Ox3, NAD  Respiratory: Unlabored breathing  Abdomen: Soft, nondistended, nontender. No rebound or guarding, midline incision draining pus, staples in intact, midline incision packed with iodoform, R DINAH drain intact with no output  Extremities: WWP, LOREDO spontaneously    LABS:                        11.5   7.57  )-----------( 503      ( 06 Sep 2022 06:04 )             35.3     09-06    132<L>  |  98  |  15  ----------------------------<  124<H>  4.2   |  25  |  0.76    Ca    8.5      06 Sep 2022 06:04  Phos  2.8     09-06  Mg     2.3     09-06

## 2022-09-08 ENCOUNTER — TRANSCRIPTION ENCOUNTER (OUTPATIENT)
Age: 67
End: 2022-09-08

## 2022-09-08 VITALS
TEMPERATURE: 100 F | HEART RATE: 83 BPM | RESPIRATION RATE: 18 BRPM | OXYGEN SATURATION: 98 % | DIASTOLIC BLOOD PRESSURE: 68 MMHG | SYSTOLIC BLOOD PRESSURE: 112 MMHG

## 2022-09-08 LAB
ANION GAP SERPL CALC-SCNC: 10 MMOL/L — SIGNIFICANT CHANGE UP (ref 5–17)
BUN SERPL-MCNC: 12 MG/DL — SIGNIFICANT CHANGE UP (ref 7–23)
CALCIUM SERPL-MCNC: 8.9 MG/DL — SIGNIFICANT CHANGE UP (ref 8.4–10.5)
CHLORIDE SERPL-SCNC: 97 MMOL/L — SIGNIFICANT CHANGE UP (ref 96–108)
CO2 SERPL-SCNC: 26 MMOL/L — SIGNIFICANT CHANGE UP (ref 22–31)
CREAT SERPL-MCNC: 0.68 MG/DL — SIGNIFICANT CHANGE UP (ref 0.5–1.3)
EGFR: 101 ML/MIN/1.73M2 — SIGNIFICANT CHANGE UP
GLUCOSE SERPL-MCNC: 130 MG/DL — HIGH (ref 70–99)
HCT VFR BLD CALC: 38.7 % — LOW (ref 39–50)
HGB BLD-MCNC: 12.3 G/DL — LOW (ref 13–17)
MAGNESIUM SERPL-MCNC: 2.2 MG/DL — SIGNIFICANT CHANGE UP (ref 1.6–2.6)
MCHC RBC-ENTMCNC: 28.9 PG — SIGNIFICANT CHANGE UP (ref 27–34)
MCHC RBC-ENTMCNC: 31.8 GM/DL — LOW (ref 32–36)
MCV RBC AUTO: 91.1 FL — SIGNIFICANT CHANGE UP (ref 80–100)
NRBC # BLD: 0 /100 WBCS — SIGNIFICANT CHANGE UP (ref 0–0)
PHOSPHATE SERPL-MCNC: 3.2 MG/DL — SIGNIFICANT CHANGE UP (ref 2.5–4.5)
PLATELET # BLD AUTO: 424 K/UL — HIGH (ref 150–400)
POTASSIUM SERPL-MCNC: 4.2 MMOL/L — SIGNIFICANT CHANGE UP (ref 3.5–5.3)
POTASSIUM SERPL-SCNC: 4.2 MMOL/L — SIGNIFICANT CHANGE UP (ref 3.5–5.3)
PROCALCITONIN SERPL-MCNC: 0.07 NG/ML — SIGNIFICANT CHANGE UP (ref 0.02–0.1)
RBC # BLD: 4.25 M/UL — SIGNIFICANT CHANGE UP (ref 4.2–5.8)
RBC # FLD: 12.5 % — SIGNIFICANT CHANGE UP (ref 10.3–14.5)
SARS-COV-2 RNA SPEC QL NAA+PROBE: SIGNIFICANT CHANGE UP
SODIUM SERPL-SCNC: 133 MMOL/L — LOW (ref 135–145)
WBC # BLD: 7.66 K/UL — SIGNIFICANT CHANGE UP (ref 3.8–10.5)
WBC # FLD AUTO: 7.66 K/UL — SIGNIFICANT CHANGE UP (ref 3.8–10.5)

## 2022-09-08 PROCEDURE — 85027 COMPLETE CBC AUTOMATED: CPT

## 2022-09-08 PROCEDURE — 84295 ASSAY OF SERUM SODIUM: CPT

## 2022-09-08 PROCEDURE — 82803 BLOOD GASES ANY COMBINATION: CPT

## 2022-09-08 PROCEDURE — 97116 GAIT TRAINING THERAPY: CPT

## 2022-09-08 PROCEDURE — 87077 CULTURE AEROBIC IDENTIFY: CPT

## 2022-09-08 PROCEDURE — 87205 SMEAR GRAM STAIN: CPT

## 2022-09-08 PROCEDURE — 83605 ASSAY OF LACTIC ACID: CPT

## 2022-09-08 PROCEDURE — 84132 ASSAY OF SERUM POTASSIUM: CPT

## 2022-09-08 PROCEDURE — 82947 ASSAY GLUCOSE BLOOD QUANT: CPT

## 2022-09-08 PROCEDURE — 85018 HEMOGLOBIN: CPT

## 2022-09-08 PROCEDURE — 49423 EXCHANGE DRAINAGE CATHETER: CPT

## 2022-09-08 PROCEDURE — 86803 HEPATITIS C AB TEST: CPT

## 2022-09-08 PROCEDURE — 85025 COMPLETE CBC W/AUTO DIFF WBC: CPT

## 2022-09-08 PROCEDURE — U0003: CPT

## 2022-09-08 PROCEDURE — 76080 X-RAY EXAM OF FISTULA: CPT | Mod: 59

## 2022-09-08 PROCEDURE — U0005: CPT

## 2022-09-08 PROCEDURE — 49406 IMAGE CATH FLUID PERI/RETRO: CPT

## 2022-09-08 PROCEDURE — 87070 CULTURE OTHR SPECIMN AEROBIC: CPT

## 2022-09-08 PROCEDURE — 74174 CTA ABD&PLVS W/CONTRAST: CPT | Mod: MA

## 2022-09-08 PROCEDURE — 85014 HEMATOCRIT: CPT

## 2022-09-08 PROCEDURE — 83735 ASSAY OF MAGNESIUM: CPT

## 2022-09-08 PROCEDURE — 80048 BASIC METABOLIC PNL TOTAL CA: CPT

## 2022-09-08 PROCEDURE — 82330 ASSAY OF CALCIUM: CPT

## 2022-09-08 PROCEDURE — 71045 X-RAY EXAM CHEST 1 VIEW: CPT

## 2022-09-08 PROCEDURE — C9399: CPT

## 2022-09-08 PROCEDURE — C1729: CPT

## 2022-09-08 PROCEDURE — 86850 RBC ANTIBODY SCREEN: CPT

## 2022-09-08 PROCEDURE — 82247 BILIRUBIN TOTAL: CPT

## 2022-09-08 PROCEDURE — 87186 SC STD MICRODIL/AGAR DIL: CPT

## 2022-09-08 PROCEDURE — 81001 URINALYSIS AUTO W/SCOPE: CPT

## 2022-09-08 PROCEDURE — 87449 NOS EACH ORGANISM AG IA: CPT

## 2022-09-08 PROCEDURE — 84100 ASSAY OF PHOSPHORUS: CPT

## 2022-09-08 PROCEDURE — 86900 BLOOD TYPING SEROLOGIC ABO: CPT

## 2022-09-08 PROCEDURE — 86677 HELICOBACTER PYLORI ANTIBODY: CPT

## 2022-09-08 PROCEDURE — 80053 COMPREHEN METABOLIC PANEL: CPT

## 2022-09-08 PROCEDURE — 97161 PT EVAL LOW COMPLEX 20 MIN: CPT

## 2022-09-08 PROCEDURE — 96374 THER/PROPH/DIAG INJ IV PUSH: CPT

## 2022-09-08 PROCEDURE — 71275 CT ANGIOGRAPHY CHEST: CPT | Mod: MA

## 2022-09-08 PROCEDURE — 84145 PROCALCITONIN (PCT): CPT

## 2022-09-08 PROCEDURE — C1769: CPT

## 2022-09-08 PROCEDURE — 83615 LACTATE (LD) (LDH) ENZYME: CPT

## 2022-09-08 PROCEDURE — 86901 BLOOD TYPING SEROLOGIC RH(D): CPT

## 2022-09-08 PROCEDURE — 97530 THERAPEUTIC ACTIVITIES: CPT

## 2022-09-08 PROCEDURE — 83690 ASSAY OF LIPASE: CPT

## 2022-09-08 PROCEDURE — 74246 X-RAY XM UPR GI TRC 2CNTRST: CPT

## 2022-09-08 PROCEDURE — 85610 PROTHROMBIN TIME: CPT

## 2022-09-08 PROCEDURE — 97110 THERAPEUTIC EXERCISES: CPT

## 2022-09-08 PROCEDURE — 82435 ASSAY OF BLOOD CHLORIDE: CPT

## 2022-09-08 PROCEDURE — 75984 XRAY CONTROL CATHETER CHANGE: CPT | Mod: 26

## 2022-09-08 PROCEDURE — 49424 ASSESS CYST CONTRAST INJECT: CPT

## 2022-09-08 PROCEDURE — 85730 THROMBOPLASTIN TIME PARTIAL: CPT

## 2022-09-08 PROCEDURE — 74177 CT ABD & PELVIS W/CONTRAST: CPT

## 2022-09-08 PROCEDURE — 87324 CLOSTRIDIUM AG IA: CPT

## 2022-09-08 PROCEDURE — 84484 ASSAY OF TROPONIN QUANT: CPT

## 2022-09-08 PROCEDURE — 71260 CT THORAX DX C+: CPT

## 2022-09-08 PROCEDURE — 75984 XRAY CONTROL CATHETER CHANGE: CPT

## 2022-09-08 PROCEDURE — 36415 COLL VENOUS BLD VENIPUNCTURE: CPT

## 2022-09-08 PROCEDURE — 83880 ASSAY OF NATRIURETIC PEPTIDE: CPT

## 2022-09-08 PROCEDURE — 99285 EMERGENCY DEPT VISIT HI MDM: CPT | Mod: 25

## 2022-09-08 PROCEDURE — 80076 HEPATIC FUNCTION PANEL: CPT

## 2022-09-08 PROCEDURE — 74018 RADEX ABDOMEN 1 VIEW: CPT

## 2022-09-08 PROCEDURE — 87507 IADNA-DNA/RNA PROBE TQ 12-25: CPT

## 2022-09-08 PROCEDURE — 96375 TX/PRO/DX INJ NEW DRUG ADDON: CPT

## 2022-09-08 RX ORDER — PANTOPRAZOLE SODIUM 20 MG/1
1 TABLET, DELAYED RELEASE ORAL
Qty: 28 | Refills: 0
Start: 2022-09-08 | End: 2022-09-21

## 2022-09-08 RX ORDER — ACETAMINOPHEN 500 MG
2 TABLET ORAL
Qty: 0 | Refills: 0 | DISCHARGE
Start: 2022-09-08

## 2022-09-08 RX ORDER — PHENYLEPHRINE-SHARK LIVER OIL-MINERAL OIL-PETROLATUM RECTAL OINTMENT
1 OINTMENT (GRAM) RECTAL
Qty: 28 | Refills: 0
Start: 2022-09-08 | End: 2022-09-14

## 2022-09-08 RX ORDER — MOXIFLOXACIN HYDROCHLORIDE TABLETS, 400 MG 400 MG/1
1 TABLET, FILM COATED ORAL
Qty: 28 | Refills: 0
Start: 2022-09-08 | End: 2022-09-21

## 2022-09-08 RX ADMIN — PHENYLEPHRINE-SHARK LIVER OIL-MINERAL OIL-PETROLATUM RECTAL OINTMENT 1 APPLICATION(S): at 17:38

## 2022-09-08 RX ADMIN — PANTOPRAZOLE SODIUM 40 MILLIGRAM(S): 20 TABLET, DELAYED RELEASE ORAL at 05:30

## 2022-09-08 RX ADMIN — ENOXAPARIN SODIUM 40 MILLIGRAM(S): 100 INJECTION SUBCUTANEOUS at 17:38

## 2022-09-08 RX ADMIN — PANTOPRAZOLE SODIUM 40 MILLIGRAM(S): 20 TABLET, DELAYED RELEASE ORAL at 17:37

## 2022-09-08 RX ADMIN — PHENYLEPHRINE-SHARK LIVER OIL-MINERAL OIL-PETROLATUM RECTAL OINTMENT 1 APPLICATION(S): at 05:30

## 2022-09-08 RX ADMIN — ERTAPENEM SODIUM 120 MILLIGRAM(S): 1 INJECTION, POWDER, LYOPHILIZED, FOR SOLUTION INTRAMUSCULAR; INTRAVENOUS at 10:38

## 2022-09-08 NOTE — CHART NOTE - NSCHARTNOTESELECT_GEN_ALL_CORE
Nutrition Services
Post Procedure Check/Event Note
IR
Nutrition Services
Post Procedure Check/Event Note
Post-operative check/Event Note

## 2022-09-08 NOTE — PROGRESS NOTE ADULT - REASON FOR ADMISSION
pneumoperitoneum

## 2022-09-08 NOTE — CONSULT NOTE ADULT - SUBJECTIVE AND OBJECTIVE BOX
Interventional Radiology  Evaluate for Procedure: Surgical Drain Reposition    HPI: 67 y Male patient S/P exploratory laparotomy for pneumoperitoneum 2/2 duodenal perforation with new found evidence of perihepatic enhancing mass (likely abscess), s/p IR drainage (8/30). His Perihepatic drain was removed by IR 9/6. Surgical drain remains in place not draining collection completely; IR consulted for repositioning.    Allergies: NKDA  Medications (Abx/Cardiac/Anticoagulation/Blood Products)  enoxaparin Injectable: 40 milliGRAM(s) SubCutaneous (09-07 @ 17:06)  ertapenem  IVPB: 120 mL/Hr IV Intermittent (09-07 @ 10:41)    Data:  T(C): 37.2  HR: 84  BP: 108/64  RR: 18  SpO2: 95%    -WBC 7.66 / HgB 12.3 / Hct 38.7 / Plt 424  -Na 133 / Cl 97 / BUN 12 / Glucose 130  -K 4.2 / CO2 26 / Cr 0.68  -ALT -- / Alk Phos -- / T.Bili --  -INR 1.28 / PTT 38.6    Radiology: reviewed    Assessment/Plan: 67 y Male patient S/P exploratory laparotomy for pneumoperitoneum 2/2 duodenal perforation with new found evidence of perihepatic enhancing mass (likely abscess), s/p IR drainage (8/30). His Perihepatic drain was removed by IR 9/6. Surgical drain remains in place not draining collection completely; IR consulted for repositioning.    - case reviewed with IR attending Dr. Kennedy and approved for tomorrow 9/9  - please place IR procedure order under ARMIDA Velázquez  - STAT labs in AM (cbc,coags, bmp, T&S)  - hold AC   - does not need to be NPO  - COVID PCR results within 5 days of planned procedure  - d/w primary team    Suzanne Velázquez NP  Available on Teams

## 2022-09-08 NOTE — DISCHARGE NOTE PROVIDER - NSDCFUADDAPPT_GEN_ALL_CORE_FT
Please make an appointment and follow up with your Primary Care Physician in 1-2 weeks    Please make an appointment and follow up outpatient with Dr. Lamb in 1-2 weeks    Will need a repeat CT abdomen scan after antibiotic therapy

## 2022-09-08 NOTE — PROGRESS NOTE ADULT - SUBJECTIVE AND OBJECTIVE BOX
Surgery Progress Note      SUBJECTIVE: Patient seen and examined at bedside with surgical team. No acute events overnight.     OBJECTIVE:    Vital Signs Last 24 Hrs  T(C): 36.9 (08 Sep 2022 00:21), Max: 37.3 (07 Sep 2022 16:30)  T(F): 98.5 (08 Sep 2022 00:21), Max: 99.2 (07 Sep 2022 16:30)  HR: 77 (08 Sep 2022 00:21) (76 - 91)  BP: 120/68 (08 Sep 2022 00:21) (102/64 - 120/72)  BP(mean): --  RR: 18 (08 Sep 2022 00:21) (18 - 18)  SpO2: 98% (07 Sep 2022 20:40) (97% - 98%)    Parameters below as of 07 Sep 2022 20:40  Patient On (Oxygen Delivery Method): room air    I&O's Detail    06 Sep 2022 07:01  -  07 Sep 2022 07:00  --------------------------------------------------------  IN:    Oral Fluid: 585 mL  Total IN: 585 mL    OUT:    Bulb (mL): 0 mL    Voided (mL): 400 mL  Total OUT: 400 mL    Total NET: 185 mL      07 Sep 2022 07:01  -  08 Sep 2022 00:42  --------------------------------------------------------  IN:    Oral Fluid: 840 mL  Total IN: 840 mL    OUT:    Blood Loss (mL): 0 mL    Bulb (mL): 0 mL    Emesis (mL): 0 mL    Stool (mL): 0 mL    Voided (mL): 500 mL  Total OUT: 500 mL    Total NET: 340 mL      MEDICATIONS  (STANDING):  enoxaparin Injectable 40 milliGRAM(s) SubCutaneous every 24 hours  ertapenem  IVPB      ertapenem  IVPB 1000 milliGRAM(s) IV Intermittent every 24 hours  hemorrhoidal Ointment 1 Application(s) Rectal two times a day  pantoprazole    Tablet 40 milliGRAM(s) Oral two times a day    MEDICATIONS  (PRN):  acetaminophen     Tablet .. 650 milliGRAM(s) Oral every 6 hours PRN Temp greater or equal to 38C (100.4F), Mild Pain (1 - 3)      PHYSICAL EXAM:  Constitutional: A&Ox3, NAD  Respiratory: Unlabored breathing  Abdomen: Soft, nondistended, nontender. No rebound or guarding.  Extremities: WWP, LOREDO spontaneously    LABS:                        11.1   7.82  )-----------( 459      ( 07 Sep 2022 07:21 )             34.6     09-07    133<L>  |  99  |  13  ----------------------------<  110<H>  4.2   |  25  |  0.80    Ca    8.5      07 Sep 2022 07:21  Phos  2.5     09-07  Mg     2.2     09-07                 Surgery Progress Note      SUBJECTIVE: Patient seen and examined at bedside with surgical team, appears comfortable. No acute events overnight.     OBJECTIVE:    Vital Signs Last 24 Hrs  T(C): 36.9 (08 Sep 2022 00:21), Max: 37.3 (07 Sep 2022 16:30)  T(F): 98.5 (08 Sep 2022 00:21), Max: 99.2 (07 Sep 2022 16:30)  HR: 77 (08 Sep 2022 00:21) (76 - 91)  BP: 120/68 (08 Sep 2022 00:21) (102/64 - 120/72)  BP(mean): --  RR: 18 (08 Sep 2022 00:21) (18 - 18)  SpO2: 98% (07 Sep 2022 20:40) (97% - 98%)    Parameters below as of 07 Sep 2022 20:40  Patient On (Oxygen Delivery Method): room air    I&O's Detail    06 Sep 2022 07:01  -  07 Sep 2022 07:00  --------------------------------------------------------  IN:    Oral Fluid: 585 mL  Total IN: 585 mL    OUT:    Bulb (mL): 0 mL    Voided (mL): 400 mL  Total OUT: 400 mL    Total NET: 185 mL      07 Sep 2022 07:01  -  08 Sep 2022 00:42  --------------------------------------------------------  IN:    Oral Fluid: 840 mL  Total IN: 840 mL    OUT:    Blood Loss (mL): 0 mL    Bulb (mL): 0 mL    Emesis (mL): 0 mL    Stool (mL): 0 mL    Voided (mL): 500 mL  Total OUT: 500 mL    Total NET: 340 mL      MEDICATIONS  (STANDING):  enoxaparin Injectable 40 milliGRAM(s) SubCutaneous every 24 hours  ertapenem  IVPB      ertapenem  IVPB 1000 milliGRAM(s) IV Intermittent every 24 hours  hemorrhoidal Ointment 1 Application(s) Rectal two times a day  pantoprazole    Tablet 40 milliGRAM(s) Oral two times a day    MEDICATIONS  (PRN):  acetaminophen     Tablet .. 650 milliGRAM(s) Oral every 6 hours PRN Temp greater or equal to 38C (100.4F), Mild Pain (1 - 3)      PHYSICAL EXAM:  Constitutional: A&Ox3, NAD  Respiratory: Unlabored breathing  Abdomen: Soft, nondistended, nontender. No rebound or guarding. DINAH x 1 on right side, midline incision with packing   Extremities: WWP, LOREDO spontaneously    LABS:                        11.1   7.82  )-----------( 459      ( 07 Sep 2022 07:21 )             34.6     09-07    133<L>  |  99  |  13  ----------------------------<  110<H>  4.2   |  25  |  0.80    Ca    8.5      07 Sep 2022 07:21  Phos  2.5     09-07  Mg     2.2     09-07

## 2022-09-08 NOTE — PRE PROCEDURE NOTE - PRE PROCEDURE EVALUATION
Interventional Radiology    HPI:  67 y Male patient S/P exploratory laparotomy for pneumoperitoneum 2/2 duodenal perforation with new found evidence of perihepatic enhancing mass (likely abscess), s/p IR drainage (8/30). His Perihepatic drain was removed by IR 9/6. Surgical drain remains in place not draining collection completely; Here for repositioning.       Allergies:   Medications (Abx/Cardiac/Anticoagulation/Blood Products)    enoxaparin Injectable: 40 milliGRAM(s) SubCutaneous (09-07 @ 17:06)  ertapenem  IVPB: 120 mL/Hr IV Intermittent (09-08 @ 10:38)    Data:    T(C): 37.4  HR: 84  BP: 102/65  RR: 18  SpO2: 98%    Nontraumatic perforation of intestine    Handoff    MEWS Score    No pertinent past medical history    Perforated duodenal ulcer    Pneumoperitoneum    Exploratory laparotomy    Small bowel perforation    Exploratory laparotomy    Closure of perforated duodenum using omental patch    No significant past surgical history    ABDOMINAL PAIN    Closure of perforated duodenum using omental patch    [email or web address]        Exam  General: No acute distress  Chest: Non labored breathing    -WBC 7.66 / HgB 12.3 / Hct 38.7 / Plt 424  -Na 133 / Cl 97 / BUN 12 / Glucose 130  -K 4.2 / CO2 26 / Cr 0.68  -ALT -- / Alk Phos -- / T.Bili --    Imaging:     Plan: 67y Male presents for Drain evaluation and repositioning.   -Risks/Benefits/alternatives explained with the patient  and witnessed informed consent obtained.

## 2022-09-08 NOTE — PROGRESS NOTE ADULT - PROVIDER SPECIALTY LIST ADULT
Anesthesia
Anesthesia
Trauma Surgery
Anesthesia
Intervent Radiology
Trauma Surgery
Anesthesia
Anesthesia
Trauma Surgery
Anesthesia
Trauma Surgery

## 2022-09-08 NOTE — DISCHARGE NOTE NURSING/CASE MANAGEMENT/SOCIAL WORK - PATIENT PORTAL LINK FT
You can access the FollowMyHealth Patient Portal offered by [facility] by registering at the following website: [email or web address]. By joining [facility]’s FollowMyHealth portal, you will also be able to view your health information using other applications (apps) compatible with our system.

## 2022-09-08 NOTE — DISCHARGE NOTE PROVIDER - HOSPITAL COURSE
67M remote history of gastric ulcer originally on omeprazole but since discontinued, recent tooth infection on amoxicillin, taking 600mg ibuprofen tid for pain, presenting with severe abdominal pain this morning. Pain has since radiated up to neck and ear. Patient denies nausea, vomiting, changes in bowel function. In ED, patient is hemodynamically stable, with no leukocytosis. CT scan notable for pneumoperitoneum, pyloric and proximal duodenal wall thickening with intramural gas likely representing viscus rupture. Pt was admitted to [facility]. He was    67M remote history of gastric ulcer originally on omeprazole but since discontinued, recent tooth infection on amoxicillin, taking 600mg ibuprofen tid for pain, presenting with severe abdominal pain this morning. Pain has since radiated up to neck and ear. Patient denies nausea, vomiting, changes in bowel function. In ED, patient is hemodynamically stable, with no leukocytosis. CT scan notable for pneumoperitoneum, pyloric and proximal duodenal wall thickening with intramural gas likely representing viscus rupture. Pt was admitted to [facility]. He was booked for emergent ex-lap. Pt received one dose of zosyn in the ED and IV abx was continued. Pt was made NPO and IVF started. H. pylori was sent. The pt went to the OR with Dr. Lamb for an ex lap and closure of perforated duodenum using an omental patch. The procedure went well without any complications. A DINAH drain and an NGTwas placed in the OR. The pt was transferred to the PACU in stable condition. In the PACU, the patients' pain was controlled and vitals stable. The patient was transferred to the surgical floor in stable condition. On POC the patient was having mild tenderness around the incision site and making appropriate urine in tsang bag. POD 1 the pt's pain is controlled with PCA, voiding appropriately in the Tsang bag. POD 2 tsang was discontinued, PT saw the patient and recommended home PT. POD 3 8/22: pt has hypophosphatemia and is aggressivley being repleted . The pt had an episode of abdominal distention and NGT was flushed, Tylenol was given. Erythema was noted around the midline staples and minimal pus was drained. 3 staples were removed.  POD 4 8/23: pt is having light red drainage from the NGT and IV protonix was started. POD 5 8/24: the pt had a low grade fever and BP was alittle elevated. His midline incision was cleaned and repacked this am, WBCs jumped (9.6 -> 11.5) continue to trend. An UGIS was ordered, phosphate was repleted. Dietary saw the patient and gave their recs. POD 6 8/25: phosphate continues to be repleted, a CXR was ordered and a C. diff was sent. POD 7 8/26: UGIS negative for leak/obstruction, PCA discontinued. POD 8 8/27-POD 9 8/28: will f/u C diff test, if negative c/s GI. POD 10 8/29: c.diff is negative. CT abdomen ordered and perihepatic collections found. IR was consulted for drainage. POD 11 8/30: Pt spiked a low grade fever. IR took the pt for an abdominal collection drainage and placed a 10.2Fr drain placement. On post procedure check the patients pain is controlled but he continues to have watery BMs and is passing gas. POD 12 8/31: overnight pt had a fever to 101.1 and given Tylenol. He is tolerating a regular diet. IR drain is being flushed daily. The pt's wife is concerned the pt has scants of blood in his BM. MD made aware and plan to address rectal hemorrhoids. POD 13 9/1: will f/u culture sensitivities and preparation H given for hemorrhoid pain. POD 14 9/2: pts leukocytosis resolved. CT ordered for abscess size monitor for clinical signs of infection. POD 15 9/3: CT A/P redemonstrated 3 intra-abdominal collections. POD 16 9/4-POD 17 9/5: plan for IR tube check Tuesday. POD 18 9/6: IR removed their drain and kept the DINAH drain in place. POD 19 9/7: Repeat CT ordered and showed a decrease in collection size with no drain output. POD 20 9/8 IR consulted for a drain exchange.     On day of discharge, the patient's vitals are stable, is tolerating a regular diet, voiding appropriately, ambulating well and pain controlled. The pt will be going home with a DINAH drain. He will be going home with [facility] for his wound care and home PT. Will go home with PO abx for 2 weeks. Will f/u with Dr. Lamb in 1-2 weeks

## 2022-09-08 NOTE — DISCHARGE NOTE PROVIDER - NSDCMRMEDTOKEN_GEN_ALL_CORE_FT
acetaminophen 325 mg oral tablet: 2 tab(s) orally every 6 hours, As needed, Temp greater or equal to 38C (100.4F), Mild Pain (1 - 3)  Cipro 500 mg oral tablet: 1 tab(s) orally every 12 hours   pantoprazole 40 mg oral delayed release tablet: 1 tab(s) orally 2 times a day  phenylephrine 0.25% rectal gel: 1 application rectal 4 times a day

## 2022-09-08 NOTE — DISCHARGE NOTE PROVIDER - CARE PROVIDER_API CALL
Sade Lamb (MD)  Surgery; Surgical Critical Care  [address] [address]  Phone: [phone]  Fax: [phone]  Follow Up Time: 1 week

## 2022-09-08 NOTE — DISCHARGE NOTE PROVIDER - NSDCCPCAREPLAN_GEN_ALL_CORE_FT
PRINCIPAL DISCHARGE DIAGNOSIS  Diagnosis: Small bowel perforation  Assessment and Plan of Treatment: Procedure: Exploratory Laparotomy with closure of perforated duodenum using omental patch  WOUND CARE: Staples will be removed at follow up office visit. Continue 1/2 inch iodoform packing in midline incision site twice a day if needed.   DINAH Drain: Keep drain in place, continue to empty drain as taught in hospital and recourd output. Drain will be removed in out patient office     BATHING: Please do not submerge wound underwater. You may shower and/or sponge bathe. Allow soapy water to run over incision site. Do not rub incision site. Pat dry when out of shower.  ACTIVITY: No heavy lifting anything more than 10-15lbs or straining. Otherwise, you may return to your usual level of physical activity. If you are taking narcotic pain medication (such as Percocet), do NOT drive a car, operate machinery or make important decisions.  DIET: Regular diet  NOTIFY YOUR SURGEON IF: You have any bleeding that does not stop, any pus draining from your wound, any fever (over 100.4 F) or chills, persistent nausea/vomiting with inability to tolerate food or liquids, persistent diarrhea, or if your pain is not controlled on your discharge pain medications.  FOLLOW-UP:  1. Please call to make a follow-up appointment with Dr. Lamb within one week of discharge   2. Please follow up with your primary care physician in one week regarding your hospitalization.

## 2022-09-08 NOTE — CHART NOTE - NSCHARTNOTEFT_GEN_A_CORE
Nutrition Follow Up Note  Patient seen for verbal consult: Pt daughter with questions regarding nutrition     Chart reviewed, events noted. This is a "67 y Male patient S/P exploratory laparotomy for pneumoperitoneum 2/2 duodenal perforation with new found evidence of perihepatic enhancing mass (likely abscess), s/p IR drainage (8/30). His Perihepatic drain was removed by IR 9/6. He is stable and pain controlled."  - plan for discharge today per team 09/08    Source:      [x] EMR        [] RN        [x] Family via phone (Pt daughter, Rosibel)    Diet, Regular:   Supplement Feeding Modality:  Oral  Ensure Enlive Cans or Servings Per Day:  1       Frequency:  Daily (09-01-22 @ 16:26) [Active]    - Is current order appropriate/adequate? [x] Yes  []  No:     - PO intake :   [x] >75%  Adequate    [] 50-75%  Fair       [] <50%  Poor    - Nutrition-related concerns:     - Pt daughter reports Pt now with good appetite/PO intake, ~% of meals consumed.      - Drinking Ensure Shake 1x/day (350 sherice, 20g pro)     - K+ Phos and Mg WNL today 09/08     GI:   - No GI distress reported  - Last BM 9/7. Bowel Regimen: none     Weights: 61.7Kg (8/30)  ** no new weights available     Nutritionally Pertinent MEDICATIONS  (STANDING):  pantoprazole    Tablet    Pertinent Labs: 09-08 @ 10:27: Na 133<L>, BUN 12, Cr 0.68, <H>, K+ 4.2, Phos 3.2, Mg 2.2,    Skin per nursing documentation: free of pressure injuries per nursing flow sheets   Edema: none     Estimated Needs:   [x] no change since previous assessment  [] recalculated:     Previous Nutrition Diagnosis: Malnutrition (severe, acute)  Nutrition Diagnosis is: [x] ongoing  - being addressed with PO intake, oral nutritional supplements, food preferences     New Nutrition Diagnosis: [x] Not applicable    Nutrition Care Plan:  [x] In Progress  [] Achieved  [] Not applicable    Nutrition Interventions:     Education Provided:       [X] Yes: Discussed importance of adequate consumption of meals/supplements to optimize protein-energy intake. Encouraged small/frequent meals, nutrient dense snacks, prioritizing protein foods at meal time, using oral nutritional supplement PRN to optimize protein-energy intake.        Recommendations:         [x] Continue current diet order as tolerated.      [x] Continue oral nutrition supplement: Ensure Enlive 1x daily      [x] RD to remain available and follow-up as medically appropriate.     Monitoring and Evaluation:   Continue to monitor nutritional intake, tolerance to diet prescription, weights, labs, skin integrity      RD remains available upon request and will follow up per protocol  STEFANO Sofia [facility] #[phone] Nutrition Follow Up Note  Patient seen for verbal consult: Pt daughter with questions regarding nutrition     Chart reviewed, events noted. This is a "67 y Male patient S/P exploratory laparotomy for pneumoperitoneum 2/2 duodenal perforation with new found evidence of perihepatic enhancing mass (likely abscess), s/p IR drainage (8/30). His Perihepatic drain was removed by IR 9/6. He is stable and pain controlled."  - plan for discharge today per team 09/08    Source:      [x] EMR        [] RN        [x] Family via phone (Pt daughter, Rosibel)    Diet, Regular:   Supplement Feeding Modality:  Oral  Ensure Enlive Cans or Servings Per Day:  1       Frequency:  Daily (09-01-22 @ 16:26) [Active]    - Is current order appropriate/adequate? [x] Yes  []  No:     - PO intake :   [x] >75%  Adequate    [] 50-75%  Fair       [] <50%  Poor    - Nutrition-related concerns:     - Pt daughter reports Pt now with good appetite/PO intake, ~% of meals consumed.      - Drinking Ensure Shake 1x/day (350 sherice, 20g pro)     - K+ Phos and Mg WNL today 09/08     GI:   - No GI distress reported  - Last BM 9/7. Bowel Regimen: none     Weights: 61.7Kg (8/30)  ** no new weights available     Nutritionally Pertinent MEDICATIONS  (STANDING):  pantoprazole    Tablet    Pertinent Labs: 09-08 @ 10:27: Na 133<L>, BUN 12, Cr 0.68, <H>, K+ 4.2, Phos 3.2, Mg 2.2,    Skin per nursing documentation: free of pressure injuries per nursing flow sheets   Edema: none     Estimated Needs:   [x] no change since previous assessment  [] recalculated:     Previous Nutrition Diagnosis: Malnutrition (severe, acute)  Nutrition Diagnosis is: [x] ongoing  - being addressed with PO intake, oral nutritional supplements, food preferences     New Nutrition Diagnosis: [x] Not applicable    Nutrition Care Plan:  [x] In Progress  [] Achieved  [] Not applicable    Nutrition Interventions:     Education Provided:       [X] Yes: Discussed importance of adequate consumption of meals/supplements to optimize protein-energy intake. Encouraged small/frequent meals, nutrient dense snacks, prioritizing protein foods at meal time, using oral nutritional supplement PRN to support adequate PO.        Recommendations:         [x] Continue current diet order as tolerated.      [x] Continue oral nutrition supplement: Ensure Enlive 1x daily      [x] RD to remain available and follow-up as medically appropriate.     Monitoring and Evaluation:   Continue to monitor nutritional intake, tolerance to diet prescription, weights, labs, skin integrity      RD remains available upon request and will follow up per protocol  Cristin Archer RDN CDN [facility] #[phone]

## 2022-09-08 NOTE — DISCHARGE NOTE NURSING/CASE MANAGEMENT/SOCIAL WORK - NSSCTYPOFSERV_GEN_ALL_CORE
Skilled RN visits to reinforce DINAH and wound care teaching, home physical therapy visits and RN will evaluate for home health aide services. RN will call day after discharge to schedule visit.

## 2022-09-08 NOTE — DISCHARGE NOTE PROVIDER - NSDCCPTREATMENT_GEN_ALL_CORE_FT
PRINCIPAL PROCEDURE  Procedure: Exploratory laparotomy  Findings and Treatment:       SECONDARY PROCEDURE  Procedure: Closure of perforated duodenum using omental patch  Findings and Treatment:

## 2022-09-08 NOTE — PROGRESS NOTE ADULT - ASSESSMENT
67 y Male patient S/P exploratory laparotomy for pneumoperitoneum 2/2 duodenal perforation with new found evidence of perihepatic enhancing mass (likely abscess), s/p IR drainage (8/30). His Perihepatic drain was removed by IR 9/6. He is stable and pain controlled.    Plan:  - repeat CT 9/7 w/ interval decreased size of perihepatic collections  - Pain control  - Regular diet  - Protonix BID  - Continue to replete electrolytes  - Continue ertapenem, will dc home w/ cipro  - DVT ppx  - OOB  - preparations H for hemorrhoid pain   - dispo planning    ACS  [id number] 67 y Male patient S/P exploratory laparotomy for pneumoperitoneum 2/2 duodenal perforation with new found evidence of perihepatic enhancing mass (likely abscess), s/p IR drainage (8/30). His Perihepatic drain was removed by IR 9/6. He is stable and pain controlled.    Plan:  - repeat CT 9/7 w/ interval decreased size of perihepatic collections  - Pain control  - Regular diet  - Protonix BID  - Continue to replete electrolytes  - Continue ertapenem, will dc home w/ cipro for 2 weeks   - DVT ppx  - OOB  - preparations H for hemorrhoid pain   - dispo planning- home with VNS and repeat CT prior to completion of abx course     ACS  [id number]

## 2022-09-09 PROBLEM — Z78.9 OTHER SPECIFIED HEALTH STATUS: Chronic | Status: ACTIVE | Noted: 2022-08-19

## 2022-09-12 PROBLEM — Z00.00 ENCOUNTER FOR PREVENTIVE HEALTH EXAMINATION: Status: ACTIVE | Noted: 2022-09-12

## 2022-09-14 ENCOUNTER — APPOINTMENT (OUTPATIENT)
Dept: TRAUMA SURGERY | Facility: CLINIC | Age: 67
End: 2022-09-14

## 2022-09-19 ENCOUNTER — APPOINTMENT (OUTPATIENT)
Dept: TRAUMA SURGERY | Facility: CLINIC | Age: 67
End: 2022-09-19

## 2022-09-19 NOTE — ASSESSMENT
[FreeTextEntry1] : patient is s/p laparotomy for perforated duodenal ulcer w\par patient is doing well, is tolerating diet, is recovering overall nicely\par on exam looks well\par the abdomen is soft non tender and not distended\par the incision is well healed except for two sub-centimeter regions that are very superficial and 100 percent granular.  \par \par Of note is a drain which was placed by interventional radiology - reported 5 cc per day and on my view purulent\par \par advised to patient and family to apply Aquacel daily to wounds until complete healing - has visiting nurse and order were modified\par \par advised to follow up with interventional radiology - arranging appointment now - they would assess drain and remove when indicated\par \par to follow up with us as needed - especially if has abnormal drainage for either wound or drainage sites\par \par advised to follow up with family doctor

## 2022-09-23 ENCOUNTER — OUTPATIENT (OUTPATIENT)
Dept: OUTPATIENT SERVICES | Facility: HOSPITAL | Age: 67
LOS: 1 days | End: 2022-09-23
Payer: COMMERCIAL

## 2022-09-23 DIAGNOSIS — Z11.52 ENCOUNTER FOR SCREENING FOR COVID-19: ICD-10-CM

## 2022-09-23 LAB — SARS-COV-2 RNA SPEC QL NAA+PROBE: DETECTED

## 2022-09-23 PROCEDURE — U0003: CPT

## 2022-09-23 PROCEDURE — U0005: CPT

## 2022-09-23 PROCEDURE — C9803: CPT

## 2022-09-26 ENCOUNTER — RESULT REVIEW (OUTPATIENT)
Age: 67
End: 2022-09-26

## 2022-09-26 ENCOUNTER — OUTPATIENT (OUTPATIENT)
Dept: OUTPATIENT SERVICES | Facility: HOSPITAL | Age: 67
LOS: 1 days | End: 2022-09-26
Payer: COMMERCIAL

## 2022-09-26 ENCOUNTER — TRANSCRIPTION ENCOUNTER (OUTPATIENT)
Age: 67
End: 2022-09-26

## 2022-09-26 ENCOUNTER — APPOINTMENT (OUTPATIENT)
Dept: CT IMAGING | Facility: HOSPITAL | Age: 67
End: 2022-09-26

## 2022-09-26 VITALS
HEART RATE: 74 BPM | OXYGEN SATURATION: 100 % | WEIGHT: 134.92 LBS | SYSTOLIC BLOOD PRESSURE: 117 MMHG | DIASTOLIC BLOOD PRESSURE: 74 MMHG | RESPIRATION RATE: 18 BRPM | TEMPERATURE: 98 F | HEIGHT: 67 IN

## 2022-09-26 DIAGNOSIS — K65.1 PERITONEAL ABSCESS: ICD-10-CM

## 2022-09-26 DIAGNOSIS — Z46.82 ENCOUNTER FOR FITTING AND ADJUSTMENT OF NON-VASCULAR CATHETER: ICD-10-CM

## 2022-09-26 PROCEDURE — 74176 CT ABD & PELVIS W/O CONTRAST: CPT | Mod: 26

## 2022-09-26 PROCEDURE — 76080 X-RAY EXAM OF FISTULA: CPT

## 2022-09-26 PROCEDURE — 76080 X-RAY EXAM OF FISTULA: CPT | Mod: 26

## 2022-09-26 PROCEDURE — 49424 ASSESS CYST CONTRAST INJECT: CPT

## 2022-09-26 PROCEDURE — C1769: CPT

## 2022-09-26 PROCEDURE — 74176 CT ABD & PELVIS W/O CONTRAST: CPT

## 2022-09-26 NOTE — ASU DISCHARGE PLAN (ADULT/PEDIATRIC) - ASU DC SPECIAL INSTRUCTIONSFT
Drain Check and removal    Discharge Instructions  - You have had a drain checked and removed.  - Keep the area clean and dry.  - Do not soak in a tub or pool with the drain, however you may shower with the drain and dressing covered in plastic wrap.  - Do not put traction on the drain and be careful that the drain does not get accidentally dislodged or kinked.  - Record output daily from the drain. Empty the bag as needed.  - You may resume your normal diet.  - You may resume your normal medications.  - It is normal to experience some pain over the site for the next few days. You may take apply ice to the area (20 minutes on, 20 minutes off) and take Tylenol for that pain. Do not take more frequently than every 6 hours and do not exceed more than 3000mg of Tylenol in a 24 hour period.    Notify your primary physician and/or Interventional Radiology IMMEDIATELY if you experience any of the following       - Fever of 100.4F  or 38C       - Chills or Rigors/ Shakes       - Swelling and/or Redness in the area of the puncture site       - Worsening Pain       - Blood soaked bandages or worsening bleeding       - Lightheadedness and/or dizziness upon standing       - Chest Pain/ Tightness       - Shortness of Breath       - Difficulty walking    If you have a problem that you believe requires IMMEDIATE attention, please go to your NEAREST Emergency Room. If you believe your problem can safely wait until you speak to a physician, please call Interventional Radiology for any concerns.    During Normal Weekday Business Hours- You can contact the Interventional Radiology department during normal business hours via telephone.  During Evenings and Weekends- If you need to contact Interventional Radiology during off hours, do so by calling the hospital and requesting to be connected to the Interventional Radiologist on call.

## 2022-09-26 NOTE — PROCEDURE NOTE - PROCEDURE FINDINGS AND DETAILS
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 CT imaging reviewed and case discussed with Dr. Gongora. Minimal drain outputs.  Drain was successfully removed over wire. Patient tolerated well. Sterile dressing applied.

## 2022-09-26 NOTE — PRE PROCEDURE NOTE - PRE PROCEDURE EVALUATION
Interventional Radiology    HPI:  67 y Male patient S/P exploratory laparotomy for pneumoperitoneum 2/2 duodenal perforation with new found evidence of perihepatic enhancing mass (likely abscess), s/p IR drainage (8/30). His Perihepatic drain was removed by IR 9/6. Surgical drain was exchanged and repositioned with IR drain on 9/8/22.    Patient denies any fevers, chills, abdominal pain. Reports 1 teaspoon of drain output every 2 days.    NPO:    Allergies:   Medications (Abx/Cardiac/Anticoagulation/Blood Products)      Data:  170.2  61.2  T(C): 36.6  HR: 74  BP: 117/74  RR: 18  SpO2: 100%    Abscess of peritoneum    No pertinent past medical history    No significant past surgical history    [email or web address]        Exam  General: No acute distress  Chest: Non labored breathing          Imaging:     Plan: 67y Male presents for abdominal abscess drain evaluation.  -Risks/Benefits/alternatives explained with the patient and/or healthcare proxy and witnessed informed consent obtained.

## 2022-09-26 NOTE — ASU PATIENT PROFILE, ADULT - FALL HARM RISK - UNIVERSAL INTERVENTIONS
Bed in lowest position, wheels locked, appropriate side rails in place/Call bell, personal items and telephone in reach/Instruct patient to call for assistance before getting out of bed or chair/Non-slip footwear when patient is out of bed/[place] to call system/Physically safe environment - no spills, clutter or unnecessary equipment/Purposeful Proactive Rounding/Room/bathroom lighting operational, light cord in reach

## 2022-09-26 NOTE — ASU DISCHARGE PLAN (ADULT/PEDIATRIC) - NS MD DC FALL RISK RISK
For information on Fall & Injury Prevention, visit: [email or web address] OR  [email or web address] OR  https://www.cdc.gov/steadi/patient.html

## 2022-09-26 NOTE — ASU DISCHARGE PLAN (ADULT/PEDIATRIC) - CARE PROVIDER_API CALL
Sade Lamb (MD)  Surgery; Surgical Critical Care  [address] [address]  Phone: [phone]  Fax: [phone]  Follow Up Time:

## 2022-10-09 ENCOUNTER — TRANSCRIPTION ENCOUNTER (OUTPATIENT)
Age: 67
End: 2022-10-09

## 2022-10-31 ENCOUNTER — APPOINTMENT (OUTPATIENT)
Dept: TRAUMA SURGERY | Facility: CLINIC | Age: 67
End: 2022-10-31

## 2022-10-31 RX ORDER — PANTOPRAZOLE 40 MG/1
40 TABLET, DELAYED RELEASE ORAL TWICE DAILY
Qty: 60 | Refills: 2 | Status: ACTIVE | COMMUNITY
Start: 2022-10-31 | End: 1900-01-01

## 2022-10-31 NOTE — HISTORY OF PRESENT ILLNESS
[de-identified] : Mr. Barillas returns for follow up after repair of a duodenal perforation and abscess drainage. He is feeling well, is eating well, and has no fevers or chills. \par \par Midline lap wound with two areas of hypergranulation tissue.\par I discussed with patient, his spouse and his son that I recommend application of silver nitrate to these areas, that this will cause the tissue to turn grey/black and will cause some grey.black drainage, then likely scabbing. I explained that one of the areas has more hypergranulation than the other, and that this area could require multiple treatments. I recommended that they keep the areas covered with bandaids, that they can remove the bandaids for showers and let the area get wet, then pat dry and replace bandaids. I suggested that if the bandaids tend to stick, they can use some neosporin or bacitracin to prevent sticking. Patient in agreement (pt preferred that his son provide translation), two areas treated with silver nitrate, bandaids applied. Questions answered.\par \par We discussed that he should continue to take Protonix twice a day until he sees his GI, who may perform repeat endoscopy at some point to check for healing. I re-emphasized that he should never take NSAIDs (I mentioned advil, motrin, ibuprofen, and aspirin) and that if he wants to take any new medication he should tell the pharmacist or his physician that he has a history of ulcers so they can help him avoid other OTC and prescription meds containing NSAIDs or steroids. Questions answered. \par \par If the areas of hypergranulation scab over and then subsequently heal well, no further office visits needed. If hypergranulation persists or wounds remain open, return to office in two weeks.

## 2022-11-16 ENCOUNTER — APPOINTMENT (OUTPATIENT)
Dept: TRAUMA SURGERY | Facility: CLINIC | Age: 67
End: 2022-11-16
Payer: MEDICARE

## 2022-11-16 PROCEDURE — 99024 POSTOP FOLLOW-UP VISIT: CPT

## 2022-11-16 NOTE — ASSESSMENT
[FreeTextEntry1] : patient is s/p duodenal ulcer repair\par overall doing well\par here for non healing wound\par near umbilicus there is a small area of hypergranulation tissue which was addressed with silver nitrate stick\par the wound tunnels through a very small opening superiorly - no drainage\par it was packed with 1/4 inch packing\par \par I advised patient, patients wife and patients daughter to leave packing in 48 hours then remove\par then to use bacitracin followed by dry gauze\par \par I anticipate complete wound healing at this stage although advised that patient may need additional visit to facilitate complete wound healing\par \par

## 2022-11-18 ENCOUNTER — TRANSCRIPTION ENCOUNTER (OUTPATIENT)
Age: 67
End: 2022-11-18

## 2022-11-19 ENCOUNTER — APPOINTMENT (OUTPATIENT)
Dept: CT IMAGING | Facility: CLINIC | Age: 67
End: 2022-11-19

## 2022-11-19 ENCOUNTER — OUTPATIENT (OUTPATIENT)
Dept: OUTPATIENT SERVICES | Facility: HOSPITAL | Age: 67
LOS: 1 days | End: 2022-11-19
Payer: COMMERCIAL

## 2022-11-19 DIAGNOSIS — Z00.8 ENCOUNTER FOR OTHER GENERAL EXAMINATION: ICD-10-CM

## 2022-11-19 PROCEDURE — 74177 CT ABD & PELVIS W/CONTRAST: CPT | Mod: 26

## 2022-11-19 PROCEDURE — 74177 CT ABD & PELVIS W/CONTRAST: CPT

## 2022-11-21 ENCOUNTER — APPOINTMENT (OUTPATIENT)
Dept: TRAUMA SURGERY | Facility: CLINIC | Age: 67
End: 2022-11-21

## 2022-11-21 PROCEDURE — 99212 OFFICE O/P EST SF 10 MIN: CPT

## 2022-11-21 NOTE — HISTORY OF PRESENT ILLNESS
[de-identified] : 67 year old man with perforated duodenal ulcer s/p ex-lap, omental patch repair on 10/9/22 with Dr. Lamb. Course was notable for SSI and intraabdominal fluid collections requiring IR drainage which is now resolved. He is accompanied by his wife and daughter.\par He has been following for ongoing wound care of the midportion of his incision which has not closed. He was seen by Dr. Benavides last week who cauterized area of hypergranulation with silver nitrate and packed the wound. Afterward the patient experienced severe pain with drainage so he called the office and Dr. Lamb ordered a CT A/P with IV/PO contrast which was done on 11/19/22. \par \par I reviewed the CT, there are no further intrabdominal collections or any subcutaneous collections. No enteric leakage of contrast and no evidence of enteroctuaneous fistula. A right pulmonary opacity that was previously visualized is improved. \par \par Exam:\par Non-toxic\par Abd small 1cm area of hypergranulation tissue in the mid portion with a thin skin bridge. Wound tracks posteriorly and superiorly, fascia intact. No purulence or erythema. \par \par A/P: Midline wound with area of hypergranulation tissue that has not yet closed, no evidence of infection. CT reassuring. Discussed lung opacity finding seen on CT, no need for follow up as it is improving and likely was a inflammatory process he developed while in the hospital. \par - granulation tissue cauterized with silver nitrate\par - wound packed with packing strip, instructed patient to perform daily, will send VNS referral\par - return to clinic in 1 week for wound check

## 2022-11-28 ENCOUNTER — APPOINTMENT (OUTPATIENT)
Dept: TRAUMA SURGERY | Facility: CLINIC | Age: 67
End: 2022-11-28

## 2022-11-28 PROCEDURE — 99212 OFFICE O/P EST SF 10 MIN: CPT

## 2022-11-28 NOTE — PLAN
[FreeTextEntry1] : I asked them to return next week if the hypergranulation tissue recurs. Otherwise they can wait longer.

## 2022-11-28 NOTE — HISTORY OF PRESENT ILLNESS
[de-identified] : Underwent percutaneous drainage of perihepatic abscess on 8/30 and repositioning of the drain on 9/8\par Discharged home on 9/8/2022.\par The drain was removed on 9/26.\par He was last seen in the office on 11/21 for ongoing wound care, and VNS was ordered.\par  [de-identified] : tolerating regular diet without nausea, vomiting or abdominal pain.\par no fevers or chills.\par VNS visits every other day and places an Aquacel dressing on the open area of the wound.

## 2022-11-28 NOTE — PHYSICAL EXAM
[Calm] : calm [de-identified] : appears well [de-identified] : soft / NT / ND\par I treated hypergranulation tissue at the 2 cm open area of the midline laparotomy incision with silver nitrate.

## 2022-12-12 ENCOUNTER — APPOINTMENT (OUTPATIENT)
Dept: TRAUMA SURGERY | Facility: CLINIC | Age: 67
End: 2022-12-12

## 2022-12-12 DIAGNOSIS — K26.5 CHRONIC OR UNSPECIFIED DUODENAL ULCER WITH PERFORATION: ICD-10-CM

## 2022-12-12 PROCEDURE — 99212 OFFICE O/P EST SF 10 MIN: CPT

## 2022-12-12 NOTE — HISTORY OF PRESENT ILLNESS
[de-identified] : 67 year old man with perforated duodenal ulcer s/p ex-lap, omental patch repair on 10/9/22 with Dr. Lamb. Course was notable for SSI and intraabdominal fluid collections requiring IR drainage which is now resolved.\par Followed for nonhealing wound at midportion of laparotomy incision. Returns almost weekly for cauterization of granulation tissue. \par Being seen by VNS every other day. Denies pain. Tolerating PO. \par \par Exam:\par Non-toxic, pleasant\par Midline incision with subcentimeter area of hypergranulation tissue, healthy and well perfused\par Silver nitrate applied and wound packed with packing strip\par \par A/P: Midline wound with hypergranulation tissue, improving with time and wound care\par - f/u in 2 weeks for wound check\par - continue current wound care with VNS\par

## 2024-04-03 NOTE — PATIENT PROFILE ADULT - ARE SIGNIFICANT INDICATORS COMPLETE.
Patient states he only has 2 doses of medication left.  Patient has psych appointment scheduled with Jacob Macias with [address] [place] [address] on 1/22/2020 Yes Goal: Patient will ambulate 25 feet with RW and CGA in 2 weeks

## 2024-06-21 NOTE — PROCEDURE NOTE - PROCEDURE FINDINGS AND DETAILS
[facility]      Patient Education    [employer] HANDOUT- PATIENT  7 YEAR VISIT  Here are some suggestions from [employer] experts that may be of value to your family.     TAKING CARE OF YOU  Enjoy spending time with your family.  Help out at home and in your community.  If you get angry with someone, try to walk away.  Say  No!  to drugs, alcohol, and cigarettes or e-cigarettes. Walk away if someone offers you some.  Talk with your parents, teachers, or another trusted adult if anyone bullies, threatens, or hurts you.  Go online only when your parents say it s OK. Don t give your name, address, or phone number on a Web site unless your parents say it s OK.  If you want to chat online, tell your parents first.  If you feel scared online, get off and tell your parents.    EATING WELL AND BEING ACTIVE  Brush your teeth at least twice each day, morning and night.  Floss your teeth every day.  Wear your mouth guard when playing sports.  Eat breakfast every day. It helps you learn.  Be a healthy eater. It helps you do well in school and sports.  Have vegetables, fruits, lean protein, and whole grains at meals and snacks.  Eat when you re hungry. Stop when you feel satisfied.  Eat with your family often.  Drink 3 cups of low-fat or fat-free milk or water instead of soda or juice drinks.  Limit high-fat foods and drinks such as candies, snacks, fast food, and soft drinks.  Talk with us if you re thinking about losing weight or using dietary supplements.  Plan and get at least 1 hour of active exercise every day.    GROWING AND DEVELOPING  Ask a parent or trusted adult questions about the changes in your body.  Share your feelings with others. Talking is a good way to handle anger, disappointment, worry, and sadness.  To handle your anger, try  Staying calm  Listening and talking through it  Trying to understand the other person s point of view  Know that it s OK to feel up sometimes and down others, 
Perihepatic drain evaluation demonstrating collapsed cavity without evidence of fistula or communication to adjacent structure; drain removed over wire    - surgical drain evaluated and remains in place
but if you feel sad most of the time, let us know.  Don t stay friends with kids who ask you to do scary or harmful things.  Know that it s never OK for an older child or an adult to  Show you his or her private parts.  Ask to see or touch your private parts.  Scare you or ask you not to tell your parents.  If that person does any of these things, get away as soon as you can and tell your parent or another adult you trust.    DOING WELL AT SCHOOL  Try your best at school. Doing well in school helps you feel good about yourself.  Ask for help when you need it.  Join clubs and teams, [place] groups, and friends for activities after school.  Tell kids who pick on you or try to hurt you to stop. Then walk away.  Tell adults you trust about bullies.    PLAYING IT SAFE  Wear your lap and shoulder seat belt at all times in the car. Use a booster seat if the lap and shoulder seat belt does not fit you yet.  Sit in the back seat until you are 13 years old. It is the safest place.  Wear your helmet and safety gear when riding scooters, biking, skating, in-line skating, skiing, snowboarding, and horseback riding.  Always wear the right safety equipment for your activities.  Never swim alone. Ask about learning how to swim if you don t already know how.  Always wear sunscreen and a hat when you re outside. Try not to be outside for too long between 11:00 am and 3:00 pm, when it s easy to get a sunburn.  Have friends over only when your parents say it s OK.  Ask to go home if you are uncomfortable at someone else s house or a party.  If you see a gun, don t touch it. Tell your parents right away.        Consistent with Bright Futures: Guidelines for Health Supervision of Infants, Children, and Adolescents, 4th Edition  For more information, go to https://brightfutures.aap.org.             Patient Education    BRIGHT FUTURES HANDOUT- PARENT  7 YEAR VISIT  Here are some suggestions from Bright Futures experts that may be of value to 
drain repositioned into the superior collection. Draining cloudy fluid.
your family.     HOW YOUR FAMILY IS DOING  Encourage your child to be independent and responsible. Hug and praise him.  Spend time with your child. Get to know his friends and their families.  Take pride in your child for good behavior and doing well in school.  Help your child deal with conflict.  If you are worried about your living or food situation, talk with us. Community agencies and programs such as [employer] can also provide information and assistance.  Don t smoke or use e-cigarettes. Keep your home and car smoke-free. Tobacco-free spaces keep children healthy.  Don t use alcohol or drugs. If you re worried about a family member s use, let us know, or reach out to local or online resources that can help.  Put the family computer in a central place.  Watch your child s computer use.  Know who he talks with online.  Install a safety filter.    STAYING HEALTHY  Take your child to the dentist twice a year.  Give your child a fluoride supplement if the dentist recommends it.  Remind your child to brush his teeth twice a day  After breakfast  Before bed  Use a pea-sized amount of toothpaste with fluoride.  Remind your child to floss his teeth once a day.  Encourage your child to always wear a mouth guard to protect his teeth while playing sports.  Encourage healthy eating by  Eating together often as a family  Serving vegetables, fruits, whole grains, lean protein, and low-fat or fat-free dairy  Limiting sugars, salt, and low-nutrient foods  Limit screen time to 2 hours (not counting schoolwork).  Don t put a TV or computer in your child s bedroom.  Consider making a family media use plan. It helps you make rules for media use and balance screen time with other activities, including exercise.  Encourage your child to play actively for at least 1 hour daily.    YOUR GROWING CHILD  Be a model for your child by saying you are sorry when you make a mistake.  Show your child how to use her words when she is angry.  Teach 
your child to help others.  Give your child chores to do and expect them to be done.  Give your child her own personal space.  Get to know your child s friends and their families.  Understand that your child s friends are very important.  Answer questions about puberty. Ask us for help if you don t feel comfortable answering questions.  Teach your child the importance of delaying sexual behavior. Encourage your child to ask questions.  Teach your child how to be safe with other adults.  No adult should ask a child to keep secrets from parents.  No adult should ask to see a child s private parts.  No adult should ask a child for help with the adult s own private parts.    SCHOOL  Show interest in your child s school activities.  If you have any concerns, ask your child s teacher for help.  Praise your child for doing things well at school.  Set a routine and make a quiet place for doing homework.  Talk with your child and her teacher about bullying.    SAFETY  The back seat is the safest place to ride in a car until your child is 13 years old.  Your child should use a belt-positioning booster seat until the vehicle s lap and shoulder belts fit.  Provide a properly fitting helmet and safety gear for riding scooters, biking, skating, in-line skating, skiing, snowboarding, and horseback riding.  Teach your child to swim and watch him in the water.  Use a hat, sun protection clothing, and sunscreen with SPF of 15 or higher on his exposed skin. Limit time outside when the sun is strongest (11:00 am-3:00 pm).  If it is necessary to keep a gun in your home, store it unloaded and locked with the ammunition locked separately from the gun.        Helpful Resources:  Family Media Use Plan: www.healthychildren.org/MediaUsePlan  Smoking Quit Line: 605.914.7073 Information About Car Safety Seats: www.safercar.gov/parents  Toll-free Auto Safety Hotline: 746.168.4631  Consistent with Bright Futures: Guidelines for Health 
Supervision of Infants, Children, and Adolescents, 4th Edition  For more information, go to https://brightfutures.aap.org.             
US w/ upper abdominal fluid collection. Successful 10.2Fr drain placement.

## (undated) DEVICE — WARMING BLANKET UPPER ADULT

## (undated) DEVICE — STAPLER SKIN VISI-STAT 35 WIDE

## (undated) DEVICE — SUT PDS II 1 48" TP-1

## (undated) DEVICE — MEDICATION LABELS W MARKER

## (undated) DEVICE — DRAPE MAYO STAND 30"

## (undated) DEVICE — GLV 7 PROTEXIS (WHITE)

## (undated) DEVICE — BLADE SCALPEL SAFETYLOCK #10

## (undated) DEVICE — VENODYNE/SCD SLEEVE CALF LARGE

## (undated) DEVICE — DRSG OPSITE 13.75 X 4"

## (undated) DEVICE — SUT SOFSILK 3-0 18" V-20 (POP-OFF)

## (undated) DEVICE — VISITEC 4X4

## (undated) DEVICE — PACK MAJOR ABDOMINAL SUPINE

## (undated) DEVICE — DRAPE INSTRUMENT POUCH 6.75" X 11"

## (undated) DEVICE — PACK BASIN SPECIAL PROCEDURE

## (undated) DEVICE — Device

## (undated) DEVICE — DRAIN CHANNEL 19FR ROUND FULL FLUTED

## (undated) DEVICE — DRAPE TOWEL BLUE 17" X 24"

## (undated) DEVICE — BLADE SCALPEL SAFETYLOCK #15

## (undated) DEVICE — GLV 7.5 PROTEXIS (WHITE)

## (undated) DEVICE — GLV 8 PROTEXIS (WHITE)

## (undated) DEVICE — SPECIMEN CONTAINER 100ML

## (undated) DEVICE — FOLEY TRAY 16FR 5CC LTX UMETER CLOSED

## (undated) DEVICE — WARMING BLANKET LOWER ADULT

## (undated) DEVICE — DRAIN RESERVOIR FOR JACKSON PRATT 100CC CARDINAL

## (undated) DEVICE — GOWN TRIMAX LG

## (undated) DEVICE — SUT SOFSILK 2-0 18" V-20 (POP-OFF)

## (undated) DEVICE — PREP CHLORAPREP HI-LITE ORANGE 26ML

## (undated) DEVICE — SOL IRR POUR NS 0.9% 500ML

## (undated) DEVICE — STAPLER COVIDIEN ENDO GIA STANDARD HANDLE

## (undated) DEVICE — LIGASURE ATLAS 10MM 20CM

## (undated) DEVICE — MARKING PEN W RULER

## (undated) DEVICE — GLV 8.5 PROTEXIS (WHITE)

## (undated) DEVICE — GLV 6.5 PROTEXIS (WHITE)

## (undated) DEVICE — LIGASURE IMPACT

## (undated) DEVICE — LAP PAD 18 X 18"

## (undated) DEVICE — POSITIONER FOAM EGG CRATE ULNAR 2PCS (PINK)

## (undated) DEVICE — SOL IRR POUR H2O 250ML